# Patient Record
Sex: MALE | Race: WHITE | Employment: FULL TIME | URBAN - METROPOLITAN AREA
[De-identification: names, ages, dates, MRNs, and addresses within clinical notes are randomized per-mention and may not be internally consistent; named-entity substitution may affect disease eponyms.]

---

## 2017-03-20 ENCOUNTER — OFFICE VISIT (OUTPATIENT)
Dept: PULMONOLOGY | Age: 57
End: 2017-03-20

## 2017-03-20 VITALS
BODY MASS INDEX: 36.53 KG/M2 | WEIGHT: 241 LBS | HEART RATE: 67 BPM | HEIGHT: 68 IN | SYSTOLIC BLOOD PRESSURE: 128 MMHG | RESPIRATION RATE: 16 BRPM | DIASTOLIC BLOOD PRESSURE: 88 MMHG | OXYGEN SATURATION: 97 % | TEMPERATURE: 97.8 F

## 2017-03-20 DIAGNOSIS — Z71.89 CPAP USE COUNSELING: ICD-10-CM

## 2017-03-20 DIAGNOSIS — E66.9 OBESITY (BMI 30-39.9): ICD-10-CM

## 2017-03-20 DIAGNOSIS — Z99.89 OSA ON CPAP: Primary | ICD-10-CM

## 2017-03-20 DIAGNOSIS — G47.33 OSA ON CPAP: Primary | ICD-10-CM

## 2017-03-20 PROCEDURE — 99213 OFFICE O/P EST LOW 20 MIN: CPT | Performed by: NURSE PRACTITIONER

## 2017-03-20 ASSESSMENT — SLEEP AND FATIGUE QUESTIONNAIRES
HOW LIKELY ARE YOU TO NOD OFF OR FALL ASLEEP WHEN YOU ARE A PASSENGER IN A CAR FOR AN HOUR WITHOUT A BREAK: 0
HOW LIKELY ARE YOU TO NOD OFF OR FALL ASLEEP WHILE SITTING INACTIVE IN A PUBLIC PLACE: 0
HOW LIKELY ARE YOU TO NOD OFF OR FALL ASLEEP WHILE SITTING QUIETLY AFTER LUNCH WITHOUT ALCOHOL: 2
NECK CIRCUMFERENCE (INCHES): 17.25
HOW LIKELY ARE YOU TO NOD OFF OR FALL ASLEEP WHILE SITTING AND READING: 2
HOW LIKELY ARE YOU TO NOD OFF OR FALL ASLEEP WHILE SITTING AND TALKING TO SOMEONE: 0
ESS TOTAL SCORE: 8
HOW LIKELY ARE YOU TO NOD OFF OR FALL ASLEEP IN A CAR, WHILE STOPPED FOR A FEW MINUTES IN TRAFFIC: 0
HOW LIKELY ARE YOU TO NOD OFF OR FALL ASLEEP WHILE LYING DOWN TO REST IN THE AFTERNOON WHEN CIRCUMSTANCES PERMIT: 3
HOW LIKELY ARE YOU TO NOD OFF OR FALL ASLEEP WHILE WATCHING TV: 1

## 2017-04-10 DIAGNOSIS — B02.9 HERPES ZOSTER WITHOUT COMPLICATION: ICD-10-CM

## 2017-04-10 RX ORDER — GABAPENTIN 300 MG/1
300 CAPSULE ORAL EVERY EVENING
Qty: 30 CAPSULE | Refills: 3 | Status: SHIPPED | OUTPATIENT
Start: 2017-04-10 | End: 2017-09-10 | Stop reason: ALTCHOICE

## 2017-05-02 ENCOUNTER — OFFICE VISIT (OUTPATIENT)
Dept: CARDIOLOGY CLINIC | Age: 57
End: 2017-05-02

## 2017-05-02 VITALS
BODY MASS INDEX: 35.77 KG/M2 | WEIGHT: 236 LBS | HEART RATE: 52 BPM | OXYGEN SATURATION: 97 % | SYSTOLIC BLOOD PRESSURE: 124 MMHG | DIASTOLIC BLOOD PRESSURE: 76 MMHG | HEIGHT: 68 IN

## 2017-05-02 DIAGNOSIS — I25.10 CORONARY ARTERY DISEASE INVOLVING NATIVE CORONARY ARTERY OF NATIVE HEART WITHOUT ANGINA PECTORIS: Primary | ICD-10-CM

## 2017-05-02 DIAGNOSIS — E66.9 OBESITY (BMI 30-39.9): ICD-10-CM

## 2017-05-02 PROCEDURE — 99214 OFFICE O/P EST MOD 30 MIN: CPT | Performed by: INTERNAL MEDICINE

## 2017-05-08 RX ORDER — RIVAROXABAN 20 MG/1
TABLET, FILM COATED ORAL
Qty: 30 TABLET | Refills: 11 | Status: SHIPPED | OUTPATIENT
Start: 2017-05-08 | End: 2018-04-18 | Stop reason: SDUPTHER

## 2017-06-20 RX ORDER — AMLODIPINE BESYLATE 5 MG/1
TABLET ORAL
Qty: 90 TABLET | Refills: 3 | Status: SHIPPED | OUTPATIENT
Start: 2017-06-20 | End: 2018-04-18 | Stop reason: SDUPTHER

## 2017-06-20 RX ORDER — ATORVASTATIN CALCIUM 10 MG/1
TABLET, FILM COATED ORAL
Qty: 90 TABLET | Refills: 3 | Status: SHIPPED | OUTPATIENT
Start: 2017-06-20 | End: 2017-09-05

## 2017-09-05 RX ORDER — ATORVASTATIN CALCIUM 10 MG/1
TABLET, FILM COATED ORAL
Qty: 30 TABLET | Refills: 5 | Status: SHIPPED | OUTPATIENT
Start: 2017-09-05 | End: 2018-04-18 | Stop reason: SDUPTHER

## 2017-09-05 RX ORDER — ASPIRIN 81 MG/1
TABLET, CHEWABLE ORAL
Qty: 90 TABLET | Refills: 3 | Status: SHIPPED | OUTPATIENT
Start: 2017-09-05 | End: 2018-09-06 | Stop reason: SDUPTHER

## 2017-09-06 RX ORDER — CLOPIDOGREL BISULFATE 75 MG/1
TABLET ORAL
Qty: 30 TABLET | Refills: 11 | Status: SHIPPED | OUTPATIENT
Start: 2017-09-06 | End: 2018-04-18 | Stop reason: SDUPTHER

## 2017-09-19 ENCOUNTER — OFFICE VISIT (OUTPATIENT)
Dept: FAMILY MEDICINE CLINIC | Age: 57
End: 2017-09-19

## 2017-09-19 VITALS
WEIGHT: 233 LBS | DIASTOLIC BLOOD PRESSURE: 74 MMHG | HEIGHT: 68 IN | HEART RATE: 68 BPM | SYSTOLIC BLOOD PRESSURE: 126 MMHG | OXYGEN SATURATION: 98 % | BODY MASS INDEX: 35.31 KG/M2

## 2017-09-19 DIAGNOSIS — N20.0 RENAL CALCULUS: ICD-10-CM

## 2017-09-19 DIAGNOSIS — M25.552 LEFT HIP PAIN: Primary | ICD-10-CM

## 2017-09-19 DIAGNOSIS — K57.32 DIVERTICULITIS OF LARGE INTESTINE WITHOUT PERFORATION OR ABSCESS WITHOUT BLEEDING: ICD-10-CM

## 2017-09-19 PROCEDURE — 90471 IMMUNIZATION ADMIN: CPT | Performed by: FAMILY MEDICINE

## 2017-09-19 PROCEDURE — 90630 INFLUENZA, QUADV, 18-64 YRS, ID, PF, MICRO INJ, 0.1ML (FLUZONE QUADV, PF): CPT | Performed by: FAMILY MEDICINE

## 2017-09-19 PROCEDURE — 99214 OFFICE O/P EST MOD 30 MIN: CPT | Performed by: FAMILY MEDICINE

## 2017-10-05 ENCOUNTER — HOSPITAL ENCOUNTER (OUTPATIENT)
Dept: OTHER | Age: 57
Discharge: HOME | End: 2017-10-05
Attending: UROLOGY | Admitting: UROLOGY

## 2017-10-05 ENCOUNTER — HOSPITAL ENCOUNTER (OUTPATIENT)
Dept: GENERAL RADIOLOGY | Age: 57
Discharge: HOME | End: 2017-10-05

## 2017-10-05 DIAGNOSIS — N20.1 CALCULUS OF URETER: ICD-10-CM

## 2017-10-09 ENCOUNTER — OFFICE VISIT (OUTPATIENT)
Dept: ORTHOPEDIC SURGERY | Age: 57
End: 2017-10-09

## 2017-10-09 VITALS — BODY MASS INDEX: 34.92 KG/M2 | WEIGHT: 230.4 LBS | HEIGHT: 68 IN

## 2017-10-09 DIAGNOSIS — M16.12 PRIMARY OSTEOARTHRITIS OF LEFT HIP: ICD-10-CM

## 2017-10-09 DIAGNOSIS — M25.552 LEFT HIP PAIN: Primary | ICD-10-CM

## 2017-10-09 PROCEDURE — 99203 OFFICE O/P NEW LOW 30 MIN: CPT | Performed by: ORTHOPAEDIC SURGERY

## 2017-10-09 PROCEDURE — 73502 X-RAY EXAM HIP UNI 2-3 VIEWS: CPT | Performed by: ORTHOPAEDIC SURGERY

## 2017-10-09 NOTE — LETTER
[address] [place] [address] Phone: [phone]     Fax: [phone]      Surgeon:  Shira Porter M.D.    Office: [phone]) or Direct [phone]    Surgery Date: 2017  Time: 11:00 am   Time Needed: 15 minutes    Procedure: Arthrogram and Cortisone Injection Left Hip    Diagnosis: Osteoarthritis Left Hip       Patient Name: Haroon Cope    : 1960  SEX: Male Social Security Number:     Address: [address] [place] [address] (home) [phone] (work)    PCP: Carlita Medley MD    H&P: PCP    Anesthesia:    General       Local      MAC      SCOTT      SPINAL       Primary Insurance:  Payor: [employer] / [facility]: [facility] / Product Type: *No Product type* /         Allergies: No Known Allergies     Special equipment:     Tanya Root 10/9/2017     AKB:[phone]

## 2017-10-09 NOTE — PROGRESS NOTES
CHIEF COMPLAINT:    Chief Complaint   Patient presents with    Hip Pain     LEFT HIP- 2 YEARS, GROIN PAIN, SOME POSTERIOR HIP PAIN. HISTORY OF PRESENT ILLNESS:                The patient is a 56 y.o. male who presents to clinic for evaluation of LEFT hip pain. He states this pain began about 2 years ago without injury. He points to the groin as the primary location of his pain. He states he is very limited with activity secondary to this hip pain. He denies any radiating pain or numbness and tingling distally.     Past Medical History:   Diagnosis Date    [facility]     Coronary artery disease 4/23/2014    Dr. Navi Wilson hematology and Dr. Reinaldo Harris Cardiology    Hyperlipidemia     DEE on CPAP           The pain assessment was noted & is as follows:  Pain Assessment  Location of Pain: Pelvis (HIP)  Location Modifiers: Left  Severity of Pain: 3 (8/10 AT WORST)  Quality of Pain: Aching, Dull, Sharp  Duration of Pain: Persistent  Frequency of Pain: Intermittent  Aggravating Factors: Walking, Standing, Stairs, Exercise, Bending  Limiting Behavior: Yes  Relieving Factors: Rest]      Work Status/Functionality:     Past Medical History: Medical history form was reviewed today & can be found in the media tab  Past Medical History:   Diagnosis Date    [facility] ([facility]     Coronary artery disease 4/23/2014    Dr. Navi Wilson hematology and Dr. Reinaldo Harris Cardiology    Hyperlipidemia     DEE on CPAP       Past Surgical History:     Past Surgical History:   Procedure Laterality Date    CARDIAC CATHETERIZATION  2014    COLONOSCOPY      COLONOSCOPY  5/2/16    colon polyp, biopsy ascending lesion    DENTAL SURGERY  2015    FRACTURE SURGERY Right 2004    tib-fib    SKIN BIOPSY       Current Medications:     Current Outpatient Prescriptions:     ondansetron (ZOFRAN) 4 MG tablet, Take 1 tablet by mouth every 8 hours as needed for Nausea or Vomiting, Disp: 10 tablet, Rfl: 0    clopidogrel it was checked for errors. It is possible that there are still dictated errors within this office note. If so, please bring any errors to my attention for an addendum. All efforts were made to ensure that this office note is accurate.           Jeffrey Turk MD

## 2017-10-17 ENCOUNTER — TELEPHONE (OUTPATIENT)
Dept: ORTHOPEDIC SURGERY | Age: 57
End: 2017-10-17

## 2017-10-26 ENCOUNTER — OFFICE VISIT (OUTPATIENT)
Dept: FAMILY MEDICINE CLINIC | Age: 57
End: 2017-10-26

## 2017-10-26 VITALS
WEIGHT: 230 LBS | DIASTOLIC BLOOD PRESSURE: 78 MMHG | SYSTOLIC BLOOD PRESSURE: 112 MMHG | HEART RATE: 56 BPM | BODY MASS INDEX: 34.98 KG/M2 | TEMPERATURE: 98.1 F

## 2017-10-26 DIAGNOSIS — Z01.818 PREOPERATIVE EXAMINATION: Primary | ICD-10-CM

## 2017-10-26 PROCEDURE — 99243 OFF/OP CNSLTJ NEW/EST LOW 30: CPT | Performed by: FAMILY MEDICINE

## 2017-10-26 NOTE — PROGRESS NOTES
[facility]  [phone]  Fax: [phone]   Pre-operative History and Physical      DIAGNOSIS:  Left hip arthritis    PROCEDURE:  Left hip corticosteroid injection under fluoroscopy      History Obtained From:  patient    HISTORY OF PRESENT ILLNESS:    The patient is a 56 y.o. male with significant past medical history of hip pain who presents preop exam       Past Medical History:   Diagnosis Date    Blood clotting disorder ([address]     Coronary artery disease 4/23/2014    Dr. Alex Diaz hematology and Dr. Veto Keane Cardiology    Hyperlipidemia     DEE on CPAP     Primary osteoarthritis of left hip 10/9/2017     Past Surgical History:   Procedure Laterality Date    CARDIAC CATHETERIZATION  2014    COLONOSCOPY      COLONOSCOPY  5/2/16    colon polyp, biopsy ascending lesion    DENTAL SURGERY  2015    FRACTURE SURGERY Right 2004    tib-fib    SKIN BIOPSY       Current Outpatient Prescriptions   Medication Sig Dispense Refill    clopidogrel (PLAVIX) 75 MG tablet TAKE 1 TABLET BY MOUTH EVERY DAY 30 tablet 11    aspirin 81 MG chewable tablet CHEW 1 TABLET DAILY 90 tablet 3    atorvastatin (LIPITOR) 10 MG tablet TAKE 1 TABLET BY MOUTH EVERY NIGHT AT BEDTIME 30 tablet 5    VIRT-JAVI FORTE 2.5-25-2 MG TABS TAKE 1 TABLET BY MOUTH DAILY 30 tablet 0    amLODIPine (NORVASC) 5 MG tablet TAKE 1 TABLET DAILY 90 tablet 3    XARELTO 20 MG TABS tablet TAKE 1 TABLET BY MOUTH EVERY DAY 30 tablet 11    nitroGLYCERIN (NITROSTAT) 0.4 MG SL tablet Place 1 tablet under the tongue every 5 minutes as needed for Chest pain. 25 tablet 11     No current facility-administered medications for this visit. Allergies:  Review of patient's allergies indicates no known allergies.   History of allergic reaction to anesthesia:  No     History   Smoking Status    Current Every Day Smoker    Packs/day: 0.50    Years: 30.00    Start date: 1/1/1980   Smokeless Tobacco    Never Used     The patient states he drinks zero per

## 2017-11-07 ENCOUNTER — HOSPITAL ENCOUNTER (OUTPATIENT)
Dept: SURGERY | Age: 57
Discharge: HOME | End: 2017-11-07
Attending: ORTHOPAEDIC SURGERY | Admitting: ORTHOPAEDIC SURGERY

## 2017-11-07 VITALS
BODY MASS INDEX: 34.86 KG/M2 | WEIGHT: 230 LBS | RESPIRATION RATE: 16 BRPM | DIASTOLIC BLOOD PRESSURE: 74 MMHG | HEART RATE: 53 BPM | HEIGHT: 68 IN | TEMPERATURE: 97.2 F | SYSTOLIC BLOOD PRESSURE: 117 MMHG | OXYGEN SATURATION: 96 %

## 2017-11-07 DIAGNOSIS — M25.552 PAIN OF LEFT HIP JOINT: ICD-10-CM

## 2017-11-07 RX ORDER — SODIUM CHLORIDE 0.9 % (FLUSH) 0.9 %
10 SYRINGE (ML) INJECTION EVERY 12 HOURS SCHEDULED
Status: DISCONTINUED | OUTPATIENT
Start: 2017-11-07 | End: 2017-11-08 | Stop reason: HOSPADM

## 2017-11-07 RX ORDER — LABETALOL HYDROCHLORIDE 5 MG/ML
5 INJECTION, SOLUTION INTRAVENOUS EVERY 10 MIN PRN
Status: DISCONTINUED | OUTPATIENT
Start: 2017-11-07 | End: 2017-11-08 | Stop reason: HOSPADM

## 2017-11-07 RX ORDER — MORPHINE SULFATE 2 MG/ML
2 INJECTION, SOLUTION INTRAMUSCULAR; INTRAVENOUS EVERY 5 MIN PRN
Status: DISCONTINUED | OUTPATIENT
Start: 2017-11-07 | End: 2017-11-08 | Stop reason: HOSPADM

## 2017-11-07 RX ORDER — ONDANSETRON 2 MG/ML
4 INJECTION INTRAMUSCULAR; INTRAVENOUS
Status: ACTIVE | OUTPATIENT
Start: 2017-11-07 | End: 2017-11-07

## 2017-11-07 RX ORDER — DIPHENHYDRAMINE HYDROCHLORIDE 50 MG/ML
12.5 INJECTION INTRAMUSCULAR; INTRAVENOUS
Status: ACTIVE | OUTPATIENT
Start: 2017-11-07 | End: 2017-11-07

## 2017-11-07 RX ORDER — OXYCODONE HYDROCHLORIDE AND ACETAMINOPHEN 5; 325 MG/1; MG/1
2 TABLET ORAL PRN
Status: ACTIVE | OUTPATIENT
Start: 2017-11-07 | End: 2017-11-07

## 2017-11-07 RX ORDER — PROMETHAZINE HYDROCHLORIDE 25 MG/ML
6.25 INJECTION, SOLUTION INTRAMUSCULAR; INTRAVENOUS
Status: ACTIVE | OUTPATIENT
Start: 2017-11-07 | End: 2017-11-07

## 2017-11-07 RX ORDER — HYDRALAZINE HYDROCHLORIDE 20 MG/ML
5 INJECTION INTRAMUSCULAR; INTRAVENOUS EVERY 10 MIN PRN
Status: DISCONTINUED | OUTPATIENT
Start: 2017-11-07 | End: 2017-11-08 | Stop reason: HOSPADM

## 2017-11-07 RX ORDER — SODIUM CHLORIDE, SODIUM LACTATE, POTASSIUM CHLORIDE, CALCIUM CHLORIDE 600; 310; 30; 20 MG/100ML; MG/100ML; MG/100ML; MG/100ML
INJECTION, SOLUTION INTRAVENOUS CONTINUOUS
Status: DISCONTINUED | OUTPATIENT
Start: 2017-11-07 | End: 2017-11-08 | Stop reason: HOSPADM

## 2017-11-07 RX ORDER — MORPHINE SULFATE 2 MG/ML
1 INJECTION, SOLUTION INTRAMUSCULAR; INTRAVENOUS EVERY 5 MIN PRN
Status: DISCONTINUED | OUTPATIENT
Start: 2017-11-07 | End: 2017-11-08 | Stop reason: HOSPADM

## 2017-11-07 RX ORDER — MEPERIDINE HYDROCHLORIDE 50 MG/ML
12.5 INJECTION INTRAMUSCULAR; INTRAVENOUS; SUBCUTANEOUS EVERY 5 MIN PRN
Status: DISCONTINUED | OUTPATIENT
Start: 2017-11-07 | End: 2017-11-08 | Stop reason: HOSPADM

## 2017-11-07 RX ORDER — OXYCODONE HYDROCHLORIDE AND ACETAMINOPHEN 5; 325 MG/1; MG/1
1 TABLET ORAL PRN
Status: ACTIVE | OUTPATIENT
Start: 2017-11-07 | End: 2017-11-07

## 2017-11-07 RX ORDER — LIDOCAINE HYDROCHLORIDE 10 MG/ML
1 INJECTION, SOLUTION EPIDURAL; INFILTRATION; INTRACAUDAL; PERINEURAL
Status: ACTIVE | OUTPATIENT
Start: 2017-11-07 | End: 2017-11-07

## 2017-11-07 RX ORDER — SODIUM CHLORIDE 0.9 % (FLUSH) 0.9 %
10 SYRINGE (ML) INJECTION PRN
Status: DISCONTINUED | OUTPATIENT
Start: 2017-11-07 | End: 2017-11-08 | Stop reason: HOSPADM

## 2017-11-07 RX ADMIN — SODIUM CHLORIDE, SODIUM LACTATE, POTASSIUM CHLORIDE, CALCIUM CHLORIDE: 600; 310; 30; 20 INJECTION, SOLUTION INTRAVENOUS at 12:40

## 2017-11-07 ASSESSMENT — PAIN - FUNCTIONAL ASSESSMENT: PAIN_FUNCTIONAL_ASSESSMENT: 0-10

## 2017-11-07 NOTE — ANESTHESIA PRE-OP
Continuous Uday Bain MD        sodium chloride flush 0.9 % injection 10 mL  10 mL Intravenous 2 times per day Uday Bain MD        sodium chloride flush 0.9 % injection 10 mL  10 mL Intravenous PRN Uday Bain MD        lidocaine PF 1 % injection 1 mL  1 mL Intradermal Once PRN Uday Bain MD        HYDROmorphone (DILAUDID) injection 0.25 mg  0.25 mg Intravenous Q5 Min PRN Lashawn Mcfarland MD        HYDROmorphone (DILAUDID) injection 0.5 mg  0.5 mg Intravenous Q5 Min CHIN Lashawn Mcfarland MD        morphine (PF) injection 1 mg  1 mg Intravenous Q5 Min PAM Mcfarland MD        morphine (PF) injection 2 mg  2 mg Intravenous Q5 Min PRCARMEN Mcfarland MD        oxyCODONE-acetaminophen (PERCOCET) 5-325 MG per tablet 1 tablet  1 tablet Oral PRN Lashawn Mcfarland MD        Or    oxyCODONE-acetaminophen (PERCOCET) 5-325 MG per tablet 2 tablet  2 tablet Oral PRCARMEN Mcfarland MD        diphenhydrAMINE (BENADRYL) injection 12.5 mg  12.5 mg Intravenous Once PRCARMEN Mcfarland MD        ondansetron [facility]) injection 4 mg  4 mg Intravenous Once PRCARMEN Mcfarland MD        promethazine [facility]) injection 6.25 mg  6.25 mg Intravenous Once PRN Lashawn Mcfarland MD        labetalol (NORMODYNE;TRANDATE) injection 5 mg  5 mg Intravenous Q10 Min PRCARMEN Mcfarland MD        hydrALAZINE (APRESOLINE) injection 5 mg  5 mg Intravenous Q10 Min PRN Lashawn Mcfarland MD        meperidine (DEMEROL) injection 12.5 mg  12.5 mg Intravenous Q5 Min PRCARMEN Mcfarland MD           Allergies:  No Known Allergies    Problem List:    Patient Active Problem List   Diagnosis Code    Coronary artery disease I25.10    History of MTHFR mutation Z86.39    Antiphospholipid antibody positive R76.0    DEE on CPAP G47.33, Z99.89    Obesity (BMI 30-39. 9) E66.9    Primary osteoarthritis of left hip M16.12       Past Medical History:        Diagnosis Date  Antiphospholipid syndrome (HCC)     Blood clotting disorder (HCC)     Coronary artery disease 4/23/2014    Dr. Jose Haddad hematology and Dr. Vanessa Solre Cardiology    Hyperlipidemia     DEE on CPAP     Primary osteoarthritis of left hip 10/9/2017       Past Surgical History:        Procedure Laterality Date    CARDIAC CATHETERIZATION  2014    COLONOSCOPY      COLONOSCOPY  5/2/16    colon polyp, biopsy ascending lesion    DENTAL SURGERY  2015    FRACTURE SURGERY Right 2004    tib-fib    SKIN BIOPSY         Social History:    Social History   Substance Use Topics    Smoking status: Current Every Day Smoker     Packs/day: 0.50     Years: 30.00     Start date: 1/1/1980    Smokeless tobacco: Never Used    Alcohol use No                                Ready to quit: No  Counseling given: Yes      Vital Signs (Current):   Vitals:    11/07/17 1237   BP: 126/74   Pulse: 51   Resp: 16   Temp: 98.3 °F (36.8 °C)   TempSrc: Temporal   SpO2: 98%   Weight: 230 lb (104.3 kg)   Height: 5' 8\" (1.727 m)                                              BP Readings from Last 3 Encounters:   11/07/17 126/74   10/26/17 112/78   09/19/17 126/74       NPO Status:                                                                                 BMI:   Wt Readings from Last 3 Encounters:   11/07/17 230 lb (104.3 kg)   11/03/17 230 lb (104.3 kg)   10/26/17 230 lb (104.3 kg)     Body mass index is 34.97 kg/m².     Anesthesia Evaluation  Patient summary reviewed and Nursing notes reviewed no history of anesthetic complications:   Airway: Mallampati: II     Neck ROM: full  Mouth opening: > = 3 FB Dental: normal exam         Pulmonary:negative ROS and normal exam    (+) sleep apnea: on CPAP,                             Cardiovascular:negative ROS                   ROS comment: Anti phospolipid disorder, managed with plavix and xarelto     Neuro/Psych:   {neg ROS              GI/Hepatic/Renal: neg ROS       (-) hiatal hernia and GERD

## 2017-11-07 NOTE — PROGRESS NOTES
Kady Lamar at bedside - notified of anticoagulant  regime - plavix last taken yesterday 12  Ok- to waive 12 lead ekg - pt denies chest pain/ SOB/dizziness

## 2017-11-07 NOTE — ANESTHESIA POST-OP
Postoperative Anesthesia Note    Name:    Maye Jones  MRN:      [id number]    Patient Vitals for the past 12 hrs:   BP Temp Temp src Pulse Resp SpO2 Height Weight   11/07/17 1344 117/74 97.2 °F (36.2 °C) Temporal 53 16 96 % - -   11/07/17 1329 123/79 - - 51 16 95 % - -   11/07/17 1324 123/70 - - 56 16 95 % - -   11/07/17 1319 117/69 97.1 °F (36.2 °C) Temporal 60 16 94 % - -   11/07/17 1237 126/74 98.3 °F (36.8 °C) Temporal 51 16 98 % 5' 8\" (1.727 m) 230 lb (104.3 kg)        LABS:    CBC  Lab Results   Component Value Date/Time    WBC 10.5 09/10/2017 06:15 PM    HGB 14.0 09/10/2017 06:15 PM    HCT 39.2 (L) 09/10/2017 06:15 PM     09/10/2017 06:15 PM     RENAL  Lab Results   Component Value Date/Time     09/10/2017 06:15 PM    K 3.6 09/10/2017 06:15 PM    CL 96 (L) 09/10/2017 06:15 PM    CO2 26 09/10/2017 06:15 PM    BUN 9 09/10/2017 06:15 PM    CREATININE 0.7 (L) 09/10/2017 06:15 PM    GLUCOSE 94 09/10/2017 06:15 PM    GLUCOSE 103 04/02/2015 09:19 AM     COAGS  Lab Results   Component Value Date/Time    PROTIME 11.4 04/06/2014 06:03 PM    INR 1.02 04/06/2014 06:03 PM    APTT 30.4 04/06/2014 06:03 PM       Intake & Output: In: 400 [I.V.:400]  Out: -     Nausea & Vomiting:  No    Level of Consciousness:  Awake    Pain Assessment:  Adequate analgesia    Anesthesia Complications:  No apparent anesthetic complications    SUMMARY      Vital signs stable  OK to discharge from Stage I post anesthesia care.   Care transferred from Anesthesiology department on discharge from perioperative area

## 2017-11-08 NOTE — OP NOTE
hip joint without difficulty. The  needle was removed and a Band-Aid applied. The hip was taken through  range of motion, and the patient was then taken to recovery room  postprocedure, having tolerated the procedure well.         Tra Appiah MD    D: 11/07/2017 13:54:35       T: 11/07/2017 17:12:02     [email or web address]  Job#: [id number]     Doc#: [id number]

## 2017-11-20 ENCOUNTER — OFFICE VISIT (OUTPATIENT)
Dept: ORTHOPEDIC SURGERY | Age: 57
End: 2017-11-20

## 2017-11-20 VITALS
HEIGHT: 68 IN | HEART RATE: 74 BPM | BODY MASS INDEX: 34.85 KG/M2 | SYSTOLIC BLOOD PRESSURE: 135 MMHG | WEIGHT: 229.94 LBS | DIASTOLIC BLOOD PRESSURE: 85 MMHG

## 2017-11-20 DIAGNOSIS — M16.12 ARTHRITIS OF LEFT HIP: Primary | ICD-10-CM

## 2017-11-20 PROCEDURE — 99212 OFFICE O/P EST SF 10 MIN: CPT | Performed by: ORTHOPAEDIC SURGERY

## 2017-11-20 NOTE — PROGRESS NOTES
The patient returns after his LEFT hip intra-articular injection of 11/7/17. He has known severe osteoarthritis of the LEFT hip. He reports that he had dramatic decrease in his symptoms for about a week. Now, he is greatly improved versus preinjection but he is noticing that his discomfort is slightly present. His gait is almost normal.  He does have markedly diminished range of motion of the hip with basically no internal rotation 5° of external rotation. He can hip flex to 100° without pain. Negative straight leg raise. Negative logroll examination today after the shot. I discussed with the patient the shot clearly take his pain away so there is no other component of tendinitis of arthritis that is causing his symptoms. He wants to stay away from dura placement surgery if at all possible. We also be seen back again p.r.n. He knows that the shot cannot be repeated for at least 3 months. I have personally performed and/or participated in the history, exam and medical decision making and agree with all pertinent clinical information. I have also reviewed and agree with the past medical, family and social history unless otherwise noted. This dictation was performed with a verbal recognition program (DRAGON) and it was checked for errors. It is possible that there are still dictated errors within this office note. If so, please bring any errors to my attention for an addendum. All efforts were made to ensure that this office note is accurate.           Jeffrey Turk MD

## 2018-03-23 ENCOUNTER — OFFICE VISIT (OUTPATIENT)
Dept: PULMONOLOGY | Age: 58
End: 2018-03-23

## 2018-03-23 VITALS
SYSTOLIC BLOOD PRESSURE: 145 MMHG | RESPIRATION RATE: 16 BRPM | WEIGHT: 237 LBS | HEART RATE: 55 BPM | HEIGHT: 67 IN | DIASTOLIC BLOOD PRESSURE: 87 MMHG | TEMPERATURE: 98.1 F | OXYGEN SATURATION: 96 % | BODY MASS INDEX: 37.2 KG/M2

## 2018-03-23 DIAGNOSIS — Z99.89 OSA ON CPAP: Primary | ICD-10-CM

## 2018-03-23 DIAGNOSIS — Z72.821 POOR SLEEP HYGIENE: ICD-10-CM

## 2018-03-23 DIAGNOSIS — G47.33 OSA ON CPAP: Primary | ICD-10-CM

## 2018-03-23 DIAGNOSIS — E66.9 OBESITY (BMI 30-39.9): ICD-10-CM

## 2018-03-23 DIAGNOSIS — Z71.89 CPAP USE COUNSELING: ICD-10-CM

## 2018-03-23 PROCEDURE — 99213 OFFICE O/P EST LOW 20 MIN: CPT | Performed by: NURSE PRACTITIONER

## 2018-03-23 ASSESSMENT — SLEEP AND FATIGUE QUESTIONNAIRES
ESS TOTAL SCORE: 7
NECK CIRCUMFERENCE (INCHES): 17.25
HOW LIKELY ARE YOU TO NOD OFF OR FALL ASLEEP WHILE WATCHING TV: 1
HOW LIKELY ARE YOU TO NOD OFF OR FALL ASLEEP WHILE LYING DOWN TO REST IN THE AFTERNOON WHEN CIRCUMSTANCES PERMIT: 3
HOW LIKELY ARE YOU TO NOD OFF OR FALL ASLEEP WHILE SITTING QUIETLY AFTER LUNCH WITHOUT ALCOHOL: 1
HOW LIKELY ARE YOU TO NOD OFF OR FALL ASLEEP WHILE SITTING AND READING: 1
HOW LIKELY ARE YOU TO NOD OFF OR FALL ASLEEP IN A CAR, WHILE STOPPED FOR A FEW MINUTES IN TRAFFIC: 0
HOW LIKELY ARE YOU TO NOD OFF OR FALL ASLEEP WHILE SITTING AND TALKING TO SOMEONE: 0
HOW LIKELY ARE YOU TO NOD OFF OR FALL ASLEEP WHILE SITTING INACTIVE IN A PUBLIC PLACE: 1
HOW LIKELY ARE YOU TO NOD OFF OR FALL ASLEEP WHEN YOU ARE A PASSENGER IN A CAR FOR AN HOUR WITHOUT A BREAK: 0

## 2018-03-23 NOTE — PATIENT INSTRUCTIONS
might gain a few pounds. But quitting smoking will have a much more positive effect on your health than weighing a few pounds more. Plus, you can help prevent some weight gain by being more active and eating less. Taking the medicine bupropion might help control weight gain. What else can I do to improve my chances of quitting?  You can:  ?Start exercising. ?Stay away from smokers and places that you associate with smoking. If people close to you smoke, ask them to quit with you. ? Keep gum, hard candy, or something to put in your mouth handy. If you get a craving for a cigarette, try one of these instead. ?Dont give up, even if you start smoking again. It takes most people a few tries before they succeed. You can also get help from a free phone line (6-342-QUIT-NOW) or go online to www.smokefree.gov. Your pulmonary team is here to help you quit. Call for assistance [phone]    Sleep Hygiene. .. Tips for better sleep. .. Avoid naps. This will ensure you are sleepy at bedtime. If you have to take a nap, sleep less than 1 hour, before 3 pm.  Sleep only when sleepy; this reduces the time you are awake in bed. Regular exercise is recommended to help you deepen your sleep, but not within 4-6 hours of your bedtime. Timing of exercise is important, aim to exercise early in the morning or early afternoon. A light snack may help you fall asleep. Warm milk and foods high in the amino acid tryptophan, such as bananas, may help you to sleep  Be sure to avoid heavy, spicy or sugary foods 4-6 hours before bedtime and avoid at snack time. Stay away from stimulants such as caffeine and nicotine for at least 4-6 hours before bed. Stimulants can interfere with your ability to fall asleep. Caffeine is found in tea, cola, coffee, cocoa and chocolate and is best avoided at bedtime. Nicotine is found in tobacco products. Avoid alcohol 4-6 hours before bedtime.  Alcohol has an immediate sleep-inducing effect, after a few hours when alcohol levels fall there is a stimulant or wake-up effect and will cause fragmented sleep. Sleep rituals are important. Give your body clues it is time to slow down and sleep. Examples include; yoga, deep breathing, listen to relaxing music, a hot bath or a few minutes of reading. Have a fixed bedtime and awakening time, Even on weekends! You will feel better keeping a regular sleep cycle, even if you are retired or not working. Get into your favorite sleep position. If not asleep in 30 minutes, get up and do something boring until you feel sleepy. Remember not to expose yourself to bright lights such as TV, phone or tablet screens. Only use your bed for sleeping. Do not use your bed as an office, workroom or recreation room. Use comfortable bedding. Uncomfortable bedding can prevent good sleep. Ensure your bedroom is quiet and comfortable. A cooler room along with enough blankets to stay warm is recommended. If your room is too noisy, try a white noise machine. If too bright, try black out shades or an eye mask. Dont take worries to bed. Leave worries about work, school etc. behind you when you go to bed. Some people find it helpful to assign a worry period in the evening or late afternoon to write down your worries and get them out of your system.      CPAP Equipment Cleaning and Disinfecting Schedule  Equipment Cleaning Frequency Instructions  Disinfecting Frequency   Non-Disposable Filters  Weekly Mild soapy water, Rinse, Air Dry Not Required   Disposable Filters Change as needed  2-4 weeks Do Not Wash Not Required   Hose/tubing Daily Mild soapy water, Rinse, Air Dry Once a week   Mask / Nasal Pillows Daily Mild soapy water, Rinse, Air Dry Once a week   Headgear Weekly Hand wash, Mild soapy water, Rinse, Dry  Not Required   Humidifier Daily Empty water daily  Mild soapy water, Rinse well, Air Dry  Once a week   CPAP Unit As Needed Dust with damp cloth,  No detergents or sprays Not Required         Disinfect (per schedule) with 1 part white vinegar and 3 parts water- soak mask and water chamber for 30 minutes every 1-2 weeks, more often if sick. Allow water/vinegar mixture to run through tubing. Allow all equipment to air dry. Drying Hints:   Always hang tubing away from direct sunlight, as this will cause the tubing to become yellow, brittle and crack over a period of time. DO NOT attach the wet tubing to your CPAP unit to blow-dry it. The moisture from the tubing can drain back into your machine. Moisture in your unit can cause sudden pressure increases or short circuits  DO's and DON'Ts:  - Don't use alcohol-based products to clean your mask, because it can cause the materials to become hard and brittle. - Don't put headgear in the washer or dryer  - Don't use any caustic or household cleaning solutions such as bleach on your CPAP   equipment.  - Do follow the recommended cleaning schedule. - Do change your disposable filter frequently. Adapted From: [employer].com/cleaning. shtm.   These are general suggestions for all models please follow specific s recommendations and specific instructions

## 2018-03-23 NOTE — PROGRESS NOTES
CHIEF COMPLAINT: Sleep apnea    Adán Phipps is a 56 y.o. male in office for DEE follow up. At last visit compliance was suboptimal.  He is using CPAP nightly. STates CPAP has made a difference in how he feels, no longer nodding off when driving, less fatigue in day. Patient is using CPAP 4 hrs/night. Using humidifier. No snoring on CPAP. The pressure is well tolerated. The mask is comfortable- full face. Sometimes mask leak. No significant daytime sleepiness. No nodding off when driving. No dry nose or throat. No fatigue. Bedtime is 10 pm ( still going to bed in kids room then goes to his bed around 2am) and rise time is 7-730 am. Sleep onset is usually 15 minutes. Wakes up 2-3 times at night total- hip pain. 0-1 nocturia. It takes usually few minutes to fall back a sleep. Rare naps during the day. No headache in am. No weight gain. 4-5 caffienated beverages during the day. No alcohol. ESS is 7.     Past Medical History:   Diagnosis Date    [facility]     Blood clotting disorder (HCC)     Coronary artery disease 4/23/2014    Dr. Xochilt Chino hematology and Dr. Bassett Never Cardiology    Hyperlipidemia     DEE on CPAP     Primary osteoarthritis of left hip 10/9/2017     Past Surgical History:   Procedure Laterality Date    CARDIAC CATHETERIZATION  2014    COLONOSCOPY      COLONOSCOPY  5/2/16    colon polyp, biopsy ascending lesion    DENTAL SURGERY  2015    FRACTURE SURGERY Right 2004    tib-fib    SKIN BIOPSY       No Known Allergies     Current Medications:    Current Outpatient Prescriptions:     clopidogrel (PLAVIX) 75 MG tablet, TAKE 1 TABLET BY MOUTH EVERY DAY, Disp: 30 tablet, Rfl: 11    aspirin 81 MG chewable tablet, CHEW 1 TABLET DAILY, Disp: 90 tablet, Rfl: 3    atorvastatin (LIPITOR) 10 MG tablet, TAKE 1 TABLET BY MOUTH EVERY NIGHT AT BEDTIME, Disp: 30 tablet, Rfl: 5    VIRT-JAVI FORTE 2.5-25-2 MG TABS, TAKE 1 TABLET BY MOUTH DAILY, Disp: 30 tablet, Rfl: 0    amLODIPine

## 2018-04-18 RX ORDER — CLOPIDOGREL BISULFATE 75 MG/1
TABLET ORAL
Qty: 90 TABLET | Refills: 0 | Status: SHIPPED | OUTPATIENT
Start: 2018-04-18 | End: 2018-09-06 | Stop reason: ALTCHOICE

## 2018-04-18 RX ORDER — ATORVASTATIN CALCIUM 10 MG/1
TABLET, FILM COATED ORAL
Qty: 90 TABLET | Refills: 0 | Status: SHIPPED | OUTPATIENT
Start: 2018-04-18 | End: 2018-09-12 | Stop reason: SINTOL

## 2018-04-19 RX ORDER — AMLODIPINE BESYLATE 5 MG/1
TABLET ORAL
Qty: 90 TABLET | Refills: 0 | Status: SHIPPED | OUTPATIENT
Start: 2018-04-19 | End: 2018-09-06 | Stop reason: SDUPTHER

## 2018-05-01 ENCOUNTER — OFFICE VISIT (OUTPATIENT)
Dept: CARDIOLOGY CLINIC | Age: 58
End: 2018-05-01

## 2018-05-01 VITALS
HEIGHT: 67 IN | DIASTOLIC BLOOD PRESSURE: 72 MMHG | BODY MASS INDEX: 37.89 KG/M2 | HEART RATE: 75 BPM | WEIGHT: 241.4 LBS | OXYGEN SATURATION: 96 % | SYSTOLIC BLOOD PRESSURE: 122 MMHG

## 2018-05-01 DIAGNOSIS — R76.0 ANTIPHOSPHOLIPID ANTIBODY POSITIVE: ICD-10-CM

## 2018-05-01 DIAGNOSIS — E66.9 OBESITY (BMI 30-39.9): ICD-10-CM

## 2018-05-01 DIAGNOSIS — Z15.89 HISTORY OF MTHFR MUTATION: ICD-10-CM

## 2018-05-01 DIAGNOSIS — I25.10 CORONARY ARTERY DISEASE INVOLVING NATIVE CORONARY ARTERY OF NATIVE HEART WITHOUT ANGINA PECTORIS: Primary | ICD-10-CM

## 2018-05-01 PROCEDURE — 99214 OFFICE O/P EST MOD 30 MIN: CPT | Performed by: INTERNAL MEDICINE

## 2018-06-04 RX ORDER — RIVAROXABAN 20 MG/1
TABLET, FILM COATED ORAL
Qty: 90 TABLET | Refills: 3 | Status: SHIPPED | OUTPATIENT
Start: 2018-06-04 | End: 2018-09-06 | Stop reason: SDUPTHER

## 2018-08-13 ENCOUNTER — TELEPHONE (OUTPATIENT)
Dept: CARDIOLOGY CLINIC | Age: 58
End: 2018-08-13

## 2018-08-13 NOTE — LETTER
[address] [place] [address] - [address] [place] [id number]  Phone: [phone]  Fax: [phone]            August 13, 2018     [address] [id number]  1960    Sophia Narvaez MD    To whom it may concern,          Chris Garcia is Erby Dessert for surgery. Do not stop aspirin. Ok to hold Xarelto x 1-2 days pre-op. Resume immediately post-op. If you have any questions or concerns, please don't hesitate to call.     Sincerely,        Sophia Narvaez MD

## 2018-08-13 NOTE — TELEPHONE ENCOUNTER
[id number] Chasity Lee for surgery  Do not stop ASA  OK hold Xarelto x 1-2 days pre-op.  Resume immediately post-op

## 2018-08-31 RX ORDER — AMLODIPINE BESYLATE 5 MG/1
TABLET ORAL
Qty: 90 TABLET | Refills: 3 | Status: SHIPPED | OUTPATIENT
Start: 2018-08-31 | End: 2019-08-15 | Stop reason: SDUPTHER

## 2018-08-31 RX ORDER — ASPIRIN 81 MG/1
TABLET, CHEWABLE ORAL
Qty: 90 TABLET | Refills: 3 | Status: SHIPPED | OUTPATIENT
Start: 2018-08-31 | End: 2022-04-01 | Stop reason: SDUPTHER

## 2018-09-06 ENCOUNTER — OFFICE VISIT (OUTPATIENT)
Dept: FAMILY MEDICINE CLINIC | Age: 58
End: 2018-09-06

## 2018-09-06 VITALS
HEART RATE: 76 BPM | DIASTOLIC BLOOD PRESSURE: 70 MMHG | SYSTOLIC BLOOD PRESSURE: 120 MMHG | OXYGEN SATURATION: 98 % | WEIGHT: 243 LBS | BODY MASS INDEX: 38.06 KG/M2

## 2018-09-06 DIAGNOSIS — Z01.818 PREOP EXAMINATION: Primary | ICD-10-CM

## 2018-09-06 DIAGNOSIS — Z23 NEED FOR PROPHYLACTIC VACCINATION AGAINST STREPTOCOCCUS PNEUMONIAE (PNEUMOCOCCUS): ICD-10-CM

## 2018-09-06 PROCEDURE — 90732 PPSV23 VACC 2 YRS+ SUBQ/IM: CPT | Performed by: FAMILY MEDICINE

## 2018-09-06 PROCEDURE — 90471 IMMUNIZATION ADMIN: CPT | Performed by: FAMILY MEDICINE

## 2018-09-06 PROCEDURE — 99243 OFF/OP CNSLTJ NEW/EST LOW 30: CPT | Performed by: FAMILY MEDICINE

## 2018-09-06 ASSESSMENT — PATIENT HEALTH QUESTIONNAIRE - PHQ9
SUM OF ALL RESPONSES TO PHQ9 QUESTIONS 1 & 2: 0
SUM OF ALL RESPONSES TO PHQ QUESTIONS 1-9: 0
2. FEELING DOWN, DEPRESSED OR HOPELESS: 0
SUM OF ALL RESPONSES TO PHQ QUESTIONS 1-9: 0
1. LITTLE INTEREST OR PLEASURE IN DOING THINGS: 0

## 2018-09-06 NOTE — PROGRESS NOTES
surgery.     Yg Stewart M.D    [address] [place] [address], [address], [address] [place] [address]  [phone]

## 2018-09-11 ENCOUNTER — OFFICE VISIT (OUTPATIENT)
Dept: FAMILY MEDICINE CLINIC | Age: 58
End: 2018-09-11

## 2018-09-11 VITALS
BODY MASS INDEX: 38.69 KG/M2 | OXYGEN SATURATION: 98 % | RESPIRATION RATE: 18 BRPM | HEART RATE: 66 BPM | SYSTOLIC BLOOD PRESSURE: 126 MMHG | WEIGHT: 247 LBS | DIASTOLIC BLOOD PRESSURE: 72 MMHG | TEMPERATURE: 98.2 F

## 2018-09-11 DIAGNOSIS — J40 BRONCHITIS: ICD-10-CM

## 2018-09-11 DIAGNOSIS — R05.9 COUGH: ICD-10-CM

## 2018-09-11 PROCEDURE — 99213 OFFICE O/P EST LOW 20 MIN: CPT | Performed by: NURSE PRACTITIONER

## 2018-09-11 RX ORDER — AZITHROMYCIN 250 MG/1
TABLET, FILM COATED ORAL
Qty: 1 PACKET | Refills: 0 | Status: SHIPPED | OUTPATIENT
Start: 2018-09-11 | End: 2019-05-01 | Stop reason: ALTCHOICE

## 2018-09-11 ASSESSMENT — ENCOUNTER SYMPTOMS
SHORTNESS OF BREATH: 0
SINUS PAIN: 0
COUGH: 1
SPUTUM PRODUCTION: 1
WHEEZING: 0
HEMOPTYSIS: 0

## 2018-09-11 NOTE — PROGRESS NOTES
place, and time. Nursing note and vitals reviewed. Vitals:    09/11/18 1620   BP: 126/72   Pulse: 66   Resp: 18   Temp: 98.2 °F (36.8 °C)   SpO2: 98%     Assessment    1. Bronchitis    2. Cough        Plan    Alexander Allen was seen today for uri and cough. Diagnoses and all orders for this visit:    Bronchitis        -     Having sx this Friday. To get CXR before surgery, let us know by Thursday if symptoms worsen or not better. Add mucinex. Discussed most likely viral, pt worried it will be worsen before sx.         -     azithromycin (ZITHROMAX) 250 MG tablet; Take 2 tabs (500 mg) on Day 1, and take 1 tab (250 mg) on days 2 through 5. Cough  -     XR CHEST STANDARD (2 VW);  Future

## 2018-09-12 ENCOUNTER — NURSE ONLY (OUTPATIENT)
Dept: URGENT CARE | Age: 58
End: 2018-09-12

## 2018-09-12 DIAGNOSIS — R05.9 COUGH: ICD-10-CM

## 2018-09-12 RX ORDER — ATORVASTATIN CALCIUM 10 MG/1
TABLET, FILM COATED ORAL
Qty: 90 TABLET | Refills: 3 | Status: SHIPPED | OUTPATIENT
Start: 2018-09-12 | End: 2019-08-15 | Stop reason: SDUPTHER

## 2018-09-14 ENCOUNTER — TELEPHONE (OUTPATIENT)
Dept: PULMONOLOGY | Age: 58
End: 2018-09-14

## 2018-09-14 ENCOUNTER — TELEPHONE (OUTPATIENT)
Dept: FAMILY MEDICINE CLINIC | Age: 58
End: 2018-09-14

## 2018-09-14 DIAGNOSIS — J40 BRONCHITIS: Primary | ICD-10-CM

## 2018-09-14 RX ORDER — ALBUTEROL SULFATE 90 UG/1
2 AEROSOL, METERED RESPIRATORY (INHALATION) EVERY 6 HOURS PRN
Qty: 1 INHALER | Refills: 0 | Status: SHIPPED | OUTPATIENT
Start: 2018-09-14 | End: 2020-05-06 | Stop reason: CLARIF

## 2018-09-14 RX ORDER — METHYLPREDNISOLONE 4 MG/1
TABLET ORAL
Qty: 1 KIT | Refills: 0 | Status: SHIPPED | OUTPATIENT
Start: 2018-09-14 | End: 2018-09-20

## 2018-09-14 NOTE — TELEPHONE ENCOUNTER
Patient has been seen in our office for sleep apnea only. Recently being treated by PCP with Sukhdev Alvares. Needs to f/u with PCP for evaluation.
time.  · No driving motorized vehicles or operating heavy machinery while fatigue, drowsy or sleepy. · Weight loss is also recommended as a long-term intervention.     · Complications of DEE if not treated were discussed with patient patient to include systemic hypertension, pulmonary hypertension, cardiovascular morbidities, car accidents and all cause mortality.     · Follow up 1 year (per patient preference) sooner if needed

## 2018-09-14 NOTE — TELEPHONE ENCOUNTER
Would finish the course of antibiotics and do surgery when patient is better. Steroid inhaler will not help at this time.

## 2018-09-14 NOTE — TELEPHONE ENCOUNTER
Pt was scheduled to have hip surgery this morning and after getting the IV and everything they cancelled it because he has a chest cold. Pt stated he saw Brain Garza on Tuesday for bronchitis and cough and was given a Z-eryn. Pt stated the surgeon told him he needed to call his PCP and see if they will prescribe him a steroid inhaler to clear his chest before he can have surgery.

## 2019-03-15 ENCOUNTER — TELEPHONE (OUTPATIENT)
Dept: PULMONOLOGY | Age: 59
End: 2019-03-15

## 2019-05-01 ENCOUNTER — OFFICE VISIT (OUTPATIENT)
Dept: PULMONOLOGY | Age: 59
End: 2019-05-01
Payer: COMMERCIAL

## 2019-05-01 VITALS
SYSTOLIC BLOOD PRESSURE: 132 MMHG | BODY MASS INDEX: 37.51 KG/M2 | TEMPERATURE: 97.8 F | RESPIRATION RATE: 16 BRPM | OXYGEN SATURATION: 96 % | DIASTOLIC BLOOD PRESSURE: 69 MMHG | WEIGHT: 239 LBS | HEIGHT: 67 IN | HEART RATE: 91 BPM

## 2019-05-01 DIAGNOSIS — Z71.89 CPAP USE COUNSELING: ICD-10-CM

## 2019-05-01 DIAGNOSIS — G47.33 OSA ON CPAP: Primary | ICD-10-CM

## 2019-05-01 DIAGNOSIS — Z99.89 OSA ON CPAP: Primary | ICD-10-CM

## 2019-05-01 PROCEDURE — 99213 OFFICE O/P EST LOW 20 MIN: CPT | Performed by: NURSE PRACTITIONER

## 2019-05-01 ASSESSMENT — SLEEP AND FATIGUE QUESTIONNAIRES
HOW LIKELY ARE YOU TO NOD OFF OR FALL ASLEEP WHILE LYING DOWN TO REST IN THE AFTERNOON WHEN CIRCUMSTANCES PERMIT: 1
HOW LIKELY ARE YOU TO NOD OFF OR FALL ASLEEP WHILE SITTING AND TALKING TO SOMEONE: 0
HOW LIKELY ARE YOU TO NOD OFF OR FALL ASLEEP WHILE WATCHING TV: 0
HOW LIKELY ARE YOU TO NOD OFF OR FALL ASLEEP IN A CAR, WHILE STOPPED FOR A FEW MINUTES IN TRAFFIC: 0
HOW LIKELY ARE YOU TO NOD OFF OR FALL ASLEEP WHILE SITTING QUIETLY AFTER LUNCH WITHOUT ALCOHOL: 1
NECK CIRCUMFERENCE (INCHES): 16.25
ESS TOTAL SCORE: 2
HOW LIKELY ARE YOU TO NOD OFF OR FALL ASLEEP WHEN YOU ARE A PASSENGER IN A CAR FOR AN HOUR WITHOUT A BREAK: 0
HOW LIKELY ARE YOU TO NOD OFF OR FALL ASLEEP WHILE SITTING INACTIVE IN A PUBLIC PLACE: 0
HOW LIKELY ARE YOU TO NOD OFF OR FALL ASLEEP WHILE SITTING AND READING: 0

## 2019-05-01 NOTE — PATIENT INSTRUCTIONS
.Please keep all of your future appointments scheduled by [facility]monary office. Out of respect for other patients and providers, you may be asked to reschedule your appointment if you arrive later than your scheduled appointment time. Appointments cancelled less than 24hrs in advance will be considered a no show. Patients with three missed appointments within 1 year or four missed appointments within 2 years can be dismissed from the practice. Remember to bring your sleep machine, cord and mask to each appointment    You should have a follow up appointment scheduled with a sleep medicine provider within 12 months. Routine parts, masks, tubing and filters should all be obtainable from your DME (equipment) provider. Any problems related to mask fit, comfort or functioning of equipment should also be addressed directly with your DME provider. If you are unable to resolve problems, then please notify our office and schedule an appointment to be seen sooner than the usual follow up appointment. For all patients who are evaluated for sleep disorders, never drive or operate motorized equipment while sleepy, fatigued or groggy. Please bring in all of your sleep equipment to all of your appointments, including machine, mask, cords and hoses. Here are some tips to to getting better sleep  1- Avoid napping during the day: This will ensure you are tired at bedtime. If you have to take a nap, sleep less than one hour, before 3 pm.   2- Exercise regularly, but not right before bed: but the timing of the workout is important. Exercising in the morning or early afternoon will not interfere with sleep. Exercising within two hours before bedtime can decrease your ability to fall asleep. Regular exercise is recommended to help you deepen the sleep. 3- Avoid heavy, spicy, or sugary foods 4-6 hours before bedtime: These can affect your ability to stay asleep.   4- Have a light snack before bed: Having an empty stomach can interfere with your sleep. Dairy products and turkey contain tryptophan, which acts as a natural sleep inducer. 5- Stay away from caffeine, nicotine and alcohol at least 4-6 hours before bed: Caffeine and nicotine are stimulants that interfere with your ability to fall asleep. While alcohol has an immediate sleep-inducing effect, a few hours later, as alcohol levels in your blood start to fall, there is a stimulant effect and you will experience fragmented sleep. 6- Take a hot bath 90 minutes before bedtime:  A hot bath will raise your body temperature, but it is the drop in body temperature that may leave you feeling sleepy  7- Develop sleep rituals: it is important to give your body cues that it is time to slow down and sleep. Listen to relaxing music, read something soothing for 15 minutes, have a cup of caffeine free tea, or do relaxation exercises such as yoga or deep breathing help relieve anxiety and reduce muscle tension. 8- Fix a bedtime and an awakening time: Even on weekends! When your sleep cycle has a regular rhythm, you will feel better. 9- Sleep only when sleepy: This reduces the time you are awake in bed. 10- Get into your favorite sleeping position: If you can't fall asleep within 15-30 minutes, get up and do something boring until you feel sleepy. Sit quietly in the dark or read the warranty on your refrigerator. Don't expose yourself to bright light while you are up, it gives cues to your brain that it is time to wake up. 11- Only use your bed for sleeping: Dont use the bed as an office, workroom or recreation room. Let your body \"know\" that the bed is associated with sleeping  12- Use comfortable bedding. Uncomfortable bedding can prevent good sleep. Evaluate whether or not this is a source of your problem, and make appropriate changes. 13- Make sure your bed and bedroom are quiet and comfortable: A hot room can be uncomfortable.  A cooler room, along with enough blankets to stay warm is recommended. Get a blackout shade or wear a slumber mask and wear earplugs or get a \"white noise\" machine for light and noise distractions. 14- Use sunlight to set your biological clock: When you get up in the morning, go outside and turn your face to the sun for 15 minutes. 13- Dont take your worries to bed: Leave worries about job, school, daily life, etc., behind when you go to bed. Some people find it useful to assign a \"worry period\" during the evening or afternoon for these issues. CPAP Equipment Cleaning and Disinfecting Schedule  Equipment Cleaning Frequency Instructions  Disinfecting Frequency   Non-Disposable Filters  Weekly Mild soapy water, Rinse, Air Dry Not Required   Disposable Filters Change as needed  2-4 weeks Do Not Wash Not Required   Hose/tubing Daily Mild soapy water, Rinse, Air Dry Once a week   Mask / Nasal Pillows Daily Mild soapy water, Rinse, Air Dry Once a week   Headgear Weekly Hand wash, Mild soapy water, Rinse, Dry  Not Required   Humidifier Daily Empty water daily  Mild soapy water, Rinse well, Air Dry  Once a week   CPAP Unit As Needed Dust with damp cloth,  No detergents or sprays Not Required         Disinfect (per schedule) with 1 part white vinegar and 3 parts water- soak mask and water chamber for 30 minutes every 1-2 weeks, more often if sick. Allow water/vinegar mixture to run through tubing. Allow all equipment to air dry. Drying Hints:   Always hang tubing away from direct sunlight, as this will cause the tubing to become yellow, brittle and crack over a period of time. DO NOT attach the wet tubing to your CPAP unit to blow-dry it. The moisture from the tubing can drain back into your machine. Moisture in your unit can cause sudden pressure increases or short circuits  DO's and DON'Ts:  - Don't use alcohol-based products to clean your mask, because it can cause the materials to become hard and brittle.    - Don't put headgear in the washer or dryer  - Don't use any caustic or household cleaning solutions such as bleach on your CPAP   equipment.  - Do follow the recommended cleaning schedule. - Do change your disposable filter frequently. Adapted From: [email or web address]/cleaning. shtm.   These are general suggestions for all models please follow specific s recommendations and specific instructions

## 2019-05-01 NOTE — PROGRESS NOTES
CHIEF COMPLAINT: Sleep apnea      Evelyn Galarza is a 64 y.o. male in office for DEE follow up. Patient is using CPAP 5-8 hrs/night. He still falls asleep in his child's room then goes to bed and puts on CPAP. Using humidifier. No snoring on CPAP. The pressure is well tolerated. The mask is comfortable- full face. No mask leak. No significant daytime sleepiness. No nodding off when driving- which he states has greatly improved with CPAP use. No dry nose or throat. No fatigue. Bedtime is 1030pm-230 am and rise time is 7-9 am. Sleep onset is few-60 minutes usually more quickly unless has stuff on his mind. Wakes up few times at night total to reposition. 0-1 nocturia. It takes few minutes to fall back a sleep. Rare naps during the day. No headache in am. No weight gain. 4-5 caffienated beverages during the day. No alcohol. ESS is 2. Previous HPI 3/23/18  Evelyn Galarza is a 64 y.o. male in office for DEE follow up. At last visit compliance was suboptimal.  He is using CPAP nightly. STates CPAP has made a difference in how he feels, no longer nodding off when driving, less fatigue in day. Patient is using CPAP 4 hrs/night. Using humidifier. No snoring on CPAP. The pressure is well tolerated. The mask is comfortable- full face. Sometimes mask leak. No significant daytime sleepiness. No nodding off when driving. No dry nose or throat. No fatigue. Bedtime is 10 pm ( still going to bed in kids room then goes to his bed around 2am) and rise time is 7-730 am. Sleep onset is usually 15 minutes. Wakes up 2-3 times at night total- hip pain. 0-1 nocturia. It takes usually few minutes to fall back a sleep. Rare naps during the day. No headache in am. No weight gain. 4-5 caffienated beverages during the day. No alcohol. ESS is 7.     Past Medical History:   Diagnosis Date    [facility] ([facility] [facility]     Blood clotting disorder ([facility] [address] [facility]     Coronary artery disease 4/23/2014    Dr. Delicia Brewer hematology and Dr. Bronson Guthrie Cardiology    Hyperlipidemia     DEE on CPAP     Primary osteoarthritis of left hip 10/9/2017     Past Surgical History:   Procedure Laterality Date    CARDIAC CATHETERIZATION  2014    COLONOSCOPY      COLONOSCOPY  5/2/16    colon polyp, biopsy ascending lesion    DENTAL SURGERY  2015    FRACTURE SURGERY Right 2004    tib-fib    SKIN BIOPSY       No Known Allergies     Current Medications:    Current Outpatient Medications:     rivaroxaban (XARELTO) 20 MG TABS tablet, TAKE 1 TABLET BY MOUTH EVERY DAY, Disp: 90 tablet, Rfl: 5    albuterol sulfate HFA (PROVENTIL HFA) 108 (90 Base) MCG/ACT inhaler, Inhale 2 puffs into the lungs every 6 hours as needed for Wheezing, Disp: 1 Inhaler, Rfl: 0    atorvastatin (LIPITOR) 10 MG tablet, TAKE 1 TABLET NIGHTLY AT   BEDTIME, Disp: 90 tablet, Rfl: 3    azithromycin (ZITHROMAX) 250 MG tablet, Take 2 tabs (500 mg) on Day 1, and take 1 tab (250 mg) on days 2 through 5., Disp: 1 packet, Rfl: 0    aspirin 81 MG chewable tablet, CHEW 1 TABLET DAILY, Disp: 90 tablet, Rfl: 3    amLODIPine (NORVASC) 5 MG tablet, TAKE 1 TABLET DAILY, Disp: 90 tablet, Rfl: 3    VIRT-JAVI FORTE 2.5-25-2 MG TABS, TAKE 1 TABLET BY MOUTH DAILY, Disp: 30 tablet, Rfl: 0    nitroGLYCERIN (NITROSTAT) 0.4 MG SL tablet, Place 1 tablet under the tongue every 5 minutes as needed for Chest pain., Disp: 25 tablet, Rfl: 11      Objective:   PHYSICAL EXAM:    Resp. rate 16, height 5' 7\" (1.702 m), weight 239 lb (108.4 kg). ' on RA  Gen: No acute distress. Obese. BMI of 37.43  Eyes: No sclera icterus. No conjunctival injection  ENT: No discharge. Pharynx clear. Mallampati class IV. Elongated uvula. Neck: Trachea midline. No obvious mass. Neck circumference 16.25\"  Resp: No accessory muscle use. No crackles. Few wheezes. No rhonchi. .   CV: Regular rate. Regular rhythm. No murmur or rub. Skin: Warm and dry. M/S: No cyanosis. No obvious joint deformity. Neuro: Awake. Alert. Moves all four extremities.

## 2019-05-10 ENCOUNTER — APPOINTMENT (OUTPATIENT)
Dept: GENERAL RADIOLOGY | Age: 59
End: 2019-05-10
Payer: COMMERCIAL

## 2019-05-10 ENCOUNTER — HOSPITAL ENCOUNTER (EMERGENCY)
Age: 59
Discharge: HOME | End: 2019-05-10
Attending: EMERGENCY MEDICINE
Payer: COMMERCIAL

## 2019-05-10 VITALS
DIASTOLIC BLOOD PRESSURE: 82 MMHG | RESPIRATION RATE: 20 BRPM | BODY MASS INDEX: 37.28 KG/M2 | WEIGHT: 238 LBS | TEMPERATURE: 98 F | HEART RATE: 80 BPM | SYSTOLIC BLOOD PRESSURE: 123 MMHG | OXYGEN SATURATION: 92 %

## 2019-05-10 DIAGNOSIS — J40 BRONCHITIS: Primary | ICD-10-CM

## 2019-05-10 LAB — TROPONIN: <0.01 NG/ML

## 2019-05-10 PROCEDURE — 84484 ASSAY OF TROPONIN QUANT: CPT

## 2019-05-10 PROCEDURE — 99285 EMERGENCY DEPT VISIT HI MDM: CPT

## 2019-05-10 PROCEDURE — 6360000002 HC RX W HCPCS: Performed by: NURSE PRACTITIONER

## 2019-05-10 PROCEDURE — 94640 AIRWAY INHALATION TREATMENT: CPT

## 2019-05-10 PROCEDURE — 93005 ELECTROCARDIOGRAM TRACING: CPT | Performed by: EMERGENCY MEDICINE

## 2019-05-10 PROCEDURE — 6370000000 HC RX 637 (ALT 250 FOR IP): Performed by: NURSE PRACTITIONER

## 2019-05-10 PROCEDURE — 71046 X-RAY EXAM CHEST 2 VIEWS: CPT

## 2019-05-10 RX ORDER — ALBUTEROL SULFATE 2.5 MG/3ML
2.5 SOLUTION RESPIRATORY (INHALATION) ONCE
Status: COMPLETED | OUTPATIENT
Start: 2019-05-10 | End: 2019-05-10

## 2019-05-10 RX ORDER — PREDNISONE 10 MG/1
60 TABLET ORAL DAILY
Qty: 30 TABLET | Refills: 0 | Status: SHIPPED | OUTPATIENT
Start: 2019-05-10 | End: 2019-05-15

## 2019-05-10 RX ORDER — IPRATROPIUM BROMIDE AND ALBUTEROL SULFATE 2.5; .5 MG/3ML; MG/3ML
1 SOLUTION RESPIRATORY (INHALATION) ONCE
Status: COMPLETED | OUTPATIENT
Start: 2019-05-10 | End: 2019-05-10

## 2019-05-10 RX ORDER — PREDNISONE 20 MG/1
60 TABLET ORAL ONCE
Status: COMPLETED | OUTPATIENT
Start: 2019-05-10 | End: 2019-05-10

## 2019-05-10 RX ADMIN — IPRATROPIUM BROMIDE AND ALBUTEROL SULFATE 1 AMPULE: .5; 3 SOLUTION RESPIRATORY (INHALATION) at 21:12

## 2019-05-10 RX ADMIN — PREDNISONE 60 MG: 20 TABLET ORAL at 21:29

## 2019-05-10 RX ADMIN — ALBUTEROL SULFATE 2.5 MG: 2.5 SOLUTION RESPIRATORY (INHALATION) at 21:13

## 2019-05-11 LAB
EKG ATRIAL RATE: 64 BPM
EKG DIAGNOSIS: NORMAL
EKG P AXIS: 55 DEGREES
EKG P-R INTERVAL: 164 MS
EKG Q-T INTERVAL: 420 MS
EKG QRS DURATION: 88 MS
EKG QTC CALCULATION (BAZETT): 433 MS
EKG R AXIS: 68 DEGREES
EKG T AXIS: 54 DEGREES
EKG VENTRICULAR RATE: 64 BPM

## 2019-05-11 PROCEDURE — 93010 ELECTROCARDIOGRAM REPORT: CPT | Performed by: INTERNAL MEDICINE

## 2019-05-11 NOTE — ED PROVIDER NOTES
Occupational History    Not on file   Social Needs    Financial resource strain: Not on file    Food insecurity:     Worry: Not on file     Inability: Not on file    Transportation needs:     Medical: Not on file     Non-medical: Not on file   Tobacco Use    Smoking status: Current Every Day Smoker     Packs/day: 1.00     Years: 30.00     Pack years: 30.00     Types: Cigarettes     Start date: 1/1/1980    Smokeless tobacco: Never Used    Tobacco comment: 3/23/18 - smokes 1/2 ppd   Substance and Sexual Activity    Alcohol use: No     Alcohol/week: 0.0 oz    Drug use: No    Sexual activity: Yes     Partners: Female   Lifestyle    Physical activity:     Days per week: Not on file     Minutes per session: Not on file    Stress: Not on file   Relationships    Social connections:     Talks on phone: Not on file     Gets together: Not on file     Attends Yazdanism service: Not on file     Active member of club or organization: Not on file     Attends meetings of clubs or organizations: Not on file     Relationship status: Not on file    Intimate partner violence:     Fear of current or ex partner: Not on file     Emotionally abused: Not on file     Physically abused: Not on file     Forced sexual activity: Not on file   Other Topics Concern    Not on file   Social History Narrative    Not on file     No current facility-administered medications for this encounter.       Current Outpatient Medications   Medication Sig Dispense Refill    predniSONE (DELTASONE) 10 MG tablet Take 6 tablets by mouth daily for 5 doses 30 tablet 0    AMOXAPINE PO Take by mouth      rivaroxaban (XARELTO) 20 MG TABS tablet TAKE 1 TABLET BY MOUTH EVERY DAY 90 tablet 5    albuterol sulfate HFA (PROVENTIL HFA) 108 (90 Base) MCG/ACT inhaler Inhale 2 puffs into the lungs every 6 hours as needed for Wheezing 1 Inhaler 0    atorvastatin (LIPITOR) 10 MG tablet TAKE 1 TABLET NIGHTLY AT   BEDTIME 90 tablet 3    aspirin 81 MG chewable tablet CHEW 1 TABLET DAILY 90 tablet 3    amLODIPine (NORVASC) 5 MG tablet TAKE 1 TABLET DAILY 90 tablet 3    VIRT-JAVI FORTE 2.5-25-2 MG TABS TAKE 1 TABLET BY MOUTH DAILY 30 tablet 0    nitroGLYCERIN (NITROSTAT) 0.4 MG SL tablet Place 1 tablet under the tongue every 5 minutes as needed for Chest pain. 25 tablet 11     No Known Allergies    REVIEW OF SYSTEMS  10 systems reviewed, pertinent positives per HPI otherwise noted to be negative    PHYSICAL EXAM  /82   Pulse 80   Temp 98 °F (36.7 °C)   Resp 20   Wt 238 lb (108 kg)   SpO2 92%   BMI 37.28 kg/m²   GENERAL APPEARANCE: Awake and alert. Cooperative. No acute distress. Vital signs are stable. Well appearing and non toxic. HEAD: Normocephalic. Atraumatic. EYES: PERRL. EOM's grossly intact. ENT: Mucous membranes are moist. Bilateral tympanic membranes are intact, no erythema, no perforation, no drainage, no bulging or effusions. Bilateral ear canals are narrow, no erythema, nontender, no drainage. NECK: Supple. Normal ROM. HEART: RRR. No murmurs. Distal pulses are equal and intact. Cap refill less than 2 seconds. LUNGS: Respirations unlabored. Expiratory wheezing auscultated bilaterally in all lung fields. No rhonchi, rales, stridor. Good air exchange. Speaking comfortably in full sentences. CHEST: non tender to palpation   ABDOMEN: Soft. Non-distended. Non-tender. No guarding or rebound. No masses. No organomegaly. No rigidity. EXTREMITIES: No peripheral edema. Moves all extremities equally. All extremities neurovascularly intact. SKIN: Warm and dry. No acute rashes. NEUROLOGICAL: Alert and oriented. CN's 2-12 intact. No gross facial drooping. Strength 5/5, sensation intact. No dysarthria. No dysmetria. No ataxia. PSYCHIATRIC: Normal mood and affect.     RADIOLOGY  Xr Chest Standard (2 Vw)    Result Date: 5/10/2019  EXAMINATION: TWO XRAY VIEWS OF THE CHEST 5/10/2019 7:54 pm COMPARISON: 09/12/2018 HISTORY: ORDERING SYSTEM PROVIDED HISTORY: sob TECHNOLOGIST PROVIDED HISTORY: Reason for exam:->sob Ordering Physician Provided Reason for Exam: hypoxia Acuity: Acute Type of Exam: Initial FINDINGS: Heart size and pulmonary vessels within normal limits. Thoracic aorta slightly tortuous. Bandlike opacities in the lung bases. No suspicious infiltrate or edema     Bibasilar atelectasis         ED COURSE  I have evaluated this patient in collaboration with [address] [place] [id number]. Patient presents to the emergency department for evaluation of difficulty hearing in his left ear and chest congestion. Patient was sent by the [facility] because they were unable to obtain an oxygen saturation. Patient's oxygen saturation while in the emergency department on room air is 92-95%. Patient given a albuterol and a DuoNeb breathing treatment in the emergency department. Patient was then ambulated with no respiratory distress, no dyspnea, oxygen saturation remained above 92% on room air. Patient also given prednisone in the ED. I have reviewed all radiology, and physical exam findings with my attending physician. After discussion we both agree patient is stable for discharge home. Patient was given prescriptions for doxycycline, albuterol, and fluticasone at the [facility] clinic. Patient instructed to fill these prescriptions. Strict return precautions discussed. Patient instructed to follow up with his primary care physician. Patient verbalized understanding. Patient instructed to return to the emergency department with any new or worsening symptoms. Patient is agreeable with plan of care and denies any questions at this time. Patient was sent home with a prescription for prednisone.       MDM    Results for orders placed or performed during the hospital encounter of 05/10/19   Troponin   Result Value Ref Range    Troponin <0.01 <0.01 ng/mL   EKG 12 Lead   Result Value Ref Range    Ventricular Rate 64 BPM    Atrial Rate 64 BPM    P-R Interval 164 ms QRS Duration 88 ms    Q-T Interval 420 ms    QTc Calculation (Bazett) 433 ms    P Axis 55 degrees    R Axis 68 degrees    T Axis 54 degrees    Diagnosis       Normal sinus rhythm with sinus arrhythmiaNormal ECGNo previous ECGs available       I estimate there is LOW risk for PULMONARY EMBOLISM, ACUTE CORONARY SYNDROME, OR THORACIC AORTIC DISSECTION, thus I consider the discharge disposition reasonable. Yola Estrada and I have discussed the diagnosis and risks, and we agree with discharging home to follow-up with their primary doctor. We also discussed returning to the Emergency Department immediately if new or worsening symptoms occur. We have discussed the symptoms which are most concerning (e.g., bloody sputum, fever, worsening pain or shortness of breath, vomiting) that necessitate immediate return. FINAL Impression    1. Bronchitis        Blood pressure 123/82, pulse 80, temperature 98 °F (36.7 °C), resp. rate 20, weight 238 lb (108 kg), SpO2 92 %. DISPOSITION  Patient was discharged to home in good condition.           Doris Johnson, GERARD - CNP  05/10/19 [id number]

## 2019-05-11 NOTE — ED NOTES
Ambulating pulse ox 92-93% -105 while ambulating, denies dizziness, pain, discomfort, complains of Shortness of breath when he takes deep breaths only. Denies any at this time.       Carlos Enrique Mantilla RN  05/10/19 2124

## 2019-05-11 NOTE — ED PROVIDER NOTES
I independently performed a history and physical on Taras Amaya. All diagnostic, treatment, and disposition decisions were made by myself in conjunction with the advanced practice provider. For further details of Janny Hernandes's emergency department encounter, please see Nayeli Heath NP's documentation. Patient is a 53-year-old male presenting today with shortness of breath with cough over the last 3 weeks but then noticing today it felt like his ears were clogged worse on the left side. Denies any actual chest pain. No pain with inspiration. By the time I saw the patient he had artery received breathing treatments and was feeling much better and wanting to go home. He does smoke but is trying to quit. He is on Xarelto due to history of antiphospholipid syndrome but denies any new leg swelling and he has not missed any doses. No recent long travel. He states multiple family members at home have similar symptoms. Denies any significant headache. No vomiting or diarrhea. No neck stiffness. No other concerns at this time and again in regards to the shortness of breath it has already improved. Physical:   Gen: No acute distress. AOx3. Pysch: Normal mood and affect  HEENT: NCAT, EOMI, PERRL, MMM, normal right TM, left TM mildly injected, no mastoid tenderness bilaterally, no pharyngeal erythema, no rhinorrhea  Neck: supple, normal range of motion, no nuchal rigidity, no cervical lymphadenopathy  Cardiac: RRR, pulses 2+ in upper extremities  Lungs: C2AB, no R/R/W, no respiratory distress  Abdomen: soft and nontender with no R/D/G  Neuro: no focal neuro deficits with strength and sensation 5/5 in all 4 extremities    The Ekg interpreted by me shows  normal sinus rhythm and sinus arrhythmia with a rate of 64  Axis is   Normal  QTc is  normal  Intervals and Durations are unremarkable.       ST Segments: no acute change and normal  No significant change from prior EKG dated - 4/7/14  No STEMI

## 2019-05-15 ENCOUNTER — TELEPHONE (OUTPATIENT)
Dept: ADMINISTRATIVE | Age: 59
End: 2019-05-15

## 2019-08-16 ENCOUNTER — TELEPHONE (OUTPATIENT)
Dept: CARDIOLOGY CLINIC | Age: 59
End: 2019-08-16

## 2019-08-16 RX ORDER — AMLODIPINE BESYLATE 5 MG/1
TABLET ORAL
Qty: 90 TABLET | Refills: 0 | Status: SHIPPED | OUTPATIENT
Start: 2019-08-16 | End: 2019-11-13 | Stop reason: SDUPTHER

## 2019-08-16 RX ORDER — ASPIRIN 81 MG/1
TABLET, CHEWABLE ORAL
Qty: 90 TABLET | Refills: 0 | Status: SHIPPED | OUTPATIENT
Start: 2019-08-16 | End: 2019-09-10 | Stop reason: SDUPTHER

## 2019-08-16 RX ORDER — ATORVASTATIN CALCIUM 10 MG/1
TABLET, FILM COATED ORAL
Qty: 90 TABLET | Refills: 0 | Status: SHIPPED | OUTPATIENT
Start: 2019-08-16 | End: 2019-11-26 | Stop reason: SDUPTHER

## 2019-09-10 ENCOUNTER — TELEPHONE (OUTPATIENT)
Dept: CARDIOLOGY CLINIC | Age: 59
End: 2019-09-10

## 2019-09-10 ENCOUNTER — OFFICE VISIT (OUTPATIENT)
Dept: CARDIOLOGY CLINIC | Age: 59
End: 2019-09-10
Payer: COMMERCIAL

## 2019-09-10 VITALS
WEIGHT: 238 LBS | DIASTOLIC BLOOD PRESSURE: 70 MMHG | HEIGHT: 67 IN | SYSTOLIC BLOOD PRESSURE: 110 MMHG | BODY MASS INDEX: 37.35 KG/M2 | HEART RATE: 63 BPM | OXYGEN SATURATION: 95 %

## 2019-09-10 DIAGNOSIS — Z72.0 TOBACCO ABUSE: ICD-10-CM

## 2019-09-10 DIAGNOSIS — I25.10 CORONARY ARTERY DISEASE INVOLVING NATIVE CORONARY ARTERY OF NATIVE HEART WITHOUT ANGINA PECTORIS: Primary | ICD-10-CM

## 2019-09-10 DIAGNOSIS — E66.9 OBESITY (BMI 30-39.9): ICD-10-CM

## 2019-09-10 PROCEDURE — 99214 OFFICE O/P EST MOD 30 MIN: CPT | Performed by: INTERNAL MEDICINE

## 2019-09-10 NOTE — TELEPHONE ENCOUNTER
9-10-19 Lm at [phone] informing pt to call back to schedule echo per ov with vsp today. Left central scheduling and office #'s for pt to reach.

## 2019-09-10 NOTE — PROGRESS NOTES
Immunology ROS: negative for - hives or itchy/watery eyes  Hematological and Lymphatic ROS: negative for - jaundice or night sweats  Endocrine ROS: negative for - mood swings or temperature intolerance  Breast ROS: deferred  Respiratory ROS: negative for - hemoptysis or stridor  Cardiovascular ROS: negative for - chest pain, dyspnea on exertion or palpitations  Gastrointestinal ROS: no abdominal pain, change in bowel habits, or black or bloody stools  Genito-Urinary ROS: no dysuria, trouble voiding, or hematuria  Musculoskeletal ROS: negative for - gait disturbance, joint pain or joint stiffness  Neurological ROS: negative for - seizures or speech problems  Dermatological ROS: negative for - rash or skin lesion changes        Physical Examination:    Vitals:    09/10/19 0756   BP: 110/70   Pulse: 63   SpO2: 95%    Weight: 238 lb (108 kg)     Wt Readings from Last 3 Encounters:   09/10/19 238 lb (108 kg)   05/10/19 238 lb (108 kg)   05/01/19 239 lb (108.4 kg)       General Appearance:  Alert, cooperative, no distress, appears stated age   Head:  Normocephalic, without obvious abnormality, atraumatic   Eyes:  PERRL, conjunctiva/corneas clear       Nose: Nares normal, no drainage or sinus tenderness   Throat: Lips, mucosa, and tongue normal   Neck: Supple, symmetrical, trachea midline, no adenopathy, thyroid: not enlarged, symmetric, no tenderness/mass/nodules, no carotid bruit or JVD       Lungs:   Coarse breath sounds, respirations unlabored   Chest Wall:  No tenderness or deformity   Heart:  Regular rhythm and normal rate; S1, S2 are normal; no murmur noted; no rub or gallop   Abdomen:   Soft, non-tender, bowel sounds active all four quadrants,  no masses, no organomegaly           Extremities: Extremities normal, atraumatic, no cyanosis or edema   Pulses: 2+ and symmetric   Skin: Skin color, texture, turgor normal, no rashes or lesions   Pysch: Normal mood and affect   Neurologic: Normal gross motor and sensory recommend that the patient continue their currently prescribed medications. Their drug modifiable risk factors appear to be well controlled. I will continue to address the need/dosing of medications in future visits. 3. Check Lipids, LFTs, CBC and BMP. 4. An echocardiogram will be ordered. This will allow review of the patient's left ventricular function and determine any valve abnormalities. The pericardium and other structures will also be evaluated. 5. Follow up with me in 1 year. This note was scribed in the presence of Dr. Sandra Nair MD by Huma Ocampo RN.      I, Dr. Sandra Nair, personally performed the services described in this documentation, as scribed by the above signed scribe in my presence. It is both accurate and complete to my knowledge. I agree with the details independently gathered by the clinical support staff, while the remaining scribed note accurately describes my personal service to the patient. All questions and concerns were addressed to the patient/family. Alternatives to my treatment were discussed. The note was completed using EMR. Every effort was made to ensure accuracy; however, inadvertent computerized transcription errors may be present.     Sandra Nair MD, [place], [place]  [phone] [facility]  [phone] [facility]  9/10/2019  8:10 AM

## 2019-09-16 ENCOUNTER — HOSPITAL ENCOUNTER (OUTPATIENT)
Age: 59
Discharge: HOME | End: 2019-09-16
Payer: COMMERCIAL

## 2019-09-16 DIAGNOSIS — Z72.0 TOBACCO ABUSE: ICD-10-CM

## 2019-09-16 DIAGNOSIS — I25.10 CORONARY ARTERY DISEASE INVOLVING NATIVE CORONARY ARTERY OF NATIVE HEART WITHOUT ANGINA PECTORIS: ICD-10-CM

## 2019-09-16 DIAGNOSIS — E66.9 OBESITY (BMI 30-39.9): ICD-10-CM

## 2019-09-16 LAB
ALBUMIN SERPL-MCNC: 4.5 G/DL (ref 3.4–5)
ALP BLD-CCNC: 74 U/L (ref 40–129)
ALT SERPL-CCNC: 29 U/L (ref 10–40)
ANION GAP SERPL CALCULATED.3IONS-SCNC: 17 MMOL/L (ref 3–16)
AST SERPL-CCNC: 20 U/L (ref 15–37)
BASOPHILS ABSOLUTE: 0 K/UL (ref 0–0.2)
BASOPHILS RELATIVE PERCENT: 0.5 %
BILIRUB SERPL-MCNC: 0.5 MG/DL (ref 0–1)
BILIRUBIN DIRECT: <0.2 MG/DL (ref 0–0.3)
BILIRUBIN, INDIRECT: NORMAL MG/DL (ref 0–1)
BUN BLDV-MCNC: 18 MG/DL (ref 7–20)
CALCIUM SERPL-MCNC: 9.2 MG/DL (ref 8.3–10.6)
CHLORIDE BLD-SCNC: 107 MMOL/L (ref 99–110)
CHOLESTEROL, TOTAL: 100 MG/DL (ref 0–199)
CO2: 21 MMOL/L (ref 21–32)
CREAT SERPL-MCNC: 0.7 MG/DL (ref 0.9–1.3)
EOSINOPHILS ABSOLUTE: 0.1 K/UL (ref 0–0.6)
EOSINOPHILS RELATIVE PERCENT: 1.1 %
GFR AFRICAN AMERICAN: >60
GFR NON-AFRICAN AMERICAN: >60
GLUCOSE BLD-MCNC: 101 MG/DL (ref 70–99)
HCT VFR BLD CALC: 43.5 % (ref 40.5–52.5)
HDLC SERPL-MCNC: 32 MG/DL (ref 40–60)
HEMOGLOBIN: 15.1 G/DL (ref 13.5–17.5)
LDL CHOLESTEROL CALCULATED: 47 MG/DL
LYMPHOCYTES ABSOLUTE: 1.2 K/UL (ref 1–5.1)
LYMPHOCYTES RELATIVE PERCENT: 15.8 %
MCH RBC QN AUTO: 29.9 PG (ref 26–34)
MCHC RBC AUTO-ENTMCNC: 34.7 G/DL (ref 31–36)
MCV RBC AUTO: 86.3 FL (ref 80–100)
MONOCYTES ABSOLUTE: 0.5 K/UL (ref 0–1.3)
MONOCYTES RELATIVE PERCENT: 6.6 %
NEUTROPHILS ABSOLUTE: 5.8 K/UL (ref 1.7–7.7)
NEUTROPHILS RELATIVE PERCENT: 76 %
PDW BLD-RTO: 14 % (ref 12.4–15.4)
PLATELET # BLD: 144 K/UL (ref 135–450)
PMV BLD AUTO: 8.6 FL (ref 5–10.5)
POTASSIUM SERPL-SCNC: 4 MMOL/L (ref 3.5–5.1)
RBC # BLD: 5.03 M/UL (ref 4.2–5.9)
SODIUM BLD-SCNC: 145 MMOL/L (ref 136–145)
TOTAL PROTEIN: 6.9 G/DL (ref 6.4–8.2)
TRIGL SERPL-MCNC: 106 MG/DL (ref 0–150)
VLDLC SERPL CALC-MCNC: 21 MG/DL
WBC # BLD: 7.7 K/UL (ref 4–11)

## 2019-09-16 PROCEDURE — 36415 COLL VENOUS BLD VENIPUNCTURE: CPT

## 2019-09-16 PROCEDURE — 80076 HEPATIC FUNCTION PANEL: CPT

## 2019-09-16 PROCEDURE — 80061 LIPID PANEL: CPT

## 2019-09-16 PROCEDURE — 85025 COMPLETE CBC W/AUTO DIFF WBC: CPT

## 2019-09-16 PROCEDURE — 80048 BASIC METABOLIC PNL TOTAL CA: CPT

## 2019-09-17 ENCOUNTER — TELEPHONE (OUTPATIENT)
Dept: CARDIOLOGY CLINIC | Age: 59
End: 2019-09-17

## 2019-09-17 NOTE — TELEPHONE ENCOUNTER
----- Message from Tiffanie Cullen MD sent at 9/17/2019  8:47 AM EDT -----  I reviewed the labs and see no major change or issues compared to prior studies. Continue current medical regimen. Call patient with results and recommendations.

## 2019-09-25 ENCOUNTER — HOSPITAL ENCOUNTER (OUTPATIENT)
Dept: NON INVASIVE DIAGNOSTICS | Age: 59
Discharge: HOME | End: 2019-09-25
Payer: COMMERCIAL

## 2019-09-25 DIAGNOSIS — E66.9 OBESITY (BMI 30-39.9): ICD-10-CM

## 2019-09-25 DIAGNOSIS — Z72.0 TOBACCO ABUSE: ICD-10-CM

## 2019-09-25 DIAGNOSIS — I25.10 CORONARY ARTERY DISEASE INVOLVING NATIVE CORONARY ARTERY OF NATIVE HEART WITHOUT ANGINA PECTORIS: ICD-10-CM

## 2019-09-25 LAB
LV EF: 55 %
LVEF MODALITY: NORMAL

## 2019-09-25 PROCEDURE — 93306 TTE W/DOPPLER COMPLETE: CPT

## 2019-09-26 ENCOUNTER — TELEPHONE (OUTPATIENT)
Dept: CARDIOLOGY CLINIC | Age: 59
End: 2019-09-26

## 2019-09-26 DIAGNOSIS — I77.810 AORTIC ROOT DILATION (HCC): Primary | ICD-10-CM

## 2019-09-30 ENCOUNTER — TELEPHONE (OUTPATIENT)
Dept: CARDIOLOGY CLINIC | Age: 59
End: 2019-09-30

## 2019-10-04 ENCOUNTER — TELEPHONE (OUTPATIENT)
Dept: CARDIOLOGY CLINIC | Age: 59
End: 2019-10-04

## 2019-10-04 ENCOUNTER — HOSPITAL ENCOUNTER (OUTPATIENT)
Dept: CT IMAGING | Age: 59
Discharge: HOME | End: 2019-10-04
Payer: COMMERCIAL

## 2019-10-04 DIAGNOSIS — I77.810 AORTIC ROOT DILATION (HCC): ICD-10-CM

## 2019-10-04 PROCEDURE — 6360000004 HC RX CONTRAST MEDICATION: Performed by: INTERNAL MEDICINE

## 2019-10-04 PROCEDURE — 74174 CTA ABD&PLVS W/CONTRAST: CPT

## 2019-10-04 RX ADMIN — IOPAMIDOL 75 ML: 755 INJECTION, SOLUTION INTRAVENOUS at 08:38

## 2019-11-13 RX ORDER — AMLODIPINE BESYLATE 5 MG/1
TABLET ORAL
Qty: 90 TABLET | Refills: 3 | Status: SHIPPED | OUTPATIENT
Start: 2019-11-13 | End: 2020-10-26

## 2019-11-13 RX ORDER — ASPIRIN 81 MG/1
TABLET, CHEWABLE ORAL
Qty: 90 TABLET | Refills: 3 | Status: SHIPPED | OUTPATIENT
Start: 2019-11-13 | End: 2020-05-06 | Stop reason: CLARIF

## 2019-11-26 RX ORDER — ATORVASTATIN CALCIUM 10 MG/1
TABLET, FILM COATED ORAL
Qty: 90 TABLET | Refills: 3 | Status: SHIPPED | OUTPATIENT
Start: 2019-11-26 | End: 2020-11-09

## 2020-04-24 ENCOUNTER — TELEPHONE (OUTPATIENT)
Dept: PULMONOLOGY | Age: 60
End: 2020-04-24

## 2020-04-24 NOTE — TELEPHONE ENCOUNTER
Within this Telehealth Consent, the terms you and yours refer to the person using the Telehealth Service (Service), or in the case of a use of the Service by or on behalf of a minor, you and yours refer to and include (i) the parent or legal guardian who provides consent to the use of the Service by such minor or uses the Service on behalf of such minor, and (ii) the minor for whom consent is being provided or on whose behalf the Service is being utilized. When using Service, you will be consulting with your health care providers via the use of Telehealth.   Telehealth involves the delivery of healthcare services using electronic communications, information technology or other means between a healthcare provider and a patient who are not in the same physical location. Telehealth may be used for diagnosis, treatment, follow-up and/or patient education, and may include, but is not limited to, one or more of the following:    Electronic transmission of medical records, photo images, personal health information or other data between a patient and a healthcare provider    Interactions between a patient and healthcare provider via audio, video and/or data communications    Use of output data from medical devices, sound and video files    Anticipated Benefits   The use of Telehealth by your Provider(s) through the Service may have the following possible benefits:    Making it easier and more efficient for you to access medical care and treatment for the conditions treated by such Provider(s) utilizing the Service    Allowing you to obtain medical care and treatment by Provider(s) at times that are convenient for you    Enabling you to interact with Provider(s) without the necessity of an in-office appointment     Possible Risks   While the use of Telehealth can provide potential benefits for you, there are also potential risks associated with the use of Telehealth.  These risks include, but may not be the provision of medical care and treatment via Telehealth and the Service and you may not be able to receive diagnosis and/or treatment through the Service for every condition for which you seek diagnosis and/or treatment. 11. There are potential risks to the use of Telehealth, including but not limited to the risks described in this Telehealth Consent. 12. Your Provider(s) have discussed the use of Telehealth and the Service with you, including the benefits and risks of such and you have provided oral consent to your Provider(s) for the use of Telehealth and the Service. 15. You understand that it is your duty to provide your Provider(s) truthful, accurate and complete information, including all relevant information regarding care that you may have received or may be receiving from other healthcare providers outside of the Service. 14. You understand that each of your Provider(s) may determine in his or sole discretion that your condition is not suitable for diagnosis and/or treatment using the Service, and that you may need to seek medical care and treatment a specialist or other healthcare provider, outside of the Service. 15. You understand that you are fully responsible for payment for all services provided by Provider(s) or through use of the Service and that you may not be able to use third-party insurance. 16. You represent that (a) you have read this Telehealth Consent carefully, (b) you understand the risks and benefits of the Service and the use of Telehealth in the medical care and treatment provided to you by Provider(s) using the Service, and (c) you have the legal capacity and authority to provide this consent for yourself and/or the minor for which you are consenting under applicable federal and state laws, including laws relating to the age of [de-identified] and/or parental/guardian consent.    17. You give your informed consent to the use of Telehealth by Provider(s) using the Service under

## 2020-05-06 ENCOUNTER — VIRTUAL VISIT (OUTPATIENT)
Dept: PULMONOLOGY | Age: 60
End: 2020-05-06
Payer: COMMERCIAL

## 2020-05-06 PROCEDURE — 99213 OFFICE O/P EST LOW 20 MIN: CPT | Performed by: NURSE PRACTITIONER

## 2020-05-06 ASSESSMENT — SLEEP AND FATIGUE QUESTIONNAIRES
HOW LIKELY ARE YOU TO NOD OFF OR FALL ASLEEP IN A CAR, WHILE STOPPED FOR A FEW MINUTES IN TRAFFIC: 0
HOW LIKELY ARE YOU TO NOD OFF OR FALL ASLEEP WHILE SITTING INACTIVE IN A PUBLIC PLACE: 0
HOW LIKELY ARE YOU TO NOD OFF OR FALL ASLEEP WHEN YOU ARE A PASSENGER IN A CAR FOR AN HOUR WITHOUT A BREAK: 2
ESS TOTAL SCORE: 5
HOW LIKELY ARE YOU TO NOD OFF OR FALL ASLEEP WHILE SITTING QUIETLY AFTER LUNCH WITHOUT ALCOHOL: 1
HOW LIKELY ARE YOU TO NOD OFF OR FALL ASLEEP WHILE WATCHING TV: 0
HOW LIKELY ARE YOU TO NOD OFF OR FALL ASLEEP WHILE SITTING AND READING: 1
HOW LIKELY ARE YOU TO NOD OFF OR FALL ASLEEP WHILE LYING DOWN TO REST IN THE AFTERNOON WHEN CIRCUMSTANCES PERMIT: 1
HOW LIKELY ARE YOU TO NOD OFF OR FALL ASLEEP WHILE SITTING AND TALKING TO SOMEONE: 0

## 2020-05-06 NOTE — PATIENT INSTRUCTIONS
Remember to bring all pulmonary medications to your next appointment with the office. Please keep all of your future appointments scheduled by [address], [facility] office. Out of respect for other patients and providers, you may be asked to reschedule your appointment if you arrive later than your scheduled appointment time. Appointments cancelled less than 24hrs in advance will be considered a no show. Patients with three missed appointments within 1 year or four missed appointments within 2 years can be dismissed from the practice. You may receive a survey regarding the care you received during your visit. Your input is valuable to us. We encourage you to complete and return your survey. We hope you will choose us in the future for your healthcare needs. Sleep Hygiene. .. Tips for better sleep. .. Avoid naps. This will ensure you are sleepy at bedtime. If you have to take a nap, sleep less than 1 hour, before 3 pm.  Sleep only when sleepy; this reduces the time you are awake in bed. Regular exercise is recommended to help you deepen your sleep, but not within 4-6 hours of your bedtime. Timing of exercise is important, aim to exercise early in the morning or early afternoon. A light snack may help you fall asleep. Warm milk and foods high in the amino acid tryptophan, such as bananas, may help you to sleep  Be sure to avoid heavy, spicy or sugary foods 4-6 hours before bedtime and avoid at snack time. Stay away from stimulants such as caffeine and nicotine for at least 4-6 hours before bed. Stimulants can interfere with your ability to fall asleep. Caffeine is found in tea, cola, coffee, cocoa and chocolate and is best avoided at bedtime. Nicotine is found in tobacco products. Avoid alcohol 4-6 hours before bedtime.  Alcohol has an immediate sleep-inducing effect, after a few hours when alcohol levels fall there is a stimulant or wake-up effect and will cause fragmented sleep. Sleep rituals are important. Give your body clues it is time to slow down and sleep. Examples include; yoga, deep breathing, listen to relaxing music, a hot bath or a few minutes of reading. Have a fixed bedtime and awakening time, Even on weekends! You will feel better keeping a regular sleep cycle, even if you are retired or not working. Get into your favorite sleep position. If not asleep in 30 minutes, get up and do something boring until you feel sleepy. Remember not to expose yourself to bright lights such as TV, phone or tablet screens. Only use your bed for sleeping. Do not use your bed as an office, workroom or recreation room. Use comfortable bedding. Uncomfortable bedding can prevent good sleep. Ensure your bedroom is quiet and comfortable. A cooler room along with enough blankets to stay warm is recommended. If your room is too noisy, try a white noise machine. If too bright, try black out shades or an eye mask. Dont take worries to bed. Leave worries about work, school etc. behind you when you go to bed. Some people find it helpful to assign a worry period in the evening or late afternoon to write down your worries and get them out of your system.      CPAP Equipment Cleaning and Disinfecting Schedule  Equipment Cleaning Frequency Instructions  Disinfecting Frequency   Non-Disposable Filters  Weekly Mild soapy water, Rinse, Air Dry Not Required   Disposable Filters Change as needed  2-4 weeks Do Not Wash Not Required   Hose/tubing Daily Mild soapy water, Rinse, Air Dry Once a week   Mask / Nasal Pillows Daily Mild soapy water, Rinse, Air Dry Once a week   Headgear Weekly Hand wash, Mild soapy water, Rinse, Dry  Not Required   Humidifier Daily Empty water daily  Mild soapy water, Rinse well, Air Dry  Once a week   CPAP Unit As Needed Dust with damp cloth,  No detergents or sprays Not Required         Disinfect (per schedule) with 1 part white vinegar and 3 parts water- soak mask and water chamber for 30 minutes every 1-2 weeks, more often if sick. Allow water/vinegar mixture to run through tubing. Allow all equipment to air dry. Drying Hints:   Always hang tubing away from direct sunlight, as this will cause the tubing to become yellow, brittle and crack over a period of time. DO NOT attach the wet tubing to your CPAP unit to blow-dry it. The moisture from the tubing can drain back into your machine. Moisture in your unit can cause sudden pressure increases or short circuits  DO's and DON'Ts:  - Don't use alcohol-based products to clean your mask, because it can cause the materials to become hard and brittle. - Don't put headgear in the washer or dryer  - Don't use any caustic or household cleaning solutions such as bleach on your CPAP   equipment.  - Do follow the recommended cleaning schedule. - Do change your disposable filter frequently. Adapted From: [employer]/cleaning. shtm.   These are general suggestions for all models please follow specific s recommendations and specific instructions

## 2020-05-06 NOTE — PROGRESS NOTES
MA Communication: The following orders are received by verbal communication from Maryanne Brittle, CNP.     Orders include:  Order faxed to [facility]       1 year fu scheduled 5/5/21
seek emergency medical treatment and/or call 911 if deemed necessary. Patient identification was verified at the start of the visit: Yes    Total time spent for this encounter: Not billed by time    Services were provided through a video synchronous discussion virtually to substitute for in-person clinic visit. Patient and provider were located at their individual homes. --GERARD Maria - CNP on 5/6/2020 at 8:46 AM    An electronic signature was used to authenticate this note.

## 2020-07-09 ENCOUNTER — TELEPHONE (OUTPATIENT)
Dept: FAMILY MEDICINE CLINIC | Age: 60
End: 2020-07-09

## 2020-07-20 ENCOUNTER — TELEPHONE (OUTPATIENT)
Dept: CARDIOLOGY CLINIC | Age: 60
End: 2020-07-20

## 2020-07-20 NOTE — LETTER
[facility] - [address] [facility]  Phone: [phone]  Fax: [phone]    Emilie Coy MD        July 20, 2020     Chana Angulo  [facility],     The patient's cardiac condition is not prohibitory to undergo surgery. The patient's cardiac risk is moderate based upon my evaluation and testing. Stable to proceed.      Stop Xarelto 48 hours prior. If you have any questions or concerns, please don't hesitate to call.     Sincerely,        Emilie Coy MD

## 2020-07-20 NOTE — TELEPHONE ENCOUNTER
The patient's cardiac condition is not prohibitory to undergo surgery. The patient's cardiac risk is moderate based upon my evaluation and testing. Stable to proceed.      Stop Xarelto 48 hours prior

## 2020-07-20 NOTE — TELEPHONE ENCOUNTER
CARDIAC CLEARANCE REQUEST    What type of procedure are you having: hip replacement    Are you taking any blood thinners: ASA(needs to stop 1 week prior to surgery) and Xarelto (need to know when to stop).     When is your procedure scheduled for: 8/04/2020    What physician is performing your procedure: Dr Jordan Hma    Phone Number:[phone]

## 2020-07-23 ENCOUNTER — OFFICE VISIT (OUTPATIENT)
Dept: FAMILY MEDICINE CLINIC | Age: 60
End: 2020-07-23
Payer: COMMERCIAL

## 2020-07-23 VITALS
OXYGEN SATURATION: 97 % | WEIGHT: 233 LBS | BODY MASS INDEX: 36.49 KG/M2 | DIASTOLIC BLOOD PRESSURE: 60 MMHG | HEART RATE: 56 BPM | SYSTOLIC BLOOD PRESSURE: 100 MMHG | TEMPERATURE: 98 F

## 2020-07-23 PROCEDURE — 99243 OFF/OP CNSLTJ NEW/EST LOW 30: CPT | Performed by: NURSE PRACTITIONER

## 2020-07-23 PROCEDURE — 93000 ELECTROCARDIOGRAM COMPLETE: CPT | Performed by: NURSE PRACTITIONER

## 2020-07-23 ASSESSMENT — PATIENT HEALTH QUESTIONNAIRE - PHQ9
2. FEELING DOWN, DEPRESSED OR HOPELESS: 0
SUM OF ALL RESPONSES TO PHQ9 QUESTIONS 1 & 2: 0
SUM OF ALL RESPONSES TO PHQ QUESTIONS 1-9: 0
1. LITTLE INTEREST OR PLEASURE IN DOING THINGS: 0
SUM OF ALL RESPONSES TO PHQ QUESTIONS 1-9: 0

## 2020-07-23 NOTE — PROGRESS NOTES
[facility]  [phone]  Fax: [phone]   Pre-operative History and Physical      DIAGNOSIS:  Osteoarthritis of right hip    PROCEDURE:  Right Total Hip Arthroplasty Anterior      History Obtained From:  patient    HISTORY OF PRESENT ILLNESS:    The patient is a 64 y.o. male with significant past medical history of osteoarthritis of right hip. I am seeing this patient for preop consultation for Dr. Liz Valle       Past Medical History:   Diagnosis Date    Antiphospholipid syndrome ([facility] [address]     Blood clotting disorder ([facility] [address]     Coronary artery disease 4/23/2014    Dr. Tai Silverman hematology and Dr. Iza Guillen Cardiology    Hyperlipidemia     DEE on CPAP     Osteoarthritis of right hip     Primary osteoarthritis of left hip 10/9/2017     Past Surgical History:   Procedure Laterality Date    CARDIAC CATHETERIZATION  2014    COLONOSCOPY      COLONOSCOPY  5/2/16    colon polyp, biopsy ascending lesion    DENTAL SURGERY  2015    FRACTURE SURGERY Right 2004    tib-fib    JOINT REPLACEMENT Left 2018    left hip    SKIN BIOPSY       Current Outpatient Medications   Medication Sig Dispense Refill    rivaroxaban (XARELTO) 20 MG TABS tablet TAKE 1 TABLET BY MOUTH EVERY DAY 90 tablet 1    atorvastatin (LIPITOR) 10 MG tablet TAKE 1 TABLET NIGHTLY AT   BEDTIME 90 tablet 3    amLODIPine (NORVASC) 5 MG tablet TAKE 1 TABLET DAILY 90 tablet 3    aspirin 81 MG chewable tablet CHEW 1 TABLET DAILY 90 tablet 3    nitroGLYCERIN (NITROSTAT) 0.4 MG SL tablet Place 1 tablet under the tongue every 5 minutes as needed for Chest pain. 25 tablet 11     No current facility-administered medications for this visit. Allergies:  Patient has no known allergies.   History of allergic reaction to anesthesia:  No     Social History     Tobacco Use   Smoking Status Current Every Day Smoker    Packs/day: 1.00    Years: 30.00    Pack years: 30.00    Types: Cigarettes    Start date: 1/1/1980   Smokeless Tobacco Never Used Tobacco Comment    20 - smokes 1/2 ppd     The patient states he drinks 0 per week. Family History   Problem Relation Age of Onset    Cancer Mother     High Cholesterol Father        REVIEW OF SYSTEMS:    CONSTITUTIONAL:  negative  EYES:  negative  HEENT:  negative  RESPIRATORY:  negative  CARDIOVASCULAR:  negative  GASTROINTESTINAL:  negative  GENITOURINARY:  negative  INTEGUMENT/BREAST:  negative  HEMATOLOGIC/LYMPHATIC:  positive for easy bruising and bleeding  ALLERGIC/IMMUNOLOGIC:  negative  ENDOCRINE:  negative  MUSCULOSKELETAL:  positive for  arthralgias  NEUROLOGICAL:  negative    PHYSICAL EXAM:      /60   Pulse 56   Temp 98 °F (36.7 °C) (Temporal)   Wt 233 lb (105.7 kg)   SpO2 97%   BMI 36.49 kg/m²     CONSTITUTIONAL:  awake, alert, cooperative, no apparent distress, and appears stated age    Eyes:  Lids and lashes normal, pupils equal, round and reactive to light, extra ocular muscles intact, sclera clear, conjunctiva normal    Head/ENT:  Normocephalic, without obvious abnormality, atramatic, sinuses nontender on palpation, external ears without lesions, oral pharynx with moist mucus membranes, tonsils without erythema or exudates, gums normal and good dentition. Neck:  Supple, symmetrical, trachea midline, no adenopathy, thyroid symmetric, not enlarged and no tenderness, skin normal, No carotid bruit    Heart:  Normal apical impulse, regular rate and rhythm, normal S1 and S2, no S3 or S4, and no murmur noted    Lungs:  No increased work of breathing, good air exchange, clear to auscultation bilaterally, no crackles or wheezing    Abdomen:  No scars, normal bowel sounds, soft, non-distended, non-tender, no masses palpated, no hepatosplenomegally    Extremities:  No clubbing, cyanosis, or edema    NEUROLOGIC:  Awake, alert, oriented to name, place and time. Cranial nerves II-XII are grossly intact. Motor is 5 out of 5 bilaterally.     EK2020: sinus bradycardia WNL- ok for surgery    Cardiac clearance from Dr Mark Suarez 7/20/2020- will attach note    ASSESSMENT AND PLAN:  1. Patient is a 64 y.o. male with above specified procedure planned on 8/4/2020 with Dr. Ananth Dickinson  at [facility].     2. Stop ASA/NSAIDS medications 7-10 days prior to procedure. Per Dr. Whatley Head note from 7/20/2020 stop xarleto 48 hours prior to surgery  3. Patient is cleared for surgery  4. Preop has been faxed to Dr. Ananth Dickinson  office    [facility], [address], [address] [place] [address]  [phone]

## 2020-09-28 ENCOUNTER — OFFICE VISIT (OUTPATIENT)
Dept: CARDIOLOGY CLINIC | Age: 60
End: 2020-09-28
Payer: COMMERCIAL

## 2020-09-28 VITALS
DIASTOLIC BLOOD PRESSURE: 62 MMHG | OXYGEN SATURATION: 98 % | HEIGHT: 67 IN | WEIGHT: 227 LBS | BODY MASS INDEX: 35.63 KG/M2 | SYSTOLIC BLOOD PRESSURE: 104 MMHG | HEART RATE: 68 BPM

## 2020-09-28 PROBLEM — I77.810 ASCENDING AORTA DILATATION (HCC): Status: ACTIVE | Noted: 2020-09-28

## 2020-09-28 PROCEDURE — 99214 OFFICE O/P EST MOD 30 MIN: CPT | Performed by: INTERNAL MEDICINE

## 2020-09-28 NOTE — PATIENT INSTRUCTIONS
Plan:  1. An echocardiogram will be ordered. This will allow review of the patient's left ventricular function and determine any valve abnormalities. The pericardium and other structures will also be evaluated. 2. Chest CT  3. I recommend that the patient continue their currently prescribed medications. Their drug modifiable risk factors appear to be well controlled. I will continue to address the need/dosing of medications in future visits. 4. Follow up with me in 1 year. Your provider has ordered testing for further evaluation. An order/prescription has been included in your paper work.  To schedule outpatient testing, contact Central Scheduling by calling [address] [place] [address] ([phone]).

## 2020-09-28 NOTE — PROGRESS NOTES
[address] [facility]   Cardiovascular Evaluation    PATIENT: Rodger Hazel  DATE: 2020  MRN: <[id number]>  CSN: [id number]  : 1960    Primary Care Doctor: Armando Martinez MD    Reason for evaluation:   Follow-up and Coronary Artery Disease      Subjective:    History of present illness on initial date of evaluation:   Rodger Hazel is a 64 y.o. patient who presents for follow up. his echocardiogram from 2019 showed an ejection fraction of 55% with a dilated aortic root. He then underwent a chest CT that showed the dilation to be 4.2cm. Today he reports that he has been feeling well from a cardiac standpoint. Roughly 2 months ago he underwent a hip replacement. He feels that he is recovering well from this. He has quit smoking twice since his last visit though is now smoking off and on again. He is motivated to quit for good. Prior to his hip surgery, he was walking 2 miles daily. He plans to restart this in the near future. Prior to his hip surgery, he was able to lose a fair amount of weigh with walking. Rosy Berman has been taking his medications as prescribed and tolerates them well. Patient Active Problem List   Diagnosis    Coronary artery disease    History of MTHFR mutation    Antiphospholipid antibody positive    Moderate obstructive sleep apnea    Obesity (BMI 30-39. 9)    Primary osteoarthritis of left hip    Tobacco abuse    Ascending aorta dilatation (HCC)         Cardiac Testing: I have reviewed the findings below. CATH: 2014   CONCLUSIONS:   1. Ectatic coronary arteries with organized laminar thrombus in the left   anterior descending. 2. Normal left ventricular function. 3. Normal hemodynamics. RECOMMENDATIONS:   1. At this point, the recommendation is to admit the patient to the hospital   at [facility]. 2. I am going to consult hematology for workup of hypercoagulable state.    3. Start aspirin and Plavix for additional antiplatelet therapy. 4. We will consider Coumadin or similar anticoagulants pending hematological   workup. In addition, I will start him on statin therapy. BYPASS:  VASCULAR:    Past Medical History:   has a past medical history of Antiphospholipid syndrome ([facility] [address] Blood clotting disorder ([address] Coronary artery disease, Hyperlipidemia, DEE on CPAP, Osteoarthritis of right hip, and Primary osteoarthritis of left hip. Surgical History:   has a past surgical history that includes skin biopsy; fracture surgery (Right, 2004); Cardiac catheterization (2014); Dental surgery (2015); Colonoscopy; Colonoscopy (5/2/16); and joint replacement (Left, 2018). Social History:   reports that he has been smoking cigarettes. He started smoking about 40 years ago. He has a 30.00 pack-year smoking history. He has never used smokeless tobacco. He reports that he does not drink alcohol or use drugs. Family History:  No evidence for sudden cardiac death or premature CAD    Home Medications:  Reviewed and are listed in nursing record. and/or listed below  Current Outpatient Medications   Medication Sig Dispense Refill    rivaroxaban (XARELTO) 20 MG TABS tablet TAKE 1 TABLET BY MOUTH EVERY DAY 90 tablet 1    atorvastatin (LIPITOR) 10 MG tablet TAKE 1 TABLET NIGHTLY AT   BEDTIME 90 tablet 3    amLODIPine (NORVASC) 5 MG tablet TAKE 1 TABLET DAILY 90 tablet 3    aspirin 81 MG chewable tablet CHEW 1 TABLET DAILY 90 tablet 3    nitroGLYCERIN (NITROSTAT) 0.4 MG SL tablet Place 1 tablet under the tongue every 5 minutes as needed for Chest pain. 25 tablet 11     No current facility-administered medications for this visit. Allergies:  Patient has no known allergies. Review of Systems:   All 14 point review of symptoms completed. Pertinent positives identified in the HPI, all other review of symptoms negative as below.     Review of Systems - History obtained from the patient  General ROS: negative for - chills, fever or night sweats  Psychological ROS: negative for - disorientation or hallucinations  Ophthalmic ROS: negative for - dry eyes, eye pain or loss of vision  ENT ROS: negative for - nasal discharge or sore throat  Allergy and Immunology ROS: negative for - hives or itchy/watery eyes  Hematological and Lymphatic ROS: negative for - jaundice or night sweats  Endocrine ROS: negative for - mood swings or temperature intolerance  Breast ROS: deferred  Respiratory ROS: negative for - hemoptysis or stridor  Cardiovascular ROS: negative for - chest pain, dyspnea on exertion or palpitations  Gastrointestinal ROS: no abdominal pain, change in bowel habits, or black or bloody stools  Genito-Urinary ROS: no dysuria, trouble voiding, or hematuria  Musculoskeletal ROS: negative for - gait disturbance, joint pain or joint stiffness  Neurological ROS: negative for - seizures or speech problems  Dermatological ROS: negative for - rash or skin lesion changes        Physical Examination:    Vitals:    09/28/20 1352   BP: 104/62   Pulse:    SpO2:     Weight: 227 lb (103 kg)     Wt Readings from Last 3 Encounters:   09/28/20 227 lb (103 kg)   07/23/20 233 lb (105.7 kg)   09/10/19 238 lb (108 kg)       General Appearance:  Alert, cooperative, no distress, appears stated age   Head:  Normocephalic, without obvious abnormality, atraumatic   Eyes:  PERRL, conjunctiva/corneas clear       Nose: Nares normal, no drainage or sinus tenderness   Throat: Lips, mucosa, and tongue normal   Neck: Supple, symmetrical, trachea midline, no adenopathy, thyroid: not enlarged, symmetric, no tenderness/mass/nodules, no carotid bruit or JVD       Lungs:   Coarse breath sounds, respirations unlabored   Chest Wall:  No tenderness or deformity   Heart:  Regular rhythm and normal rate; S1, S2 are normal; no murmur noted; no rub or gallop   Abdomen:   Soft, non-tender, bowel sounds active all four quadrants,  no masses, no organomegaly           Extremities: Extremities

## 2020-09-28 NOTE — LETTER
[address] [facility]   Cardiovascular Evaluation    PATIENT: Vasiliy Tapia  DATE: 2020  MRN: <[id number]>  CSN: [id number]  : 1960    Primary Care Doctor: Zaire Lyons MD    Reason for evaluation:   Follow-up and Coronary Artery Disease      Subjective:    History of present illness on initial date of evaluation:   Vasiliy Tapia is a 64 y.o. patient who presents for follow up. his echocardiogram from 2019 showed an ejection fraction of 55% with a dilated aortic root. He then underwent a chest CT that showed the dilation to be 4.2cm. Today he reports that he has been feeling well from a cardiac standpoint. Roughly 2 months ago he underwent a hip replacement. He feels that he is recovering well from this. He has quit smoking twice since his last visit though is now smoking off and on again. He is motivated to quit for good. Prior to his hip surgery, he was walking 2 miles daily. He plans to restart this in the near future. Prior to his hip surgery, he was able to lose a fair amount of weigh with walking. Karli Simpson has been taking his medications as prescribed and tolerates them well. Patient Active Problem List   Diagnosis    Coronary artery disease    History of MTHFR mutation    Antiphospholipid antibody positive    Moderate obstructive sleep apnea    Obesity (BMI 30-39. 9)    Primary osteoarthritis of left hip    Tobacco abuse    Ascending aorta dilatation (HCC)         Cardiac Testing: I have reviewed the findings below. CATH: 2014   CONCLUSIONS:   1. Ectatic coronary arteries with organized laminar thrombus in the left   anterior descending. 2. Normal left ventricular function. 3. Normal hemodynamics. RECOMMENDATIONS:   1. At this point, the recommendation is to admit the patient to the hospital   at [facility]. 2. I am going to consult hematology for workup of hypercoagulable state. 3. Start aspirin and Plavix for additional antiplatelet therapy. 4. We will consider Coumadin or similar anticoagulants pending hematological   workup. In addition, I will start him on statin therapy. BYPASS:  VASCULAR:    Past Medical History:   has a past medical history of Antiphospholipid syndrome ([facility] [address] Blood clotting disorder ([facility] [address] Coronary artery disease, Hyperlipidemia, DEE on CPAP, Osteoarthritis of right hip, and Primary osteoarthritis of left hip. Surgical History:   has a past surgical history that includes skin biopsy; fracture surgery (Right, 2004); Cardiac catheterization (2014); Dental surgery (2015); Colonoscopy; Colonoscopy (5/2/16); and joint replacement (Left, 2018). Social History:   reports that he has been smoking cigarettes. He started smoking about 40 years ago. He has a 30.00 pack-year smoking history. He has never used smokeless tobacco. He reports that he does not drink alcohol or use drugs. Family History:  No evidence for sudden cardiac death or premature CAD    Home Medications:  Reviewed and are listed in nursing record. and/or listed below  Current Outpatient Medications   Medication Sig Dispense Refill    rivaroxaban (XARELTO) 20 MG TABS tablet TAKE 1 TABLET BY MOUTH EVERY DAY 90 tablet 1    atorvastatin (LIPITOR) 10 MG tablet TAKE 1 TABLET NIGHTLY AT   BEDTIME 90 tablet 3    amLODIPine (NORVASC) 5 MG tablet TAKE 1 TABLET DAILY 90 tablet 3    aspirin 81 MG chewable tablet CHEW 1 TABLET DAILY 90 tablet 3    nitroGLYCERIN (NITROSTAT) 0.4 MG SL tablet Place 1 tablet under the tongue every 5 minutes as needed for Chest pain. 25 tablet 11     No current facility-administered medications for this visit. Allergies:  Patient has no known allergies. Review of Systems:   All 14 point review of symptoms completed. Pertinent positives identified in the HPI, all other review of symptoms negative as below.     Review of Systems - History obtained from the patient masses, no organomegaly           Extremities: Extremities normal, atraumatic, no cyanosis or edema   Pulses: 2+ and symmetric   Skin: Skin color, texture, turgor normal, no rashes or lesions   Pysch: Normal mood and affect   Neurologic: Normal gross motor and sensory exam.         Labs  CBC:   Lab Results   Component Value Date    WBC 7.7 09/16/2019    RBC 5.03 09/16/2019    RBC 4.97 04/13/2017    HGB 15.1 09/16/2019    HCT 43.5 09/16/2019    MCV 86.3 09/16/2019    RDW 14.0 09/16/2019     09/16/2019     CMP:    Lab Results   Component Value Date     09/16/2019    K 4.0 09/16/2019     09/16/2019    CO2 21 09/16/2019    BUN 18 09/16/2019    CREATININE 0.7 09/16/2019    GFRAA >60 09/16/2019    GFRAA >60 08/04/2011    AGRATIO 1.7 09/10/2017    LABGLOM >60 09/16/2019    GLUCOSE 101 09/16/2019    GLUCOSE 103 04/02/2015    PROT 6.9 09/16/2019    PROT 7.4 04/02/2015    CALCIUM 9.2 09/16/2019    BILITOT 0.5 09/16/2019    ALKPHOS 74 09/16/2019    AST 20 09/16/2019    ALT 29 09/16/2019     PT/INR:    No components found for: PTPATIENT,  PTINR  Lab Results   Component Value Date    TROPONINI <0.01 05/10/2019     No components found for: CHLPL  Lab Results   Component Value Date    TRIG 106 09/16/2019    TRIG 124 10/13/2016    TRIG 114 09/30/2015     Lab Results   Component Value Date    HDL 32 (L) 09/16/2019    HDL 29 (L) 10/13/2016    HDL 37 (L) 09/30/2015     Lab Results   Component Value Date    LDLCALC 47 09/16/2019    LDLCALC 66 09/30/2015    LDLCALC 86 04/07/2014     Lab Results   Component Value Date    LABVLDL 21 09/16/2019    LABVLDL 23 09/30/2015    LABVLDL 33 04/07/2014     Lab Results   Component Value Date    ALT 29 09/16/2019    AST 20 09/16/2019    ALKPHOS 74 09/16/2019    BILITOT 0.5 09/16/2019         Assessment:  64 y.o. patient with:  1. Coronary artery diease    ~laminar thrombus within the LAD   ~stable  2. Obesity  3. Tobacco abuse  4.  Ascending aorta  enlargement    Plan: 1. An echocardiogram will be ordered. This will allow review of the patient's left ventricular function and determine any valve abnormalities. The pericardium and other structures will also be evaluated. 2. Chest CT to re-assess ascending aortic size. 3. I recommend that the patient continue their currently prescribed medications. Their drug modifiable risk factors appear to be well controlled. I will continue to address the need/dosing of medications in future visits. 4. Follow up with me in 1 year. This note was scribed in the presence of Dr. Andreia De MD by Richy Sidhu RN.       I, Dr. Andreia De, personally performed the services described in this documentation, as scribed by the above signed scribe in my presence. It is both accurate and complete to my knowledge. I agree with the details independently gathered by the clinical support staff, while the remaining scribed note accurately describes my personal service to the patient. All questions and concerns were addressed to the patient/family. Alternatives to my treatment were discussed. The note was completed using EMR. Every effort was made to ensure accuracy; however, inadvertent computerized transcription errors may be present.     Andreia De MD, [address], [place], [place]  [phone] [place] office  [phone] [facility]  9/28/2020  2:27 PM

## 2020-10-13 ENCOUNTER — HOSPITAL ENCOUNTER (OUTPATIENT)
Dept: NON INVASIVE DIAGNOSTICS | Age: 60
Discharge: HOME | End: 2020-10-13
Payer: COMMERCIAL

## 2020-10-13 ENCOUNTER — HOSPITAL ENCOUNTER (OUTPATIENT)
Dept: CT IMAGING | Age: 60
Discharge: HOME | End: 2020-10-13
Payer: COMMERCIAL

## 2020-10-13 LAB
LV EF: 55 %
LVEF MODALITY: NORMAL

## 2020-10-13 PROCEDURE — 6360000004 HC RX CONTRAST MEDICATION: Performed by: INTERNAL MEDICINE

## 2020-10-13 PROCEDURE — 74174 CTA ABD&PLVS W/CONTRAST: CPT

## 2020-10-13 PROCEDURE — 93306 TTE W/DOPPLER COMPLETE: CPT

## 2020-10-13 RX ADMIN — IOPAMIDOL 100 ML: 755 INJECTION, SOLUTION INTRAVENOUS at 10:38

## 2020-10-14 ENCOUNTER — TELEPHONE (OUTPATIENT)
Dept: CARDIOLOGY CLINIC | Age: 60
End: 2020-10-14

## 2020-10-14 NOTE — TELEPHONE ENCOUNTER
Created telephone encounter. Spoke with Bill relayed message per VSP regarding CTA and echo. Pt verbalized understanding.

## 2020-10-14 NOTE — TELEPHONE ENCOUNTER
----- Message from Edie Cameron MD sent at 10/13/2020  5:00 PM EDT -----  Call  Stable mildly dilated aorta      The test does NOT show any major change from prior testing. Please inform the patient of the results.

## 2020-10-26 RX ORDER — AMLODIPINE BESYLATE 5 MG/1
TABLET ORAL
Qty: 90 TABLET | Refills: 3 | Status: SHIPPED | OUTPATIENT
Start: 2020-10-26 | End: 2021-09-14

## 2020-11-09 RX ORDER — ATORVASTATIN CALCIUM 10 MG/1
TABLET, FILM COATED ORAL
Qty: 90 TABLET | Refills: 3 | Status: SHIPPED | OUTPATIENT
Start: 2020-11-09 | End: 2021-09-14

## 2020-12-07 ENCOUNTER — TELEPHONE (OUTPATIENT)
Dept: FAMILY MEDICINE CLINIC | Age: 60
End: 2020-12-07

## 2020-12-07 NOTE — TELEPHONE ENCOUNTER
----- Message from Johnnie Araujo sent at 12/7/2020 10:49 AM EST -----  Subject: Appointment Request    Reason for Call: Routine Back/Neck Pain    QUESTIONS  Type of Appointment? Established Patient  Reason for appointment request? No appointments available during search  Additional Information for Provider? Patient has screened green   patient is having some lower back issues & has a wart on his foot that   needs looked at.   ---------------------------------------------------------------------------  --------------  [id number]  What is the best way for the office to contact you? OK to leave message on   voicemail  Preferred Call Back Phone Number? [phone]  ---------------------------------------------------------------------------  --------------  SCRIPT ANSWERS  Relationship to Patient? Self  Appointment reason? Symptomatic  Select script based on patient symptoms? Adult Back or Neck Pain [Slipped   disc   Herniated disc   sciatica]  Did you have an injury or trauma within the past 3 days? No  Are you having numbness   tingling   or weakness in your arms and/or legs with this pain? No  Are you having new problems with your bowel or bladder control? No  Are you having fevers (100.4)   chills   or sweats? No  Did your pain begin within the past 14 days? No  Is your pain affecting your daily activities or employment? No  (Is the patient requesting to be seen urgently for their symptoms?)? No  Have you previously seen a provider for this? No  Have you been diagnosed with   tested for   or told that you are suspected of having COVID-19 (Coronavirus)? No  Have you had a fever or taken medication to treat a fever within the past   3 days? No  Have you had a cough   shortness of breath or flu-like symptoms within the past 3 days? No  Do you currently have flu-like symptoms including fever or chills   cough   shortness of breath   or difficulty breathing   or new loss of taste or smell?  No  (Service Expert  click yes

## 2020-12-15 ENCOUNTER — PROCEDURE VISIT (OUTPATIENT)
Dept: FAMILY MEDICINE CLINIC | Age: 60
End: 2020-12-15
Payer: COMMERCIAL

## 2020-12-15 VITALS
OXYGEN SATURATION: 98 % | DIASTOLIC BLOOD PRESSURE: 82 MMHG | SYSTOLIC BLOOD PRESSURE: 144 MMHG | TEMPERATURE: 97.6 F | BODY MASS INDEX: 37.28 KG/M2 | HEART RATE: 64 BPM | WEIGHT: 238 LBS

## 2020-12-15 PROCEDURE — 90686 IIV4 VACC NO PRSV 0.5 ML IM: CPT | Performed by: FAMILY MEDICINE

## 2020-12-15 PROCEDURE — 90471 IMMUNIZATION ADMIN: CPT | Performed by: FAMILY MEDICINE

## 2020-12-15 NOTE — PATIENT INSTRUCTIONS
Patient Education        Sacroiliac Pain: Exercises  Introduction  Here are some examples of exercises for you to try. The exercises may be suggested for a condition or for rehabilitation. Start each exercise slowly. Ease off the exercises if you start to have pain. You will be told when to start these exercises and which ones will work best for you. How to do the exercises  Knee-to-chest stretch   1. Do not do the knee-to-chest exercise if it causes or increases back or leg pain. 2. Lie on your back with your knees bent and your feet flat on the floor. You can put a small pillow under your head and neck if it is more comfortable. 3. Grasp your hands under one knee and bring the knee to your chest, keeping the other foot flat on the floor. 4. Keep your lower back pressed to the floor. Hold for at least 15 to 30 seconds. 5. Relax and lower the knee to the starting position. Repeat with the other leg. 6. Repeat 2 to 4 times with each leg. 7. To get more stretch, keep your other leg flat on the floor while pulling your knee to your chest.    Bridging   1. Lie on your back with both knees bent. Your knees should be bent about 90 degrees. 2. Tighten your belly muscles by pulling in your belly button toward your spine. Then push your feet into the floor, squeeze your buttocks, and lift your hips off the floor until your shoulders, hips, and knees are all in a straight line. 3. Hold for about 6 seconds as you continue to breathe normally, and then slowly lower your hips back down to the floor and rest for up to 10 seconds. 4. Repeat 8 to 12 times. Hip extension   1. Get down on your hands and knees on the floor. 2. Keeping your back and neck straight, lift one leg straight out behind you. When you lift your leg, keep your hips level. Don't let your back twist, and don't let your hip drop toward the floor. 3. Hold for 6 seconds. Repeat 8 to 12 times with each leg. 4. If you feel steady and strong when you do this exercise, you can make it more difficult. To do this, when you lift your leg, also lift the opposite arm straight out in front of you. For example, lift the left leg and the right arm at the same time. (This is sometimes called the \"bird dog exercise. \") Hold for 6 seconds, and repeat 8 to 12 times on each side. Clamshell   1. Lie on your side with a pillow under your head. Keep your feet and knees together and your knees bent. 2. Raise your top knee, but keep your feet together. Do not let your hips roll back. Your legs should open up like a clamshell. 3. Hold for 6 seconds. 4. Slowly lower your knee back down. Rest for 10 seconds. 5. Repeat 8 to 12 times. 6. Switch to your other side and repeat steps 1 through 5. Hamstring wall stretch   1. Lie on your back in a doorway, with one leg through the open door. 2. Slide your affected leg up the wall to straighten your knee. You should feel a gentle stretch down the back of your leg. 3. Hold the stretch for at least 1 minute to begin. Then try to lengthen the time you hold the stretch to as long as 6 minutes. 4. Switch legs, and repeat steps 1 through 3.  5. Repeat 2 to 4 times. 6. If you do not have a place to do this exercise in a doorway, there is another way to do it:  7. Lie on your back, and bend one knee. 8. Loop a towel under the ball and toes of that foot, and hold the ends of the towel in your hands. 9. Straighten your knee, and slowly pull back on the towel. You should feel a gentle stretch down the back of your leg. 10. Switch legs, and repeat steps 1 through 3.  11. Repeat 2 to 4 times. 1. Do not arch your back. 2. Do not bend either knee. 3. Keep one heel touching the floor and the other heel touching the wall. Do not point your toes. Lower abdominal strengthening   1. Lie on your back with your knees bent and your feet flat on the floor. 2. Tighten your belly muscles by pulling your belly button in toward your spine. 3. Lift one foot off the floor and bring your knee toward your chest, so that your knee is straight above your hip and your leg is bent like the letter \"L. \"  4. Lift the other knee up to the same position. 5. Lower one leg at a time to the starting position. 6. Keep alternating legs until you have lifted each leg 8 to 12 times. 7. Be sure to keep your belly muscles tight and your back still as you are moving your legs. Be sure to breathe normally. Piriformis stretch   1. Lie on your back with your legs straight. 2. Lift your affected leg, and bend your knee. With your opposite hand, reach across your body, and then gently pull your knee toward your opposite shoulder. 3. Hold the stretch for 15 to 30 seconds. 4. Switch legs and repeat steps 1 through 3.  5. Repeat 2 to 4 times. Follow-up care is a key part of your treatment and safety. Be sure to make and go to all appointments, and call your doctor if you are having problems. It's also a good idea to know your test results and keep a list of the medicines you take. Where can you learn more? Go to [email or web address] [email or web address] org and sign in to your [employer] account. Enter A641 in the [facility] box to learn more about \"Sacroiliac Pain: Exercises. \"     If you do not have an account, please click on the \"Sign Up Now\" link. Current as of: March 2, 2020               Content Version: 12.6  © 3550-7143 [employer]. Care instructions adapted under license by [facility] ([facility]). If you have questions about a medical condition or this instruction, always ask your healthcare professional. [place] [address] any warranty or liability for your use of this information.

## 2020-12-15 NOTE — PROGRESS NOTES
Vaccine Information Sheet, \"Influenza - Inactivated\"  given to Chiki Martínez, or parent/legal guardian of  Chiki Martínez and verbalized understanding. Patient responses:    Have you ever had a reaction to a flu vaccine? No  Do you have any current illness? No  Have you ever had [place] Syndrome? No  Do you have a serious allergy to any of the follow: Neomycin, Polymyxin, Thimerosal, eggs or egg products? No    Flu vaccine given per order. Please see immunization tab. Risks and benefits explained. Current VIS given.

## 2020-12-25 NOTE — PROGRESS NOTES
Skin Tag Removal Procedure Note    Pre-operative Diagnosis: Classic skin tags (acrochordon)    Post-operative Diagnosis: Classic skin tags (acrochordon)    Locations:left groin, right axilla    Indications: Irritated, get stuck on clothes, bleeding    Anesthesia: Lidocaine 1% with epinephrine without added sodium bicarbonate    Procedure Details   The risks (including bleeding and infection) and benefits of the procedure and Verbal informed consent obtained. Using sterile iris scissors, multiple skin tags were snipped off at their bases after cleansing with Betadine. Bleeding was controlled by pressure. Findings:  Pathognomonic benign lesions  not sent for pathological exam.    Condition:  Stable    Complications:  none. Plan:  1. Instructed to keep the wounds dry and covered for 24-48h and clean thereafter. 2. Warning signs of infection were reviewed. 3. Recommended that the patient use OTC acetaminophen as needed for pain. 4. Return as needed.

## 2021-05-05 ENCOUNTER — TELEPHONE (OUTPATIENT)
Dept: PULMONOLOGY | Age: 61
End: 2021-05-05

## 2021-05-05 ENCOUNTER — VIRTUAL VISIT (OUTPATIENT)
Dept: PULMONOLOGY | Age: 61
End: 2021-05-05
Payer: COMMERCIAL

## 2021-05-05 DIAGNOSIS — G47.33 MODERATE OBSTRUCTIVE SLEEP APNEA: Primary | ICD-10-CM

## 2021-05-05 DIAGNOSIS — Z71.89 CPAP USE COUNSELING: ICD-10-CM

## 2021-05-05 DIAGNOSIS — E66.9 OBESITY (BMI 30-39.9): ICD-10-CM

## 2021-05-05 PROCEDURE — 99213 OFFICE O/P EST LOW 20 MIN: CPT | Performed by: NURSE PRACTITIONER

## 2021-05-05 ASSESSMENT — SLEEP AND FATIGUE QUESTIONNAIRES
HOW LIKELY ARE YOU TO NOD OFF OR FALL ASLEEP WHEN YOU ARE A PASSENGER IN A CAR FOR AN HOUR WITHOUT A BREAK: 1
HOW LIKELY ARE YOU TO NOD OFF OR FALL ASLEEP WHILE SITTING AND READING: 1
HOW LIKELY ARE YOU TO NOD OFF OR FALL ASLEEP WHILE LYING DOWN TO REST IN THE AFTERNOON WHEN CIRCUMSTANCES PERMIT: 2
HOW LIKELY ARE YOU TO NOD OFF OR FALL ASLEEP WHILE WATCHING TV: 1

## 2021-05-05 NOTE — PATIENT INSTRUCTIONS
Sleep Hygiene. .. Tips for better sleep. .. Avoid naps. This will ensure you are sleepy at bedtime. If you have to take a nap, sleep less than 1 hour, before 3 pm.  Sleep only when sleepy; this reduces the time you are awake in bed. Regular exercise is recommended to help you deepen your sleep, but not within 4-6 hours of your bedtime. Timing of exercise is important, aim to exercise early in the morning or early afternoon. A light snack may help you fall asleep. Warm milk and foods high in the amino acid tryptophan, such as bananas, may help you to sleep  Be sure to avoid heavy, spicy or sugary foods 4-6 hours before bedtime and avoid at snack time. Stay away from stimulants such as caffeine and nicotine for at least 4-6 hours before bed. Stimulants can interfere with your ability to fall asleep. Caffeine is found in tea, cola, coffee, cocoa and chocolate and is best avoided at bedtime. Nicotine is found in tobacco products. Avoid alcohol 4-6 hours before bedtime. Alcohol has an immediate sleep-inducing effect, after a few hours when alcohol levels fall there is a stimulant or wake-up effect and will cause fragmented sleep. Sleep rituals are important. Give your body clues it is time to slow down and sleep. Examples include; yoga, deep breathing, listen to relaxing music, a hot bath or a few minutes of reading. Have a fixed bedtime and awakening time, Even on weekends! You will feel better keeping a regular sleep cycle, even if you are retired or not working. Get into your favorite sleep position. If not asleep in 30 minutes, get up and do something boring until you feel sleepy. Remember not to expose yourself to bright lights such as TV, phone or tablet screens. Only use your bed for sleeping. Do not use your bed as an office, workroom or recreation room. Use comfortable bedding. Uncomfortable bedding can prevent good sleep. Ensure your bedroom is quiet and comfortable.  A cooler room along with recommended cleaning schedule. - Do change your disposable filter frequently. Adapted From: 51edjPDream.scoo mobility/cleaning. shtm.   These are general suggestions for all models please follow specific s recommendations and specific instructions

## 2021-05-05 NOTE — PROGRESS NOTES
nitroGLYCERIN (NITROSTAT) 0.4 MG SL tablet, Place 1 tablet under the tongue every 5 minutes as needed for Chest pain. (Patient not taking: Reported on 12/15/2020), Disp: 25 tablet, Rfl: 11      Objective:   PHYSICAL EXAM:    There were no vitals taken for this visit.' on RA  Exam:  Gen: No acute distress, does not appear to be in pain. Appears well developed and nourished. HENT: Head is normocephalic and atraumatic. Normal appearing nose. External Ears normal.   Neck: No visualized mass. Trachea is midline   Eyes: EOM intact. No visible discharge. Resp:No visualized signs of difficulty breathing or respiratory distress, speaking in full sentences. Respiratory effort normal.  Neuro: Awake. Alert. Able to follow commands. No facial asymmetry. Skin: No significant lesions or discoloration noted on facial skin    Musculoskeletal: Normal range of motion of the neck. Psych: Oriented x 3. No anxiety. Normal affect. DATA:   PSG  1/4/16 AHI 22. and desaturation to 69%. PLMS index 27.3/hr with arousal index 26   CPAP 1/31/16 showed AHI down to 3.9 with CPAP, with PLMS index 43.8. CPAP compliance data:  Compliance download report from 2/18/17 to 3/19/17 reviewed today by me and showed patient is using machine 4:39 hrs/night with 53% compliance and AHI 0.5 within this time frame. 16/30days with greater than 4 hours of machine use. 90% pressure 11.7 cm H20    Compliance download report from 2/21/18 to 3/22/18 reviewed today by me and showed patient is using machine 4:13 hrs/night with 50% compliance and AHI 0.5 within this time frame. 15/30days with greater than 4 hours of machine use. 90% pressure 11.8 cm H20 on auto CPAP 9-13 cm H2O    Compliance download report from 3/31/19 to 4/29/19  showed patient is using machine 5:20 hrs/night with 57% compliance and AHI 0.8 within this time frame. 17/30days with greater than 4 hours of machine use.   90% pressure 12.5 cm H20 on auto CPAP 9-13 cm H2O     Compliance download report from 4/1/20 to 4/30/20 reviewed today by me and showed patient is using machine 5:37 hrs/night with 63% compliance and AHI 0.6 within this time frame. 19/30days with greater than 4 hours of machine use. 30/30 days with any CPAP use. 90% pressure 12.4 cm H20 on auto CPAP 9-13    Compliance download report from 4/4/21 to 5/3/21 reviewed today by me and showed patient is using machine 6:18 hrs/night with 87% compliance and AHI 0.6 within this time frame. 26/30days with greater than 4 hours of machine use. 90% pressure 12.4 cm H20 on auto CPAP 9-13 cm H2O     Assessment:       · Moderate DEE. Auto CPAP 9-13 cm H20. Optimal compliance and efficacy on review today. · Witnessed apnea- resolved with CPAP  · Hypersomnia - improved  · History of Coronary artery diease  · Obesity        Plan:       · Continue Auto CPAP 9-13 cm H2O  · Advised to use CPAP 6-8 hrs at night and during naps. · Again discussed good sleep habits   · Replacement of mask, tubing, head straps every 3-6 months or sooner if damaged. · Patient instructed to contact [email or web address] company for any mask, tubing or machine trouble shooting if problems arise. · Sleep hygiene  · Avoid sedatives, alcohol and caffeinated drinks at bed time. · No driving motorized vehicles or operating heavy machinery while fatigue, drowsy or sleepy. · Weight loss is also recommended as a long-term intervention. · Complications of DEE if not treated were discussed with patient patient to include systemic hypertension, pulmonary hypertension, cardiovascular morbidities, car accidents and all cause mortality. · Patient education  regarding sleep tips and CPAP cleaning recommendations       · Follow up 1 year, sooner if needed    Consent for telehealth visit was obtained and is noted in chart      C/ [address]. Quincy Mckeon is a 63 y.o. male being evaluated by a Virtual Visit (video visit) encounter to address concerns as mentioned above. A caregiver was present when appropriate. Due to this being a TeleHealth encounter (During ZEGAU-97 public health emergency), evaluation of the following organ systems was limited: Vitals/Constitutional/EENT/Resp/CV/GI//MS/Neuro/Skin/Heme-Lymph-Imm. Pursuant to the emergency declaration under the [address] [place] [address], [address] [place] [address] authority and the [employer] Act, this Virtual Visit was conducted with patient's (and/or legal guardian's) consent, to reduce the patient's risk of exposure to COVID-19 and provide necessary medical care. The patient (and/or legal guardian) has also been advised to contact this office for worsening conditions or problems, and seek emergency medical treatment and/or call 911 if deemed necessary. Patient identification was verified at the start of the visit: Yes    Total time spent for this encounter: Not billed by time    Services were provided through a video synchronous discussion virtually to substitute for in-person clinic visit. Patient and provider were located at their individual homes. --GERARD Lau CNP on 5/5/2021 at 3:28 PM    An electronic signature was used to authenticate this note.

## 2021-05-05 NOTE — TELEPHONE ENCOUNTER
Within this Telehealth Consent, the terms you and yours refer to the person using the Telehealth Service (Service), or in the case of a use of the Service by or on behalf of a minor, you and yours refer to and include (i) the parent or legal guardian who provides consent to the use of the Service by such minor or uses the Service on behalf of such minor, and (ii) the minor for whom consent is being provided or on whose behalf the Service is being utilized. When using Service, you will be consulting with your health care providers via the use of Telehealth.   Telehealth involves the delivery of healthcare services using electronic communications, information technology or other means between a healthcare provider and a patient who are not in the same physical location. Telehealth may be used for diagnosis, treatment, follow-up and/or patient education, and may include, but is not limited to, one or more of the following:    Electronic transmission of medical records, photo images, personal health information or other data between a patient and a healthcare provider    Interactions between a patient and healthcare provider via audio, video and/or data communications    Use of output data from medical devices, sound and video files    Anticipated Benefits   The use of Telehealth by your Provider(s) through the Service may have the following possible benefits:    Making it easier and more efficient for you to access medical care and treatment for the conditions treated by such Provider(s) utilizing the Service    Allowing you to obtain medical care and treatment by Provider(s) at times that are convenient for you    Enabling you to interact with Provider(s) without the necessity of an in-office appointment     Possible Risks   While the use of Telehealth can provide potential benefits for you, there are also potential risks associated with the use of Telehealth.  These risks include, but may not be limited to the following:    Your Provider(s) may not able to provide medical treatment for your particular condition and you may be required to seek alternative healthcare or emergency care services.  The electronic systems or other security protocols or safeguards used in the Service could fail, causing a breach of privacy of your medical or other information.  Given regulatory requirements in certain jurisdictions, your Provider(s) diagnosis and/or treatment options, especially pertaining to certain prescriptions, may be limited. Acceptance   1. You understand that Services will be provided via Telehealth. This process involves the use of HIPAA compliant and secure, real-time audio-visual interfacing with a qualified and appropriately trained provider located at [facility]. 2. You understand that, under no circumstances, will this session be recorded. 3. You understand that the Provider(s) at [facility] and other clinical participants will be party to the information obtained during the Telehealth session in accordance with best medical practices. 4. You understand that the information obtained during the Telehealth session will be used to help determine the most appropriate treatment options. 5. You understand that You have the right to revoke this consent at any point in time. 6. You understand that Telehealth is voluntary, and that continued treatment is not dependent upon consent. 7. You understand that, in the event of non-consent to Telehealth services and/or technical difficulties, you will obtain services as typically provided in the absence of Telehealth technology. 8. You understand that this consent will be kept in Your medical record. 9. No potential benefits from the use of Telehealth or specific results can be guaranteed. Your condition may not be cured or improved and, in some cases, may get worse.    10. There are limitations in the provision of medical care and treatment via Telehealth and the Service and you may not be able to receive diagnosis and/or treatment through the Service for every condition for which you seek diagnosis and/or treatment. 11. There are potential risks to the use of Telehealth, including but not limited to the risks described in this Telehealth Consent. 12. Your Provider(s) have discussed the use of Telehealth and the Service with you, including the benefits and risks of such and you have provided oral consent to your Provider(s) for the use of Telehealth and the Service. 15. You understand that it is your duty to provide your Provider(s) truthful, accurate and complete information, including all relevant information regarding care that you may have received or may be receiving from other healthcare providers outside of the Service. 14. You understand that each of your Provider(s) may determine in his or sole discretion that your condition is not suitable for diagnosis and/or treatment using the Service, and that you may need to seek medical care and treatment a specialist or other healthcare provider, outside of the Service. 15. You understand that you are fully responsible for payment for all services provided by Provider(s) or through use of the Service and that you may not be able to use third-party insurance. 16. You represent that (a) you have read this Telehealth Consent carefully, (b) you understand the risks and benefits of the Service and the use of Telehealth in the medical care and treatment provided to you by Provider(s) using the Service, and (c) you have the legal capacity and authority to provide this consent for yourself and/or the minor for which you are consenting under applicable federal and state laws, including laws relating to the age of [de-identified] and/or parental/guardian consent.    17. You give your informed consent to the use of Telehealth by Provider(s) using the Service under the terms described in the Terms of Service and this Telehealth Consent. The patient was read the following statement and has consented to the visit as of 5/5/21. The patient has been scheduled for their first telehealth visit on 5/5/21 with Rhoda Amaral CNP.

## 2021-05-24 ENCOUNTER — OFFICE VISIT (OUTPATIENT)
Dept: FAMILY MEDICINE CLINIC | Age: 61
End: 2021-05-24
Payer: COMMERCIAL

## 2021-05-24 ENCOUNTER — NURSE TRIAGE (OUTPATIENT)
Dept: OTHER | Facility: CLINIC | Age: 61
End: 2021-05-24

## 2021-05-24 VITALS
HEART RATE: 68 BPM | SYSTOLIC BLOOD PRESSURE: 126 MMHG | BODY MASS INDEX: 37.75 KG/M2 | DIASTOLIC BLOOD PRESSURE: 80 MMHG | OXYGEN SATURATION: 96 % | WEIGHT: 241 LBS

## 2021-05-24 DIAGNOSIS — R10.9 FLANK PAIN: Primary | ICD-10-CM

## 2021-05-24 DIAGNOSIS — R31.9 HEMATURIA, UNSPECIFIED TYPE: ICD-10-CM

## 2021-05-24 LAB
BILIRUBIN, POC: NEGATIVE
BLOOD URINE, POC: NORMAL
CLARITY, POC: NORMAL
COLOR, POC: NORMAL
GLUCOSE URINE, POC: NEGATIVE
KETONES, POC: NEGATIVE
LEUKOCYTE EST, POC: NEGATIVE
NITRITE, POC: NEGATIVE
PH, POC: 6.5
PROTEIN, POC: NEGATIVE
SPECIFIC GRAVITY, POC: 1.01
UROBILINOGEN, POC: NORMAL

## 2021-05-24 PROCEDURE — 81002 URINALYSIS NONAUTO W/O SCOPE: CPT | Performed by: NURSE PRACTITIONER

## 2021-05-24 PROCEDURE — 99213 OFFICE O/P EST LOW 20 MIN: CPT | Performed by: NURSE PRACTITIONER

## 2021-05-24 RX ORDER — CIPROFLOXACIN 250 MG/1
250 TABLET, FILM COATED ORAL 2 TIMES DAILY
Qty: 6 TABLET | Refills: 0 | Status: SHIPPED | OUTPATIENT
Start: 2021-05-24 | End: 2021-05-27

## 2021-05-24 ASSESSMENT — ENCOUNTER SYMPTOMS
SHORTNESS OF BREATH: 0
WHEEZING: 0

## 2021-05-25 LAB — URINE CULTURE, ROUTINE: NORMAL

## 2021-05-26 DIAGNOSIS — R31.9 HEMATURIA, UNSPECIFIED TYPE: Primary | ICD-10-CM

## 2021-06-09 ENCOUNTER — TELEPHONE (OUTPATIENT)
Dept: CARDIOLOGY CLINIC | Age: 61
End: 2021-06-09

## 2021-06-09 NOTE — TELEPHONE ENCOUNTER
Dr. Shaq henry requests cardiac clearance for upcoming cystoscopy on 7/6/21. MAC anesthesia will be used. Need clearance instructions for ASA 81 mg daily and Xarelto 20 mg daily. Please advise one clearance and blood thinner instructions. Thanks.      Fax letter [phone]

## 2021-06-29 ENCOUNTER — OFFICE VISIT (OUTPATIENT)
Dept: FAMILY MEDICINE CLINIC | Age: 61
End: 2021-06-29
Payer: COMMERCIAL

## 2021-06-29 VITALS
BODY MASS INDEX: 35.87 KG/M2 | HEIGHT: 69 IN | SYSTOLIC BLOOD PRESSURE: 124 MMHG | DIASTOLIC BLOOD PRESSURE: 78 MMHG | TEMPERATURE: 97.4 F | OXYGEN SATURATION: 95 % | WEIGHT: 242.2 LBS | HEART RATE: 74 BPM

## 2021-06-29 DIAGNOSIS — R31.9 HEMATURIA, UNSPECIFIED TYPE: ICD-10-CM

## 2021-06-29 DIAGNOSIS — Z01.818 PRE-OP EXAM: Primary | ICD-10-CM

## 2021-06-29 PROCEDURE — 99242 OFF/OP CONSLTJ NEW/EST SF 20: CPT | Performed by: NURSE PRACTITIONER

## 2021-06-29 ASSESSMENT — PATIENT HEALTH QUESTIONNAIRE - PHQ9
2. FEELING DOWN, DEPRESSED OR HOPELESS: 0
SUM OF ALL RESPONSES TO PHQ9 QUESTIONS 1 & 2: 0
1. LITTLE INTEREST OR PLEASURE IN DOING THINGS: 0
SUM OF ALL RESPONSES TO PHQ QUESTIONS 1-9: 0

## 2021-06-29 NOTE — PROGRESS NOTES
[facility]  [phone]  Fax: [phone]   Pre-operative History and Physical    Bella Lunsford is a 63 y.o. male who is referred by Dr. Bhumika Ruff for a consultation for preoperative physical examination and medical clearance for surgery. Patient is scheduled to have cystoscopy at [facility] on 7-. DIAGNOSIS:  Hematuria. History Obtained From:  patient    HISTORY OF PRESENT ILLNESS:    The patient is a 63 y.o. male with significant past medical history of hematuria who presents for pre-op. Past Medical History:   Diagnosis Date    [facility] [address]     Blood clotting disorder [facility] [address]     Coronary artery disease 4/23/2014    Dr. Kiet Jean Baptiste hematology and Dr. Yobani Díaz Cardiology    Hyperlipidemia     DEE on CPAP     Osteoarthritis of right hip     Primary osteoarthritis of left hip 10/9/2017     Past Surgical History:   Procedure Laterality Date    CARDIAC CATHETERIZATION  2014    COLONOSCOPY      COLONOSCOPY  5/2/16    colon polyp, biopsy ascending lesion    DENTAL SURGERY  2015    FRACTURE SURGERY Right 2004    tib-fib    JOINT REPLACEMENT Left 2018    left hip    JOINT REPLACEMENT Right 08/2020    SKIN BIOPSY       Current Outpatient Medications   Medication Sig Dispense Refill    rivaroxaban (XARELTO) 20 MG TABS tablet TAKE 1 TABLET BY MOUTH EVERY DAY 90 tablet 3    atorvastatin (LIPITOR) 10 MG tablet TAKE 1 TABLET NIGHTLY AT   BEDTIME 90 tablet 3    amLODIPine (NORVASC) 5 MG tablet TAKE 1 TABLET DAILY 90 tablet 3    aspirin 81 MG chewable tablet CHEW 1 TABLET DAILY 90 tablet 3    nitroGLYCERIN (NITROSTAT) 0.4 MG SL tablet Place 1 tablet under the tongue every 5 minutes as needed for Chest pain. 25 tablet 11     No current facility-administered medications for this visit.          Allergies:  Lorazepam  History of allergic reaction to anesthesia:  No     Social History     Tobacco Use   Smoking Status Current Every Day Smoker    Packs/day: 0.50  Years: 30.00    Pack years: 15.00    Types: Cigarettes    Start date: 1/1/1980   Smokeless Tobacco Never Used   Tobacco Comment    5/6/20 - smokes 1/2 ppd     The patient states he drinks 0 per week. Family History   Problem Relation Age of Onset    Cancer Mother     High Cholesterol Father        REVIEW OF SYSTEMS:    CONSTITUTIONAL:  negative  EYES:  negative  HEENT:  negative  RESPIRATORY:  negative  CARDIOVASCULAR:  negative  GASTROINTESTINAL:  negative  GENITOURINARY:  negative  INTEGUMENT/BREAST:  negative  HEMATOLOGIC/LYMPHATIC:  negative  ALLERGIC/IMMUNOLOGIC:  negative  ENDOCRINE:  negative  MUSCULOSKELETAL:  negative  NEUROLOGICAL:  negative    PHYSICAL EXAM:      /78   Pulse 74   Temp 97.4 °F (36.3 °C)   Ht 5' 8.5\" (1.74 m)   Wt 242 lb 3.2 oz (109.9 kg)   SpO2 95%   BMI 36.29 kg/m²     CONSTITUTIONAL:  awake, alert, cooperative, no apparent distress, and appears stated age    Eyes:  Lids and lashes normal, pupils equal, round and reactive to light, extra ocular muscles intact, sclera clear, conjunctiva normal    Head/ENT:  Normocephalic, without obvious abnormality, atramatic, sinuses nontender on palpation, external ears without lesions, oral pharynx with moist mucus membranes, tonsils without erythema or exudates, gums normal and good dentition. TM bilateral cerumen impaction. Neck:  Supple, symmetrical, trachea midline, no adenopathy, thyroid symmetric, not enlarged and no tenderness, skin normal    Heart:  Normal apical impulse, regular rate and rhythm, normal S1 and S2, no S3 or S4, and no murmur noted    Lungs:  No increased work of breathing, good air exchange, clear to auscultation bilaterally, no crackles or wheezing    Abdomen:  , normal bowel sounds, soft, non-distended, non-tender, no masses palpated, no hepatosplenomegally    Extremities:  No clubbing, cyanosis, or edema    NEUROLOGIC:  Awake, alert, oriented to name, place and time.                   ASSESSMENT AND PLAN:    1. Patient is a 63 y.o. male with above specified procedure planned on 7- with Dr. Griselda Prakash [facility]. They will require cardiology evaluation prior to procedure. Dr. Chantale Manriquez has already given approval. Letter was faxed. 2. Stop ASA/NSAIDS medications 7-10 days prior to procedure. 3.Patient is cleared for surgery  4. Pre-op faxed to [facility]rology group.      Steven Bernal, APRN - CNP, CNP    [address]  [phone]

## 2021-09-14 RX ORDER — ATORVASTATIN CALCIUM 10 MG/1
TABLET, FILM COATED ORAL
Qty: 90 TABLET | Refills: 1 | Status: SHIPPED | OUTPATIENT
Start: 2021-09-14 | End: 2022-04-05 | Stop reason: SDUPTHER

## 2021-09-14 RX ORDER — AMLODIPINE BESYLATE 5 MG/1
TABLET ORAL
Qty: 90 TABLET | Refills: 1 | Status: SHIPPED | OUTPATIENT
Start: 2021-09-14 | End: 2022-04-01 | Stop reason: SDUPTHER

## 2021-10-28 ENCOUNTER — OFFICE VISIT (OUTPATIENT)
Dept: CARDIOLOGY CLINIC | Age: 61
End: 2021-10-28
Payer: COMMERCIAL

## 2021-10-28 VITALS
WEIGHT: 242 LBS | HEART RATE: 58 BPM | SYSTOLIC BLOOD PRESSURE: 118 MMHG | HEIGHT: 69 IN | OXYGEN SATURATION: 96 % | DIASTOLIC BLOOD PRESSURE: 72 MMHG | BODY MASS INDEX: 35.84 KG/M2

## 2021-10-28 DIAGNOSIS — I25.10 CORONARY ARTERY DISEASE INVOLVING NATIVE CORONARY ARTERY OF NATIVE HEART WITHOUT ANGINA PECTORIS: Primary | ICD-10-CM

## 2021-10-28 DIAGNOSIS — I77.810 ASCENDING AORTA DILATATION (HCC): ICD-10-CM

## 2021-10-28 DIAGNOSIS — Z72.0 TOBACCO ABUSE: ICD-10-CM

## 2021-10-28 DIAGNOSIS — E66.9 OBESITY (BMI 30-39.9): ICD-10-CM

## 2021-10-28 PROCEDURE — 99214 OFFICE O/P EST MOD 30 MIN: CPT | Performed by: INTERNAL MEDICINE

## 2021-10-28 RX ORDER — FINASTERIDE 5 MG/1
5 TABLET, FILM COATED ORAL DAILY
COMMUNITY
End: 2022-04-05 | Stop reason: SDUPTHER

## 2021-10-28 NOTE — PATIENT INSTRUCTIONS
Your provider has ordered testing for further evaluation. An order/prescription has been included in your paper work.  To schedule outpatient testing, contact Central Scheduling by calling [email or web address] ([phone]). Plan:  1. CTA Chest ~ assess ascending aorta size, no change in size from October 2020 testing.   ~ obtain testing in Aug. 2022   2. Encourage Smoking Abstinence  ~ 1-800-QUIT-NOW free smoking abstinence resources   3. Weight Management Encouraged   ~ Get at least 150 minutes per week of moderate-intensity aerobic activity or 75 minutes per week of high intensity aerobic activity. Examples of moderate intensity activity- brisk walking, water aerobics, gardening, or dancing. Examples of high intensity activity- hiking, running, swimming laps, heavy yard work, playing tennis, or biking. 4. Follow up in one year for office visit.

## 2021-10-28 NOTE — PROGRESS NOTES
therapy. BYPASS:  VASCULAR:    Past Medical History:   has a past medical history of Antiphospholipid syndrome ([facility] [address] Blood clotting disorder ([facility] [address] Coronary artery disease, Hyperlipidemia, DEE on CPAP, Osteoarthritis of right hip, and Primary osteoarthritis of left hip. Surgical History:   has a past surgical history that includes skin biopsy; fracture surgery (Right, 2004); Cardiac catheterization (2014); Dental surgery (2015); Colonoscopy; Colonoscopy (5/2/16); joint replacement (Left, 2018); and joint replacement (Right, 08/2020). Social History:   reports that he has been smoking cigarettes. He started smoking about 41 years ago. He has a 15.00 pack-year smoking history. He has never used smokeless tobacco. He reports that he does not drink alcohol and does not use drugs. Family History:  No evidence for sudden cardiac death or premature CAD    Home Medications:  Reviewed and are listed in nursing record. and/or listed below  Current Outpatient Medications   Medication Sig Dispense Refill    finasteride (PROSCAR) 5 MG tablet Take 5 mg by mouth daily      atorvastatin (LIPITOR) 10 MG tablet TAKE 1 TABLET EVERY NIGHT AT BEDTIME 90 tablet 1    amLODIPine (NORVASC) 5 MG tablet TAKE 1 TABLET EVERY DAY 90 tablet 1    rivaroxaban (XARELTO) 20 MG TABS tablet TAKE 1 TABLET EVERY DAY 90 tablet 1    aspirin 81 MG chewable tablet CHEW 1 TABLET DAILY 90 tablet 3    nitroGLYCERIN (NITROSTAT) 0.4 MG SL tablet Place 1 tablet under the tongue every 5 minutes as needed for Chest pain. 25 tablet 11     No current facility-administered medications for this visit. Allergies:  Lorazepam     Review of Systems:   All 14 point review of symptoms completed. Pertinent positives identified in the HPI, all other review of symptoms negative as below.     Review of Systems - History obtained from the patient  General ROS: negative for - chills, fever or night sweats  Psychological ROS: negative for - disorientation or hallucinations  Ophthalmic ROS: negative for - dry eyes, eye pain or loss of vision  ENT ROS: negative for - nasal discharge or sore throat  Allergy and Immunology ROS: negative for - hives or itchy/watery eyes  Hematological and Lymphatic ROS: negative for - jaundice or night sweats  Endocrine ROS: negative for - mood swings or temperature intolerance  Breast ROS: deferred  Respiratory ROS: negative for - hemoptysis or stridor  Cardiovascular ROS: negative for - chest pain, dyspnea on exertion or palpitations  Gastrointestinal ROS: no abdominal pain, change in bowel habits, or black or bloody stools  Genito-Urinary ROS: no dysuria, trouble voiding, or hematuria  Musculoskeletal ROS: negative for - gait disturbance, joint pain or joint stiffness  Neurological ROS: negative for - seizures or speech problems  Dermatological ROS: negative for - rash or skin lesion changes        Physical Examination:    Vitals:    10/28/21 0815   BP: 118/72   Pulse: 58   SpO2: 96%    Weight: 242 lb (109.8 kg)     Wt Readings from Last 3 Encounters:   10/28/21 242 lb (109.8 kg)   06/29/21 242 lb 3.2 oz (109.9 kg)   05/24/21 241 lb (109.3 kg)       General Appearance:  Alert, cooperative, no distress, appears stated age   Head:  Normocephalic, without obvious abnormality, atraumatic   Eyes:  PERRL, conjunctiva/corneas clear       Nose: Nares normal, no drainage or sinus tenderness   Throat: Lips, mucosa, and tongue normal   Neck: Supple, symmetrical, trachea midline, no adenopathy, thyroid: not enlarged, symmetric, no tenderness/mass/nodules, no carotid bruit or JVD       Lungs:   Coarse breath sounds, respirations unlabored   Chest Wall:  No tenderness or deformity   Heart:  Regular rhythm and normal rate; S1, S2 are normal; no murmur noted; no rub or gallop   Abdomen:   Soft, non-tender, bowel sounds active all four quadrants,  no masses, no organomegaly           Extremities: Extremities normal, atraumatic, no cyanosis or edema Pulses: 2+ and symmetric   Skin: Skin color, texture, turgor normal, no rashes or lesions   Pysch: Normal mood and affect   Neurologic: Normal gross motor and sensory exam.         Labs  CBC:   Lab Results   Component Value Date    WBC 7.7 09/16/2019    RBC 5.03 09/16/2019    RBC 4.97 04/13/2017    HGB 15.1 09/16/2019    HCT 43.5 09/16/2019    MCV 86.3 09/16/2019    RDW 14.0 09/16/2019     09/16/2019     CMP:    Lab Results   Component Value Date     09/16/2019    K 4.0 09/16/2019     09/16/2019    CO2 21 09/16/2019    BUN 18 09/16/2019    CREATININE 0.7 09/16/2019    GFRAA >60 09/16/2019    GFRAA >60 08/04/2011    AGRATIO 1.7 09/10/2017    LABGLOM >60 09/16/2019    GLUCOSE 101 09/16/2019    GLUCOSE 103 04/02/2015    PROT 6.9 09/16/2019    PROT 7.4 04/02/2015    CALCIUM 9.2 09/16/2019    BILITOT 0.5 09/16/2019    ALKPHOS 74 09/16/2019    AST 20 09/16/2019    ALT 29 09/16/2019     PT/INR:    No components found for: PTPATIENT,  PTINR  Lab Results   Component Value Date    TROPONINI <0.01 05/10/2019     No components found for: CHLPL  Lab Results   Component Value Date    TRIG 106 09/16/2019    TRIG 124 10/13/2016    TRIG 114 09/30/2015     Lab Results   Component Value Date    HDL 32 (L) 09/16/2019    HDL 29 (L) 10/13/2016    HDL 37 (L) 09/30/2015     Lab Results   Component Value Date    LDLCALC 47 09/16/2019    LDLCALC 66 09/30/2015    LDLCALC 86 04/07/2014     Lab Results   Component Value Date    LABVLDL 21 09/16/2019    LABVLDL 23 09/30/2015    LABVLDL 33 04/07/2014     Lab Results   Component Value Date    ALT 29 09/16/2019    AST 20 09/16/2019    ALKPHOS 74 09/16/2019    BILITOT 0.5 09/16/2019         Assessment:  63 y.o. patient with:  1. Coronary artery diease    ~laminar thrombus within the LAD   ~stable  2. Obesity  3. Tobacco abuse  4. Ascending aorta  enlargement    Plan:  1.  CTA Chest ~ assess ascending aorta size, no change in size from October 2020 testing.   ~ obtain testing in Aug. 2022   2. Encourage Smoking Abstinence  ~ 1-800-QUIT-NOW free smoking abstinence resources   3. Weight Management Encouraged   ~ Get at least 150 minutes per week of moderate-intensity aerobic activity or 75 minutes per week of high intensity aerobic activity. Examples of moderate intensity activity- brisk walking, water aerobics, gardening, or dancing. Examples of high intensity activity- hiking, running, swimming laps, heavy yard work, playing tennis, or biking. 4. Follow up in one year for office visit. Scribe's attestation: This note was scribed in the presence of Claudio Alfred by Lesley Ge RN       I, Dr. Juli Sanchez, personally performed the services described in this documentation, as scribed by the above signed scribe in my presence. It is both accurate and complete to my knowledge. I agree with the details independently gathered by the clinical support staff, while the remaining scribed note accurately describes my personal service to the patient. Medical Decision Making: The following items were considered in medical decision making:  Independent review of images  Review / order clinical lab tests  Review / order radiology tests  Decision to obtain old records  Review and summation of old records as accessed through [address] [place] [address] (a summary of my findings in these old records)      Time Based Itemization  A total of 30 minutes was spent on today's patient encounter. If applicable, non-patient-facing activities:  (X)Preparing to see the patient and reviewing records  (X) Individual interpretation of results  ( ) Discussion or coordination of care with other health care professionals  () Ordering of unique tests, medications, or procedures  (X) Documentation within the EHR             All questions and concerns were addressed to the patient/family. Alternatives to my treatment were discussed. The note was completed using EMR.  Every effort was made to ensure accuracy; however, inadvertent computerized transcription errors may be present.     Thao Monson MD, [address], [facility] [place], [place]  [phone] [facility] office  [phone] [facility]  10/28/2021  8:58 AM

## 2021-10-28 NOTE — LETTER
Alfreda Mandujano  1960        [address]   Cardiovascular Evaluation    PATIENT: Alfreda Mandujano  DATE: 10/28/2021  MRN: <[id number]>  CSN: [id number]  : 1960    Primary Care Doctor: Sosa Avery MD    Reason for evaluation:   1 Year Follow Up and Coronary Artery Disease      Subjective:    History of present illness on initial date of evaluation:   Alfreda Mandujano is a 63 y.o. patient who presents for follow up. his echocardiogram from 2019 showed an ejection fraction of 55% with a dilated aortic root. He then underwent a chest CT that showed the dilation to be 4.2cm. He had a repeat Chest CTA  on 10/13/20 that showed mild dilation of ascending aorta with no change in size, 4.2 cm. He also had an Echocardiogram completed and showed ejection fraction of 55%, mild left ventricular hypertrophy, mild dilation of ascending aorta and mild mitral regurgitation. Today he states he is having issues with balancing smoking abstinence and weight management. Patient Active Problem List   Diagnosis    Coronary artery disease    History of MTHFR mutation    Antiphospholipid antibody positive    Moderate obstructive sleep apnea    Obesity (BMI 30-39. 9)    Primary osteoarthritis of left hip    Tobacco abuse    Ascending aorta dilatation (HCC)         Cardiac Testing: I have reviewed the findings below. CATH: 2014   CONCLUSIONS:   1. Ectatic coronary arteries with organized laminar thrombus in the left   anterior descending. 2. Normal left ventricular function. 3. Normal hemodynamics. RECOMMENDATIONS:   1. At this point, the recommendation is to admit the patient to the hospital   at [facility]. 2. I am going to consult hematology for workup of hypercoagulable state. 3. Start aspirin and Plavix for additional antiplatelet therapy. 4. We will consider Coumadin or similar anticoagulants pending hematological   workup.  In addition, I will start him on statin therapy. BYPASS:  VASCULAR:    Past Medical History:   has a past medical history of Antiphospholipid syndrome ([facility] [address] Blood clotting disorder ([facility] [address] Coronary artery disease, Hyperlipidemia, DEE on CPAP, Osteoarthritis of right hip, and Primary osteoarthritis of left hip. Surgical History:   has a past surgical history that includes skin biopsy; fracture surgery (Right, 2004); Cardiac catheterization (2014); Dental surgery (2015); Colonoscopy; Colonoscopy (5/2/16); joint replacement (Left, 2018); and joint replacement (Right, 08/2020). Social History:   reports that he has been smoking cigarettes. He started smoking about 41 years ago. He has a 15.00 pack-year smoking history. He has never used smokeless tobacco. He reports that he does not drink alcohol and does not use drugs. Family History:  No evidence for sudden cardiac death or premature CAD    Home Medications:  Reviewed and are listed in nursing record. and/or listed below  Current Outpatient Medications   Medication Sig Dispense Refill    finasteride (PROSCAR) 5 MG tablet Take 5 mg by mouth daily      atorvastatin (LIPITOR) 10 MG tablet TAKE 1 TABLET EVERY NIGHT AT BEDTIME 90 tablet 1    amLODIPine (NORVASC) 5 MG tablet TAKE 1 TABLET EVERY DAY 90 tablet 1    rivaroxaban (XARELTO) 20 MG TABS tablet TAKE 1 TABLET EVERY DAY 90 tablet 1    aspirin 81 MG chewable tablet CHEW 1 TABLET DAILY 90 tablet 3    nitroGLYCERIN (NITROSTAT) 0.4 MG SL tablet Place 1 tablet under the tongue every 5 minutes as needed for Chest pain. 25 tablet 11     No current facility-administered medications for this visit. Allergies:  Lorazepam     Review of Systems:   All 14 point review of symptoms completed. Pertinent positives identified in the HPI, all other review of symptoms negative as below.     Review of Systems - History obtained from the patient  General ROS: negative for - chills, fever or night sweats  Psychological ROS: negative for - disorientation or hallucinations  Ophthalmic ROS: negative for - dry eyes, eye pain or loss of vision  ENT ROS: negative for - nasal discharge or sore throat  Allergy and Immunology ROS: negative for - hives or itchy/watery eyes  Hematological and Lymphatic ROS: negative for - jaundice or night sweats  Endocrine ROS: negative for - mood swings or temperature intolerance  Breast ROS: deferred  Respiratory ROS: negative for - hemoptysis or stridor  Cardiovascular ROS: negative for - chest pain, dyspnea on exertion or palpitations  Gastrointestinal ROS: no abdominal pain, change in bowel habits, or black or bloody stools  Genito-Urinary ROS: no dysuria, trouble voiding, or hematuria  Musculoskeletal ROS: negative for - gait disturbance, joint pain or joint stiffness  Neurological ROS: negative for - seizures or speech problems  Dermatological ROS: negative for - rash or skin lesion changes        Physical Examination:    Vitals:    10/28/21 0815   BP: 118/72   Pulse: 58   SpO2: 96%    Weight: 242 lb (109.8 kg)     Wt Readings from Last 3 Encounters:   10/28/21 242 lb (109.8 kg)   06/29/21 242 lb 3.2 oz (109.9 kg)   05/24/21 241 lb (109.3 kg)       General Appearance:  Alert, cooperative, no distress, appears stated age   Head:  Normocephalic, without obvious abnormality, atraumatic   Eyes:  PERRL, conjunctiva/corneas clear       Nose: Nares normal, no drainage or sinus tenderness   Throat: Lips, mucosa, and tongue normal   Neck: Supple, symmetrical, trachea midline, no adenopathy, thyroid: not enlarged, symmetric, no tenderness/mass/nodules, no carotid bruit or JVD       Lungs:   Coarse breath sounds, respirations unlabored   Chest Wall:  No tenderness or deformity   Heart:  Regular rhythm and normal rate; S1, S2 are normal; no murmur noted; no rub or gallop   Abdomen:   Soft, non-tender, bowel sounds active all four quadrants,  no masses, no organomegaly           Extremities: Extremities normal, atraumatic, no cyanosis or edema   Pulses: 2+ and symmetric   Skin: Skin color, texture, turgor normal, no rashes or lesions   Pysch: Normal mood and affect   Neurologic: Normal gross motor and sensory exam.         Labs  CBC:   Lab Results   Component Value Date    WBC 7.7 09/16/2019    RBC 5.03 09/16/2019    RBC 4.97 04/13/2017    HGB 15.1 09/16/2019    HCT 43.5 09/16/2019    MCV 86.3 09/16/2019    RDW 14.0 09/16/2019     09/16/2019     CMP:    Lab Results   Component Value Date     09/16/2019    K 4.0 09/16/2019     09/16/2019    CO2 21 09/16/2019    BUN 18 09/16/2019    CREATININE 0.7 09/16/2019    GFRAA >60 09/16/2019    GFRAA >60 08/04/2011    AGRATIO 1.7 09/10/2017    LABGLOM >60 09/16/2019    GLUCOSE 101 09/16/2019    GLUCOSE 103 04/02/2015    PROT 6.9 09/16/2019    PROT 7.4 04/02/2015    CALCIUM 9.2 09/16/2019    BILITOT 0.5 09/16/2019    ALKPHOS 74 09/16/2019    AST 20 09/16/2019    ALT 29 09/16/2019     PT/INR:    No components found for: PTPATIENT,  PTINR  Lab Results   Component Value Date    TROPONINI <0.01 05/10/2019     No components found for: CHLPL  Lab Results   Component Value Date    TRIG 106 09/16/2019    TRIG 124 10/13/2016    TRIG 114 09/30/2015     Lab Results   Component Value Date    HDL 32 (L) 09/16/2019    HDL 29 (L) 10/13/2016    HDL 37 (L) 09/30/2015     Lab Results   Component Value Date    LDLCALC 47 09/16/2019    LDLCALC 66 09/30/2015    LDLCALC 86 04/07/2014     Lab Results   Component Value Date    LABVLDL 21 09/16/2019    LABVLDL 23 09/30/2015    LABVLDL 33 04/07/2014     Lab Results   Component Value Date    ALT 29 09/16/2019    AST 20 09/16/2019    ALKPHOS 74 09/16/2019    BILITOT 0.5 09/16/2019         Assessment:  63 y.o. patient with:  1. Coronary artery diease    ~laminar thrombus within the LAD   ~stable  2. Obesity  3. Tobacco abuse  4. Ascending aorta  enlargement    Plan:  1.  CTA Chest ~ assess ascending aorta size, no change in size from October 2020 testing.   ~ obtain testing in Aug. 2022   2. Encourage Smoking Abstinence  ~ 1-800-QUIT-NOW free smoking abstinence resources   3. Weight Management Encouraged   ~ Get at least 150 minutes per week of moderate-intensity aerobic activity or 75 minutes per week of high intensity aerobic activity. Examples of moderate intensity activity- brisk walking, water aerobics, gardening, or dancing. Examples of high intensity activity- hiking, running, swimming laps, heavy yard work, playing tennis, or biking. 4. Follow up in one year for office visit. Scribe's attestation: This note was scribed in the presence of Ansley Kimble by Shamir Vargas RN       I, Dr. Rashawn Holcomb, personally performed the services described in this documentation, as scribed by the above signed scribe in my presence. It is both accurate and complete to my knowledge. I agree with the details independently gathered by the clinical support staff, while the remaining scribed note accurately describes my personal service to the patient. Medical Decision Making: The following items were considered in medical decision making:  Independent review of images  Review / order clinical lab tests  Review / order radiology tests  Decision to obtain old records  Review and summation of old records as accessed through [employer] (a summary of my findings in these old records)      Time Based Itemization  A total of 30 minutes was spent on today's patient encounter. If applicable, non-patient-facing activities:  (X)Preparing to see the patient and reviewing records  (X) Individual interpretation of results  ( ) Discussion or coordination of care with other health care professionals  () Ordering of unique tests, medications, or procedures  (X) Documentation within the EHR             All questions and concerns were addressed to the patient/family. Alternatives to my treatment were discussed. The note was completed using EMR.  Every effort was made to ensure accuracy; however, inadvertent computerized transcription errors may be present.     Leon Haynes MD, [address], [facility]  [phone] [facility]  [phone] [facility]  10/28/2021  8:58 AM

## 2022-01-15 ENCOUNTER — CLINICAL DOCUMENTATION (OUTPATIENT)
Dept: OTHER | Age: 62
End: 2022-01-15

## 2022-03-06 ENCOUNTER — APPOINTMENT (OUTPATIENT)
Dept: CT IMAGING | Age: 62
DRG: 378 | End: 2022-03-06
Payer: COMMERCIAL

## 2022-03-06 ENCOUNTER — HOSPITAL ENCOUNTER (INPATIENT)
Age: 62
LOS: 2 days | Discharge: HOME | DRG: 378 | End: 2022-03-08
Attending: EMERGENCY MEDICINE | Admitting: INTERNAL MEDICINE
Payer: COMMERCIAL

## 2022-03-06 DIAGNOSIS — Z79.01 ON CONTINUOUS ORAL ANTICOAGULATION: ICD-10-CM

## 2022-03-06 DIAGNOSIS — K92.2 GASTROINTESTINAL HEMORRHAGE, UNSPECIFIED GASTROINTESTINAL HEMORRHAGE TYPE: Primary | ICD-10-CM

## 2022-03-06 DIAGNOSIS — R55 SYNCOPE AND COLLAPSE: ICD-10-CM

## 2022-03-06 DIAGNOSIS — K57.32 DIVERTICULITIS OF COLON: ICD-10-CM

## 2022-03-06 PROBLEM — K62.5 BRBPR (BRIGHT RED BLOOD PER RECTUM): Status: ACTIVE | Noted: 2022-03-06

## 2022-03-06 LAB
A/G RATIO: 2.6 (ref 1.1–2.2)
ABO/RH: NORMAL
ALBUMIN SERPL-MCNC: 4.6 G/DL (ref 3.4–5)
ALP BLD-CCNC: 82 U/L (ref 40–129)
ALT SERPL-CCNC: 19 U/L (ref 10–40)
ANION GAP SERPL CALCULATED.3IONS-SCNC: 13 MMOL/L (ref 3–16)
ANTI-XA UNFRAC HEPARIN: 1.21 IU/ML (ref 0.3–0.7)
ANTIBODY SCREEN: NORMAL
APTT: 37.1 SEC (ref 26.2–38.6)
AST SERPL-CCNC: 15 U/L (ref 15–37)
BACTERIA: ABNORMAL /HPF
BASOPHILS ABSOLUTE: 0 K/UL (ref 0–0.2)
BASOPHILS RELATIVE PERCENT: 0.4 %
BILIRUB SERPL-MCNC: 0.9 MG/DL (ref 0–1)
BILIRUBIN URINE: NEGATIVE
BLOOD, URINE: ABNORMAL
BUN BLDV-MCNC: 12 MG/DL (ref 7–20)
CALCIUM SERPL-MCNC: 9.2 MG/DL (ref 8.3–10.6)
CHLORIDE BLD-SCNC: 104 MMOL/L (ref 99–110)
CLARITY: ABNORMAL
CO2: 21 MMOL/L (ref 21–32)
COLOR: ABNORMAL
CREAT SERPL-MCNC: 0.7 MG/DL (ref 0.9–1.3)
CRYSTALS, UA: ABNORMAL /HPF
EOSINOPHILS ABSOLUTE: 0 K/UL (ref 0–0.6)
EOSINOPHILS RELATIVE PERCENT: 0.4 %
GFR AFRICAN AMERICAN: >60
GFR NON-AFRICAN AMERICAN: >60
GLUCOSE BLD-MCNC: 120 MG/DL (ref 70–99)
GLUCOSE URINE: NEGATIVE MG/DL
HCT VFR BLD CALC: 38.4 % (ref 40.5–52.5)
HCT VFR BLD CALC: 44.5 % (ref 40.5–52.5)
HEMOGLOBIN: 13.5 G/DL (ref 13.5–17.5)
HEMOGLOBIN: 15.4 G/DL (ref 13.5–17.5)
KETONES, URINE: NEGATIVE MG/DL
LACTIC ACID, SEPSIS: 0.8 MMOL/L (ref 0.4–1.9)
LACTIC ACID, SEPSIS: 1 MMOL/L (ref 0.4–1.9)
LACTIC ACID, SEPSIS: 1.4 MMOL/L (ref 0.4–1.9)
LEUKOCYTE ESTERASE, URINE: NEGATIVE
LYMPHOCYTES ABSOLUTE: 1.5 K/UL (ref 1–5.1)
LYMPHOCYTES RELATIVE PERCENT: 14.4 %
MCH RBC QN AUTO: 29.7 PG (ref 26–34)
MCHC RBC AUTO-ENTMCNC: 34.6 G/DL (ref 31–36)
MCV RBC AUTO: 85.8 FL (ref 80–100)
MICROSCOPIC EXAMINATION: YES
MONOCYTES ABSOLUTE: 0.6 K/UL (ref 0–1.3)
MONOCYTES RELATIVE PERCENT: 5.9 %
MUCUS: ABNORMAL /LPF
NEUTROPHILS ABSOLUTE: 8.2 K/UL (ref 1.7–7.7)
NEUTROPHILS RELATIVE PERCENT: 78.9 %
NITRITE, URINE: NEGATIVE
OCCULT BLOOD SCREENING: ABNORMAL
PDW BLD-RTO: 13.7 % (ref 12.4–15.4)
PH UA: 6 (ref 5–8)
PLATELET # BLD: 190 K/UL (ref 135–450)
PMV BLD AUTO: 8.3 FL (ref 5–10.5)
POTASSIUM REFLEX MAGNESIUM: 3.7 MMOL/L (ref 3.5–5.1)
PROTEIN UA: NEGATIVE MG/DL
RBC # BLD: 5.19 M/UL (ref 4.2–5.9)
RBC UA: ABNORMAL /HPF (ref 0–4)
RENAL EPITHELIAL, UA: ABNORMAL /HPF (ref 0–1)
SODIUM BLD-SCNC: 138 MMOL/L (ref 136–145)
SPECIFIC GRAVITY UA: 1.02 (ref 1–1.03)
TOTAL PROTEIN: 6.4 G/DL (ref 6.4–8.2)
URINE TYPE: ABNORMAL
UROBILINOGEN, URINE: 0.2 E.U./DL
WBC # BLD: 10.4 K/UL (ref 4–11)
WBC UA: ABNORMAL /HPF (ref 0–5)

## 2022-03-06 PROCEDURE — 2580000003 HC RX 258: Performed by: NURSE PRACTITIONER

## 2022-03-06 PROCEDURE — 85730 THROMBOPLASTIN TIME PARTIAL: CPT

## 2022-03-06 PROCEDURE — 96375 TX/PRO/DX INJ NEW DRUG ADDON: CPT

## 2022-03-06 PROCEDURE — 96361 HYDRATE IV INFUSION ADD-ON: CPT

## 2022-03-06 PROCEDURE — 6360000002 HC RX W HCPCS: Performed by: NURSE PRACTITIONER

## 2022-03-06 PROCEDURE — 83605 ASSAY OF LACTIC ACID: CPT

## 2022-03-06 PROCEDURE — G0378 HOSPITAL OBSERVATION PER HR: HCPCS

## 2022-03-06 PROCEDURE — 96365 THER/PROPH/DIAG IV INF INIT: CPT

## 2022-03-06 PROCEDURE — 99284 EMERGENCY DEPT VISIT MOD MDM: CPT

## 2022-03-06 PROCEDURE — 2500000003 HC RX 250 WO HCPCS: Performed by: NURSE PRACTITIONER

## 2022-03-06 PROCEDURE — 6360000004 HC RX CONTRAST MEDICATION: Performed by: NURSE PRACTITIONER

## 2022-03-06 PROCEDURE — 96367 TX/PROPH/DG ADDL SEQ IV INF: CPT

## 2022-03-06 PROCEDURE — 36415 COLL VENOUS BLD VENIPUNCTURE: CPT

## 2022-03-06 PROCEDURE — 74174 CTA ABD&PLVS W/CONTRAST: CPT

## 2022-03-06 PROCEDURE — 93005 ELECTROCARDIOGRAM TRACING: CPT | Performed by: NURSE PRACTITIONER

## 2022-03-06 PROCEDURE — 85025 COMPLETE CBC W/AUTO DIFF WBC: CPT

## 2022-03-06 PROCEDURE — 86901 BLOOD TYPING SEROLOGIC RH(D): CPT

## 2022-03-06 PROCEDURE — C9113 INJ PANTOPRAZOLE SODIUM, VIA: HCPCS | Performed by: NURSE PRACTITIONER

## 2022-03-06 PROCEDURE — 80053 COMPREHEN METABOLIC PANEL: CPT

## 2022-03-06 PROCEDURE — 86850 RBC ANTIBODY SCREEN: CPT

## 2022-03-06 PROCEDURE — 1200000000 HC SEMI PRIVATE

## 2022-03-06 PROCEDURE — 85520 HEPARIN ASSAY: CPT

## 2022-03-06 PROCEDURE — 81001 URINALYSIS AUTO W/SCOPE: CPT

## 2022-03-06 PROCEDURE — 85014 HEMATOCRIT: CPT

## 2022-03-06 PROCEDURE — G0328 FECAL BLOOD SCRN IMMUNOASSAY: HCPCS

## 2022-03-06 PROCEDURE — 96368 THER/DIAG CONCURRENT INF: CPT

## 2022-03-06 PROCEDURE — 85018 HEMOGLOBIN: CPT

## 2022-03-06 PROCEDURE — 86900 BLOOD TYPING SEROLOGIC ABO: CPT

## 2022-03-06 RX ORDER — SODIUM CHLORIDE 9 MG/ML
50 INJECTION, SOLUTION INTRAVENOUS ONCE
Status: DISCONTINUED | OUTPATIENT
Start: 2022-03-06 | End: 2022-03-08 | Stop reason: HOSPADM

## 2022-03-06 RX ORDER — 0.9 % SODIUM CHLORIDE 0.9 %
1000 INTRAVENOUS SOLUTION INTRAVENOUS ONCE
Status: COMPLETED | OUTPATIENT
Start: 2022-03-06 | End: 2022-03-06

## 2022-03-06 RX ORDER — SODIUM CHLORIDE 9 MG/ML
10 INJECTION INTRAVENOUS EVERY 12 HOURS
Status: DISCONTINUED | OUTPATIENT
Start: 2022-03-06 | End: 2022-03-08 | Stop reason: HOSPADM

## 2022-03-06 RX ORDER — ACETAMINOPHEN 650 MG/1
650 SUPPOSITORY RECTAL EVERY 6 HOURS PRN
Status: DISCONTINUED | OUTPATIENT
Start: 2022-03-06 | End: 2022-03-08 | Stop reason: HOSPADM

## 2022-03-06 RX ORDER — ATORVASTATIN CALCIUM 10 MG/1
10 TABLET, FILM COATED ORAL NIGHTLY
Status: DISCONTINUED | OUTPATIENT
Start: 2022-03-06 | End: 2022-03-08 | Stop reason: HOSPADM

## 2022-03-06 RX ORDER — NICOTINE 21 MG/24HR
1 PATCH, TRANSDERMAL 24 HOURS TRANSDERMAL DAILY
Status: DISCONTINUED | OUTPATIENT
Start: 2022-03-06 | End: 2022-03-08 | Stop reason: HOSPADM

## 2022-03-06 RX ORDER — ACETAMINOPHEN 325 MG/1
650 TABLET ORAL EVERY 6 HOURS PRN
Status: DISCONTINUED | OUTPATIENT
Start: 2022-03-06 | End: 2022-03-08 | Stop reason: HOSPADM

## 2022-03-06 RX ORDER — ASPIRIN 81 MG/1
81 TABLET, CHEWABLE ORAL DAILY
Status: DISCONTINUED | OUTPATIENT
Start: 2022-03-07 | End: 2022-03-08 | Stop reason: HOSPADM

## 2022-03-06 RX ORDER — SODIUM CHLORIDE 0.9 % (FLUSH) 0.9 %
5-40 SYRINGE (ML) INJECTION PRN
Status: DISCONTINUED | OUTPATIENT
Start: 2022-03-06 | End: 2022-03-08 | Stop reason: HOSPADM

## 2022-03-06 RX ORDER — AMLODIPINE BESYLATE 5 MG/1
5 TABLET ORAL DAILY
Status: DISCONTINUED | OUTPATIENT
Start: 2022-03-07 | End: 2022-03-08 | Stop reason: HOSPADM

## 2022-03-06 RX ORDER — ONDANSETRON 4 MG/1
4 TABLET, ORALLY DISINTEGRATING ORAL EVERY 8 HOURS PRN
Status: DISCONTINUED | OUTPATIENT
Start: 2022-03-06 | End: 2022-03-08 | Stop reason: HOSPADM

## 2022-03-06 RX ORDER — SODIUM CHLORIDE 9 MG/ML
25 INJECTION, SOLUTION INTRAVENOUS PRN
Status: DISCONTINUED | OUTPATIENT
Start: 2022-03-06 | End: 2022-03-08 | Stop reason: HOSPADM

## 2022-03-06 RX ORDER — SODIUM CHLORIDE 9 MG/ML
INJECTION, SOLUTION INTRAVENOUS CONTINUOUS
Status: ACTIVE | OUTPATIENT
Start: 2022-03-06 | End: 2022-03-07

## 2022-03-06 RX ORDER — ONDANSETRON 2 MG/ML
4 INJECTION INTRAMUSCULAR; INTRAVENOUS EVERY 6 HOURS PRN
Status: DISCONTINUED | OUTPATIENT
Start: 2022-03-06 | End: 2022-03-08 | Stop reason: HOSPADM

## 2022-03-06 RX ORDER — PANTOPRAZOLE SODIUM 40 MG/10ML
40 INJECTION, POWDER, LYOPHILIZED, FOR SOLUTION INTRAVENOUS EVERY 12 HOURS
Status: DISCONTINUED | OUTPATIENT
Start: 2022-03-06 | End: 2022-03-08 | Stop reason: HOSPADM

## 2022-03-06 RX ORDER — FINASTERIDE 5 MG/1
5 TABLET, FILM COATED ORAL DAILY
Status: DISCONTINUED | OUTPATIENT
Start: 2022-03-07 | End: 2022-03-08 | Stop reason: HOSPADM

## 2022-03-06 RX ORDER — CIPROFLOXACIN 2 MG/ML
400 INJECTION, SOLUTION INTRAVENOUS EVERY 12 HOURS
Status: DISCONTINUED | OUTPATIENT
Start: 2022-03-07 | End: 2022-03-08 | Stop reason: HOSPADM

## 2022-03-06 RX ORDER — CIPROFLOXACIN 2 MG/ML
400 INJECTION, SOLUTION INTRAVENOUS ONCE
Status: COMPLETED | OUTPATIENT
Start: 2022-03-06 | End: 2022-03-06

## 2022-03-06 RX ORDER — SODIUM CHLORIDE 0.9 % (FLUSH) 0.9 %
5-40 SYRINGE (ML) INJECTION EVERY 12 HOURS SCHEDULED
Status: DISCONTINUED | OUTPATIENT
Start: 2022-03-06 | End: 2022-03-08 | Stop reason: HOSPADM

## 2022-03-06 RX ADMIN — PROTHROMBIN, COAGULATION FACTOR VII HUMAN, COAGULATION FACTOR IX HUMAN, COAGULATION FACTOR X HUMAN, PROTEIN C, PROTEIN S HUMAN, AND WATER 2000 UNITS: KIT at 18:05

## 2022-03-06 RX ADMIN — METRONIDAZOLE 500 MG: 500 INJECTION, SOLUTION INTRAVENOUS at 20:39

## 2022-03-06 RX ADMIN — PANTOPRAZOLE SODIUM 40 MG: 40 INJECTION, POWDER, FOR SOLUTION INTRAVENOUS at 22:01

## 2022-03-06 RX ADMIN — SODIUM CHLORIDE 1000 ML: 9 INJECTION, SOLUTION INTRAVENOUS at 17:11

## 2022-03-06 RX ADMIN — SODIUM CHLORIDE, PRESERVATIVE FREE 10 ML: 5 INJECTION INTRAVENOUS at 22:02

## 2022-03-06 RX ADMIN — IOPAMIDOL 75 ML: 755 INJECTION, SOLUTION INTRAVENOUS at 18:34

## 2022-03-06 RX ADMIN — SODIUM CHLORIDE, PRESERVATIVE FREE 10 ML: 5 INJECTION INTRAVENOUS at 22:01

## 2022-03-06 RX ADMIN — CIPROFLOXACIN 400 MG: 2 INJECTION, SOLUTION INTRAVENOUS at 20:37

## 2022-03-06 RX ADMIN — SODIUM CHLORIDE: 9 INJECTION, SOLUTION INTRAVENOUS at 21:59

## 2022-03-06 ASSESSMENT — PAIN SCALES - GENERAL: PAINLEVEL_OUTOF10: 3

## 2022-03-06 ASSESSMENT — PAIN DESCRIPTION - LOCATION: LOCATION: ABDOMEN

## 2022-03-06 ASSESSMENT — PAIN DESCRIPTION - PAIN TYPE: TYPE: ACUTE PAIN

## 2022-03-06 ASSESSMENT — PAIN DESCRIPTION - ORIENTATION: ORIENTATION: LEFT;LOWER

## 2022-03-06 ASSESSMENT — PAIN - FUNCTIONAL ASSESSMENT: PAIN_FUNCTIONAL_ASSESSMENT: 0-10

## 2022-03-06 NOTE — ED NOTES
@[id number] Called GI Per Rodolfo CEDILLO  RE:Lower GIB, on xarelto, passed out in ER  @1800 Dr. Radha Loredo called and spoke to [facility] CNP

## 2022-03-06 NOTE — ED PROVIDER NOTES
Evaluated by Advanced Practice Provider    [facility]  ED    CHIEF COMPLAINT  Abdominal Pain (pain in left side began thursday/friday. lower left side. pt had bowel movement and dark blood. pt denies nausea/vomiting  denies injury   hx diverticulitis )    HISTORY OF PRESENT ILLNESS  Brandin Mcneil is a 63 y.o. male who presents to the ED complaining of abdominal pain and bloody stool. Left lower abdominal pain since Friday. Denies nausea, vomiting. About 1 hour prior to coming to ER he developed diarrhea and bloody stool. He is on xarelto for history of antiphospholipid, has had a blood clot near his heart. Took his xarelto dose late this morning. Reports pain started Friday, thought he drank to much water. No nausea, no vomiting, No CP or SOB. No fevers, chills, sweats. Bloody stools started about an hour prior to arriving. Abdominal pain left lower. The patient is currently rating their pain as 3/10 and describes it as an aching type of pain. Treatments tried prior to arrival in the ED: none. The patient arrived to the ED via private car.     PAST MEDICAL HISTORY    Past Medical History:   Diagnosis Date    Antiphospholipid syndrome ([address]     Blood clotting disorder ([facility] [address]     Coronary artery disease 4/23/2014    Dr. Page Slaughter hematology and Dr. Az Vázquez Cardiology    Hyperlipidemia     DEE on CPAP     Osteoarthritis of right hip     Primary osteoarthritis of left hip 10/9/2017       SURGICAL HISTORY    Past Surgical History:   Procedure Laterality Date    CARDIAC CATHETERIZATION  2014    COLONOSCOPY      COLONOSCOPY  5/2/16    colon polyp, biopsy ascending lesion    DENTAL SURGERY  2015    FRACTURE SURGERY Right 2004    tib-fib    JOINT REPLACEMENT Left 2018    left hip    JOINT REPLACEMENT Right 08/2020    SKIN BIOPSY         CURRENT MEDICATIONS    Current Outpatient Rx   Medication Sig Dispense Refill    finasteride (PROSCAR) 5 MG tablet Take 5 mg by mouth daily      Not on file   Social Connections:     Frequency of Communication with Friends and Family: Not on file    Frequency of Social Gatherings with Friends and Family: Not on file    Attends Sikh Services: Not on file    Active Member of Clubs or Organizations: Not on file    Attends Club or Organization Meetings: Not on file    Marital Status: Not on file   Intimate Partner Violence:     Fear of Current or Ex-Partner: Not on file    Emotionally Abused: Not on file    Physically Abused: Not on file    Sexually Abused: Not on file   Housing Stability:     Unable to Pay for Housing in the Last Year: Not on file    Number of [place] in the Last Year: Not on file    Unstable Housing in the Last Year: Not on file     REVIEW OFSYSTEMS    10systems reviewed, pertinent positives per HPI otherwise noted to be negative. PHYSICAL EXAM  Physical Exam  Vitals:    03/06/22 1848   BP:    Pulse: 61   Resp: 20   Temp:    SpO2: 95%     GENERAL: Patient is well-developed, obese. Awake andalert. Cooperative. Moderately distressed due to current symptoms. HEENT:  Normocephalic, atraumatic. Conjunctivaappear normal. Sclera is non-icteric. External ears are normal.    NECK: Supple with normal ROM. Tracheamidline  LUNGS: Equal and symmetric chest rise. Breathing is unlabored. Lungs are clear bilaterally to auscultation. Without wheezing, rales, or rhonchi. CADIOVASCULAR:  Regular rate and rhythm. Normal S1-S2 sounds. No murmurs, rubs, or gallops. GI: Soft, left lower quadrant tenderness to palpation, nondistended with positive bowel sounds. No rebound tenderness, guarding or rigidity. NEgative Pham's sign. Negative Rovsing's sign. No CVAT to palpation. No masses or hepatosplenomegaly to palpation. Rectal exam: negative without mass, lesions or tenderness, GEMA positive for bloody stool, sphincter tone normal, stool guaiac positive, exam chaperoned by ED RN.   MUSCULOSKELETAL:  No gross deformities or trauma noted. Moving all extremities equally and appropriately. Normal ROM. SKIN: Warm/dry. Skin is intact. No rashes or lesions noted. PSYCHIATRIC: Mood and affect appropriate. Speech is clear and articulate. NEUROLOGIC: Alert and oriented. No focal motor or sensory deficits.      LABS   Results for orders placed or performed during the hospital encounter of 03/06/22   CBC with Auto Differential   Result Value Ref Range    WBC 10.4 4.0 - 11.0 K/uL    RBC 5.19 4.20 - 5.90 M/uL    Hemoglobin 15.4 13.5 - 17.5 g/dL    Hematocrit 44.5 40.5 - 52.5 %    MCV 85.8 80.0 - 100.0 fL    MCH 29.7 26.0 - 34.0 pg    MCHC 34.6 31.0 - 36.0 g/dL    RDW 13.7 12.4 - 15.4 %    Platelets 138 196 - 321 K/uL    MPV 8.3 5.0 - 10.5 fL    Neutrophils % 78.9 %    Lymphocytes % 14.4 %    Monocytes % 5.9 %    Eosinophils % 0.4 %    Basophils % 0.4 %    Neutrophils Absolute 8.2 (H) 1.7 - 7.7 K/uL    Lymphocytes Absolute 1.5 1.0 - 5.1 K/uL    Monocytes Absolute 0.6 0.0 - 1.3 K/uL    Eosinophils Absolute 0.0 0.0 - 0.6 K/uL    Basophils Absolute 0.0 0.0 - 0.2 K/uL   Comprehensive Metabolic Panel w/ Reflex to MG   Result Value Ref Range    Sodium 138 136 - 145 mmol/L    Potassium reflex Magnesium 3.7 3.5 - 5.1 mmol/L    Chloride 104 99 - 110 mmol/L    CO2 21 21 - 32 mmol/L    Anion Gap 13 3 - 16    Glucose 120 (H) 70 - 99 mg/dL    BUN 12 7 - 20 mg/dL    CREATININE 0.7 (L) 0.9 - 1.3 mg/dL    GFR Non-African American >60 >60    GFR African American >60 >60    Calcium 9.2 8.3 - 10.6 mg/dL    Total Protein 6.4 6.4 - 8.2 g/dL    Albumin 4.6 3.4 - 5.0 g/dL    Albumin/Globulin Ratio 2.6 (H) 1.1 - 2.2    Total Bilirubin 0.9 0.0 - 1.0 mg/dL    Alkaline Phosphatase 82 40 - 129 U/L    ALT 19 10 - 40 U/L    AST 15 15 - 37 U/L   Lactate, Sepsis   Result Value Ref Range    Lactic Acid, Sepsis 1.4 0.4 - 1.9 mmol/L   Urinalysis   Result Value Ref Range    Color, UA DARK YELLOW (A) Straw/Yellow    Clarity, UA SL CLOUDY (A) Clear    Glucose, Ur Negative Negative mg/dL Bilirubin Urine Negative Negative    Ketones, Urine Negative Negative mg/dL    Specific Gravity, UA 1.020 1.005 - 1.030    Blood, Urine LARGE (A) Negative    pH, UA 6.0 5.0 - 8.0    Protein, UA Negative Negative mg/dL    Urobilinogen, Urine 0.2 <2.0 E.U./dL    Nitrite, Urine Negative Negative    Leukocyte Esterase, Urine Negative Negative    Microscopic Examination YES     Urine Type NotGiven    Blood Occult Stool Screen #1   Result Value Ref Range    Occult Blood Screening Result: POSITIVE  Normal range: Negative   (A)    Anti-Xa, Unfractionated Heparin   Result Value Ref Range    Anti-XA Unfrac Heparin 1.21 (HH) 0.30 - 0.70 IU/mL   aPTT   Result Value Ref Range    aPTT 37.1 26.2 - 38.6 sec   Microscopic Urinalysis   Result Value Ref Range    Mucus, UA 3+ (A) None Seen /LPF    WBC, UA 0-2 0 - 5 /HPF    RBC, UA  (A) 0 - 4 /HPF    Renal Epithelial, UA 2-5 (A) 0 - 1 /HPF    Bacteria, UA 1+ (A) None Seen /HPF    Crystals, UA Few Ca. Oxalate (A) None Seen /HPF   EKG 12 Lead   Result Value Ref Range    Ventricular Rate 54 BPM    Atrial Rate 54 BPM    P-R Interval 164 ms    QRS Duration 92 ms    Q-T Interval 422 ms    QTc Calculation (Bazett) 400 ms    P Axis 45 degrees    R Axis 82 degrees    T Axis 39 degrees    Diagnosis       Sinus bradycardiaOtherwise normal ECGWhen compared with ECG of 10-MAY-2019 19:41,No significant change was found   TYPE AND SCREEN   Result Value Ref Range    ABO/Rh A POS     Antibody Screen NEG        RADIOLOGY    CTA ABDOMEN PELVIS W WO CONTRAST    Result Date: 3/6/2022  EXAMINATION: CTA OF THE ABDOMEN AND PELVIS WITH AND WITHOUT CONTRAST 3/6/2022 6:19 pm: TECHNIQUE: CTA of the abdomen and pelvis was performed without and with the administration of intravenous contrast. Multiplanar reformatted images are provided for review. MIP images are provided for review.  Dose modulation, iterative reconstruction, and/or weight based adjustment of the mA/kV was utilized to reduce the radiation dose presented to the ED today with above noted complaints. On my arrival into the room the patient was sitting in the chair, had gone to the bathroom and had more bloody stool. He was sitting and initially fine but then started saying he felt like he was going to pass out. Patient became pale and diaphoretic. We were going to try getting him to the stretcher in the room but he passed out while sitting in the chair. He would come around some, open his eyes, talk a little and than pass out again. This happened about 3 times. We were able to arouse him enough that he could stand to pivot into a bed. Patient had 2 large bore IVs placed per nursing staff. Rectal exam per me showed bright red stool. Patient is on xarelto. GI consult has been ordered and [facility] also ordered. Arrival vital signs: Afebrile, heart rate is 100, BP elevated 120/95. Well saturated on room air. Physical exam performed at [id number]: Patient does have left lower quadrant abdominal tenderness to palpation. Patient is feeling much better and appears much better now that he is in a bed and laying down. Blood work: No leukocytosis, absolute neutrophils elevated at 8.2. No further differential shift. No anemia. No electrolyte abnormality. No evidence of acute kidney injury or transaminitis. Lactic acid levels normal.  Patient's blood type is A+. EKG: Shows sinus bradycardia. UA: With large amount of blood but no evidence of infection. Imaging: CT a of the abdomen and pelvis was obtained with and without contrast per GI protocol and shows findings of mild acute diverticulitis in the distal left colon. Mild adjacent edema in the paracolic gutter. No mesenteric gas. No free intraperitoneal air. No evidence of active gastrointestinal bleeding.     Medications given in the ED:   Medications   prothrombin complex concentrate (human) (KCENTRA) infusion 2,000 Units (0 Units IntraVENous Stopped 3/6/22 1830)     Followed by   0.9 % sodium chloride infusion (has no administration in time range)   ciprofloxacin (CIPRO) IVPB 400 mg (has no administration in time range)   metronidazole (FLAGYL) 500 mg in NaCl 100 mL IVPB premix (has no administration in time range)   0.9 % sodium chloride bolus (0 mLs IntraVENous Stopped 3/6/22 1811)   iopamidol (ISOVUE-370) 76 % injection 75 mL (75 mLs IntraVENous Given 3/6/22 1834)      I do feel the patient requires admission for further evaluation and management of his diverticulitis, left lower quadrant abdominal pain, lower GI bleeding and chronic anticoagulation. Reji Mata will be admitted for further observation and evaluation of Joellen Hernandes's abdominal pain. As I have deemed necessary from their history, physical and studies, I have considered and evaluated Reji Mata for the following diagnoses:  ACUTE APPENDICITIS, BOWEL OBSTRUCTION, CHOLECYSTITIS, DIVERTICULITIS, INCARCERATED HERNIA, PANCREATITIS, or PERFORATED BOWEL or ULCER. The total critical care time I independently spent while evaluating and treating this patient was 43 minutes. This excludes time spent doing separately billable procedures. This includes time at the bedside, data interpretation, medication management, obtaining critical history from collateral sources if the patient is unable to provide it directly, and physician consultation. Specifics of interventions taken and potentially life-threatening diagnostic considerations are listed above in the medical decision making. CLINICAL IMPRESSION    1. Gastrointestinal hemorrhage, unspecified gastrointestinal hemorrhage type    2. On continuous oral anticoagulation    3. Syncope and collapse    4.  Diverticulitis of colon      DISPOSITION  Admit    Comment: Pleasenote this report has been produced using speech recognition software and may contain errors related to that system including errors in grammar, punctuation, and spelling, as well as words and phrases that may beinappropriate. If there are any questions or concerns please feel free to contact the dictating provider for clarification.        GERARD Purvis - MAMADOU  03/06/22 0762

## 2022-03-07 LAB
ANION GAP SERPL CALCULATED.3IONS-SCNC: 12 MMOL/L (ref 3–16)
BASOPHILS ABSOLUTE: 0 K/UL (ref 0–0.2)
BASOPHILS RELATIVE PERCENT: 0.6 %
BUN BLDV-MCNC: 14 MG/DL (ref 7–20)
CALCIUM SERPL-MCNC: 7.8 MG/DL (ref 8.3–10.6)
CHLORIDE BLD-SCNC: 106 MMOL/L (ref 99–110)
CO2: 21 MMOL/L (ref 21–32)
CREAT SERPL-MCNC: 0.6 MG/DL (ref 0.9–1.3)
EKG ATRIAL RATE: 54 BPM
EKG DIAGNOSIS: NORMAL
EKG P AXIS: 45 DEGREES
EKG P-R INTERVAL: 164 MS
EKG Q-T INTERVAL: 422 MS
EKG QRS DURATION: 92 MS
EKG QTC CALCULATION (BAZETT): 400 MS
EKG R AXIS: 82 DEGREES
EKG T AXIS: 39 DEGREES
EKG VENTRICULAR RATE: 54 BPM
EOSINOPHILS ABSOLUTE: 0 K/UL (ref 0–0.6)
EOSINOPHILS RELATIVE PERCENT: 0.7 %
GFR AFRICAN AMERICAN: >60
GFR NON-AFRICAN AMERICAN: >60
GLUCOSE BLD-MCNC: 103 MG/DL (ref 70–99)
HCT VFR BLD CALC: 34.8 % (ref 40.5–52.5)
HCT VFR BLD CALC: 35.2 % (ref 40.5–52.5)
HCT VFR BLD CALC: 35.8 % (ref 40.5–52.5)
HCT VFR BLD CALC: 36.5 % (ref 40.5–52.5)
HEMOGLOBIN: 12.2 G/DL (ref 13.5–17.5)
HEMOGLOBIN: 12.4 G/DL (ref 13.5–17.5)
HEMOGLOBIN: 12.5 G/DL (ref 13.5–17.5)
HEMOGLOBIN: 12.6 G/DL (ref 13.5–17.5)
IRON SATURATION: 26 % (ref 20–50)
IRON: 54 UG/DL (ref 59–158)
LYMPHOCYTES ABSOLUTE: 1 K/UL (ref 1–5.1)
LYMPHOCYTES RELATIVE PERCENT: 19.6 %
MCH RBC QN AUTO: 30.6 PG (ref 26–34)
MCHC RBC AUTO-ENTMCNC: 35.2 G/DL (ref 31–36)
MCV RBC AUTO: 86.8 FL (ref 80–100)
MONOCYTES ABSOLUTE: 0.4 K/UL (ref 0–1.3)
MONOCYTES RELATIVE PERCENT: 8.2 %
NEUTROPHILS ABSOLUTE: 3.8 K/UL (ref 1.7–7.7)
NEUTROPHILS RELATIVE PERCENT: 70.9 %
PDW BLD-RTO: 13.3 % (ref 12.4–15.4)
PLATELET # BLD: 111 K/UL (ref 135–450)
PMV BLD AUTO: 7.5 FL (ref 5–10.5)
POTASSIUM REFLEX MAGNESIUM: 3.8 MMOL/L (ref 3.5–5.1)
RBC # BLD: 4.05 M/UL (ref 4.2–5.9)
SARS-COV-2, NAAT: NOT DETECTED
SODIUM BLD-SCNC: 139 MMOL/L (ref 136–145)
TOTAL IRON BINDING CAPACITY: 210 UG/DL (ref 260–445)
WBC # BLD: 5.3 K/UL (ref 4–11)

## 2022-03-07 PROCEDURE — 36415 COLL VENOUS BLD VENIPUNCTURE: CPT

## 2022-03-07 PROCEDURE — 1200000000 HC SEMI PRIVATE

## 2022-03-07 PROCEDURE — 2580000003 HC RX 258: Performed by: NURSE PRACTITIONER

## 2022-03-07 PROCEDURE — 96366 THER/PROPH/DIAG IV INF ADDON: CPT

## 2022-03-07 PROCEDURE — 80048 BASIC METABOLIC PNL TOTAL CA: CPT

## 2022-03-07 PROCEDURE — 85014 HEMATOCRIT: CPT

## 2022-03-07 PROCEDURE — 6370000000 HC RX 637 (ALT 250 FOR IP): Performed by: NURSE PRACTITIONER

## 2022-03-07 PROCEDURE — 96376 TX/PRO/DX INJ SAME DRUG ADON: CPT

## 2022-03-07 PROCEDURE — 87635 SARS-COV-2 COVID-19 AMP PRB: CPT

## 2022-03-07 PROCEDURE — 83550 IRON BINDING TEST: CPT

## 2022-03-07 PROCEDURE — 96361 HYDRATE IV INFUSION ADD-ON: CPT

## 2022-03-07 PROCEDURE — G0378 HOSPITAL OBSERVATION PER HR: HCPCS

## 2022-03-07 PROCEDURE — 85025 COMPLETE CBC W/AUTO DIFF WBC: CPT

## 2022-03-07 PROCEDURE — C9113 INJ PANTOPRAZOLE SODIUM, VIA: HCPCS | Performed by: NURSE PRACTITIONER

## 2022-03-07 PROCEDURE — 2500000003 HC RX 250 WO HCPCS: Performed by: NURSE PRACTITIONER

## 2022-03-07 PROCEDURE — 83540 ASSAY OF IRON: CPT

## 2022-03-07 PROCEDURE — A4216 STERILE WATER/SALINE, 10 ML: HCPCS | Performed by: NURSE PRACTITIONER

## 2022-03-07 PROCEDURE — 6370000000 HC RX 637 (ALT 250 FOR IP): Performed by: INTERNAL MEDICINE

## 2022-03-07 PROCEDURE — 93010 ELECTROCARDIOGRAM REPORT: CPT | Performed by: INTERNAL MEDICINE

## 2022-03-07 PROCEDURE — 6360000002 HC RX W HCPCS: Performed by: NURSE PRACTITIONER

## 2022-03-07 PROCEDURE — 85018 HEMOGLOBIN: CPT

## 2022-03-07 RX ADMIN — ASPIRIN 81 MG 81 MG: 81 TABLET ORAL at 09:04

## 2022-03-07 RX ADMIN — AMLODIPINE BESYLATE 5 MG: 5 TABLET ORAL at 09:04

## 2022-03-07 RX ADMIN — SODIUM CHLORIDE, PRESERVATIVE FREE 10 ML: 5 INJECTION INTRAVENOUS at 21:13

## 2022-03-07 RX ADMIN — FINASTERIDE 5 MG: 5 TABLET, FILM COATED ORAL at 09:04

## 2022-03-07 RX ADMIN — SODIUM CHLORIDE, PRESERVATIVE FREE 10 ML: 5 INJECTION INTRAVENOUS at 10:10

## 2022-03-07 RX ADMIN — PANTOPRAZOLE SODIUM 40 MG: 40 INJECTION, POWDER, FOR SOLUTION INTRAVENOUS at 09:04

## 2022-03-07 RX ADMIN — CIPROFLOXACIN 400 MG: 2 INJECTION, SOLUTION INTRAVENOUS at 09:09

## 2022-03-07 RX ADMIN — METRONIDAZOLE 500 MG: 500 INJECTION, SOLUTION INTRAVENOUS at 21:16

## 2022-03-07 RX ADMIN — METRONIDAZOLE 500 MG: 500 INJECTION, SOLUTION INTRAVENOUS at 11:45

## 2022-03-07 RX ADMIN — PANTOPRAZOLE SODIUM 40 MG: 40 INJECTION, POWDER, FOR SOLUTION INTRAVENOUS at 21:12

## 2022-03-07 RX ADMIN — BISACODYL 20 MG: 5 TABLET, COATED ORAL at 17:00

## 2022-03-07 RX ADMIN — METRONIDAZOLE 500 MG: 500 INJECTION, SOLUTION INTRAVENOUS at 04:03

## 2022-03-07 RX ADMIN — CIPROFLOXACIN 400 MG: 2 INJECTION, SOLUTION INTRAVENOUS at 22:56

## 2022-03-07 RX ADMIN — ATORVASTATIN CALCIUM 10 MG: 10 TABLET, FILM COATED ORAL at 21:12

## 2022-03-07 RX ADMIN — POLYETHYLENE GLYCOL 3350, SODIUM SULFATE ANHYDROUS, SODIUM BICARBONATE, SODIUM CHLORIDE, POTASSIUM CHLORIDE 2000 ML: 236; 22.74; 6.74; 5.86; 2.97 POWDER, FOR SOLUTION ORAL at 17:00

## 2022-03-07 ASSESSMENT — PAIN SCALES - GENERAL: PAINLEVEL_OUTOF10: 0

## 2022-03-07 NOTE — PROGRESS NOTES
Consult request received and appreciated  Full note/eval to follow    Kolby Weinstein MD       (O) [phone]

## 2022-03-07 NOTE — PROGRESS NOTES
Hospitalist Progress Note      PCP: Link Bailon MD    Date of Admission: 3/6/2022    Chief Complaint: LLQ pain and [facility] Course: History and physical reviewed    Subjective: No UT bleed, abdominal pain, hematemesis or change in mental status  No  ,syncope, shortness of breath or chest pain  Mild left lower quadrant pain      Medications:  Reviewed    Infusion Medications    sodium chloride      sodium chloride      sodium chloride 75 mL/hr at 03/06/22 3889     Scheduled Medications    amLODIPine  5 mg Oral Daily    aspirin  81 mg Oral Daily    atorvastatin  10 mg Oral Nightly    finasteride  5 mg Oral Daily    sodium chloride flush  5-40 mL IntraVENous 2 times per day    pantoprazole  40 mg IntraVENous Q12H    And    sodium chloride (PF)  10 mL IntraVENous Q12H    nicotine  1 patch TransDERmal Daily    ciprofloxacin  400 mg IntraVENous Q12H    metroNIDAZOLE  500 mg IntraVENous Q8H     PRN Meds: sodium chloride flush, sodium chloride, ondansetron **OR** ondansetron, acetaminophen **OR** acetaminophen      Intake/Output Summary (Last 24 hours) at 3/7/2022 0924  Last data filed at 3/7/2022 7189  Gross per 24 hour   Intake 1787.15 ml   Output --   Net 1787.15 ml       Physical Exam Performed:    /68   Pulse 65   Temp 97.9 °F (36.6 °C) (Oral)   Resp 16   Ht 5' 8\" (1.727 m)   Wt 235 lb (106.6 kg)   SpO2 95%   BMI 35.73 kg/m²     General appearance: No apparent distress, appears stated age and cooperative. Obese  HEENT: P. Conjunctivae/corneas clear. Neck: Supple, with full range of motion. No jugular venous distention. Trachea midline. Respiratory:  Normal respiratory effort. Clear to auscultation, bilaterally without Rales/Wheezes/Rhonchi. Cardiovascular: Regular rate and rhythm with normal S1/S2 without murmurs, rubs or gallops. Abdomen: Soft, non-tender, non-distended with normal bowel sounds. Obese  Musculoskeletal: No clubbing, cyanosis or edema bilaterally.   Full range of motion without deformity. Skin: Skin color, texture, turgor normal.  No rashes or lesions. Neurologic:  Neurovascularly intact without any focal sensory/motor deficits. Psychiatric: Alert and oriented, thought content appropriate, normal insight  Capillary Refill: Brisk,3 seconds, normal   Peripheral Pulses: +2 palpable, equal bilaterally       Labs:   Recent Labs     03/06/22 1708 03/06/22 1708 03/06/22 2038 03/07/22  0255 03/07/22  0807   WBC 10.4  --   --  5.3  --    HGB 15.4   < > 13.5 12.4* 12.2*   HCT 44.5   < > 38.4* 35.2* 34.8*     --   --  111*  --     < > = values in this interval not displayed. Recent Labs     03/06/22 1708 03/07/22 0255    139   K 3.7 3.8    106   CO2 21 21   BUN 12 14   CREATININE 0.7* 0.6*   CALCIUM 9.2 7.8*     Recent Labs     03/06/22 1708   AST 15   ALT 19   BILITOT 0.9   ALKPHOS 82     No results for input(s): INR in the last 72 hours. No results for input(s): Ethelene Soulier in the last 72 hours. Urinalysis:      Lab Results   Component Value Date    NITRU Negative 03/06/2022    WBCUA 0-2 03/06/2022    BACTERIA 1+ 03/06/2022    RBCUA  03/06/2022    BLOODU LARGE 03/06/2022    SPECGRAV 1.020 03/06/2022    GLUCOSEU Negative 03/06/2022       Radiology:  CTA ABDOMEN PELVIS W WO CONTRAST   Final Result   1. Findings of mild acute diverticulitis in the distal left colon. Mild   adjacent edema in the paracolic gutter. No mesenteric gas. No free   intraperitoneal air. Follow-up recommended to confirm resolution. 2. No evidence of active gastrointestinal bleeding. Assessment/Plan:    Active Hospital Problems    Diagnosis     GI bleed [K92.2]     BRBPR (bright red blood per rectum) [K62.5]     Tobacco abuse [Z72.0]     Obesity (BMI 30-39. 9) [E66.9]     Coronary artery disease [I25.10]      LLQ pain and BRBPR in patient anticoagulated on xarelto for antiphospholipid syndrome-laminar thrombus within the

## 2022-03-07 NOTE — PROGRESS NOTES
At the request of Dr. Neri Fall, Dr. Cassandra Gary was notified that pt has antiphospholipid syndrome and we are currently holding his xarelto. If procedure is warranted, sooner rather than later would be best so that [facility] can be restarted. Dr. Cassandra Gary to round on patient this afternoon. Plans for colonoscopy in the AM once bowel prep has been completed. Ok to order clear liquid diet.

## 2022-03-07 NOTE — H&P
Hospital Medicine History & Physical      PCP: Sri Nowak MD    Date of Admission: 3/6/2022    Date of Service: Pt seen/examined on 3/6/2022 and Admitted to Inpatient with expected LOS greater than two midnights due to medical therapy. Chief Complaint:  LLQ pain and BRBPR    History Of Present Illness:      63 y.o. male, with PMH of Anti phospholipid syndrome, HTN, HLD, CAD, tobacco use and obesity, who presented to [facility] with LLQ pain and BRBPR. History obtained from the patient and review of EMR. The patient stated he has been experiencing LLQ pain for the last few days. He denies any associated nausea and vomiting. He stated he thought the pain was a \"fullness\" from drinking too much water. However, The patient stated this afternoon he went to the bathroom and had a large bright red bloody bowel movement. He stated he had a syncopal episode after seeing the blood so he went to the ED for further evaluation. The patient stated he was told roughly five years ago that he had diverticulitis. He also stated he is on xarelto for antiphospholipid syndrome. In the ED a CTA abdomen pelvis was obtained that revealed mild acute diverticulitis in distal left colon. Mild adjacent edema in the paracolic gutter. No evidence of active GI bleed. His H/H is stable at 15.4/44. 5. the patient was given cipro, flagyl and a 1 L NS bolus. GI was consulted and will see patient in the am. He will be admitted for further evaluation and treatment. Type and screen ordered. The patient denied any other associated symptoms as well as any aggravating and/or alleviating factors. At the time of this assessment, the patient was resting comfortably in bed. He currently denies any chest pain, back pain, abdominal pain, shortness of breath, numbness, tingling, N/V/C/D, fever and/or chills.      Past Medical History:          Diagnosis Date    [facility]iphosph[facility] [address] [address]     Blood clotting disorder ([facility] [address]     Coronary artery disease 4/23/2014    Dr. Lilly Purdy hematology and Dr. Anahi Gibson Cardiology    Hyperlipidemia     DEE on CPAP     Osteoarthritis of right hip     Primary osteoarthritis of left hip 10/9/2017     Past Surgical History:          Procedure Laterality Date    CARDIAC CATHETERIZATION  2014    COLONOSCOPY      COLONOSCOPY  5/2/16    colon polyp, biopsy ascending lesion    DENTAL SURGERY  2015    FRACTURE SURGERY Right 2004    tib-fib    JOINT REPLACEMENT Left 2018    left hip    JOINT REPLACEMENT Right 08/2020    SKIN BIOPSY       Medications Prior to Admission:      Prior to Admission medications    Medication Sig Start Date End Date Taking? Authorizing Provider   finasteride (PROSCAR) 5 MG tablet Take 5 mg by mouth daily   Yes Historical Provider, MD   atorvastatin (LIPITOR) 10 MG tablet TAKE 1 TABLET EVERY NIGHT AT BEDTIME 9/14/21  Yes Alonzo Adkins MD   amLODIPine (NORVASC) 5 MG tablet TAKE 1 TABLET EVERY DAY 9/14/21  Yes Alonzo Adkins MD   rivaroxaban (XARELTO) 20 MG TABS tablet TAKE 1 TABLET EVERY DAY 9/14/21  Yes Alonzo Adkins MD   aspirin 81 MG chewable tablet CHEW 1 TABLET DAILY 8/31/18  Yes Rafael Dawkins MD   nitroGLYCERIN (NITROSTAT) 0.4 MG SL tablet Place 1 tablet under the tongue every 5 minutes as needed for Chest pain. 4/8/14   Rafael Dawkins MD     Allergies:  Lorazepam    Social History:      The patient currently lives at home    TOBACCO:   reports that he has been smoking cigarettes. He started smoking about 42 years ago. He has a 15.00 pack-year smoking history. He has never used smokeless tobacco.  ETOH:   reports no history of alcohol use. E-Cigarettes/Vaping Use     Questions Responses    E-Cigarette/Vaping Use Never User    Start Date     Passive Exposure     Quit Date     Counseling Given     Comments         Family History:      Reviewed in detail.  Positive as follows:        Problem Relation Age of Onset    Cancer Mother     High Cholesterol Father REVIEW OF SYSTEMS COMPLETED:   Pertinent positives as noted in the HPI. All other systems reviewed and negative. PHYSICAL EXAM PERFORMED:    /70   Pulse 61   Temp 98.2 °F (36.8 °C)   Resp 20   Ht 5' 8\" (1.727 m)   Wt 235 lb (106.6 kg)   SpO2 95%   BMI 35.73 kg/m²     General appearance:  Pleasant, obese male in no apparent distress, appears stated age and cooperative. HEENT:  Pupils equal, round, and reactive to light. Extra ocular muscles intact. Conjunctivae/corneas clear. Neck: Supple, with full range of motion. No jugular venous distention. Trachea midline. Respiratory:  Normal respiratory effort. Clear to auscultation, bilaterally without Rales/Wheezes/Rhonchi. Cardiovascular:  Regular rate and rhythm with normal S1/S2 without murmurs, rubs or gallops. Abdomen: Soft, obese, round  tender, non-distended with normal bowel sounds. Musculoskeletal:  No clubbing, cyanosis or edema bilaterally. Full range of motion without deformity. Skin: Skin color, texture, turgor normal.  No significant rashes or lesions. Neurologic:  Neurovascularly intact.  Cranial nerves: II-XII intact, grossly non-focal.  Psychiatric:  Alert and oriented, thought content appropriate, normal insight  Capillary Refill: Brisk,3 seconds, normal  Peripheral Pulses: +2 palpable, equal bilaterally     Labs:     Recent Labs     03/06/22  1708   WBC 10.4   HGB 15.4   HCT 44.5        Recent Labs     03/06/22  1708      K 3.7      CO2 21   BUN 12   CREATININE 0.7*   CALCIUM 9.2     Recent Labs     03/06/22  1708   AST 15   ALT 19   BILITOT 0.9   ALKPHOS 82     Urinalysis:      Lab Results   Component Value Date    NITRU Negative 03/06/2022    WBCUA 0-2 03/06/2022    BACTERIA 1+ 03/06/2022    RBCUA  03/06/2022    BLOODU LARGE 03/06/2022    SPECGRAV 1.020 03/06/2022    GLUCOSEU Negative 03/06/2022     Radiology:     CXR: I have reviewed the CXR with the following interpretation: N/a    EKG:  I have reviewed the EKG with the following interpretation: The Ekg interpreted in the absence of a cardiologist shows Sinus bradycardia. Otherwise normal ECG. When compared with ECG of 10-MAY-2019 19:41, No significant change was found    CTA ABDOMEN PELVIS W WO CONTRAST   Preliminary Result   1. Findings of mild acute diverticulitis in the distal left colon. Mild   adjacent edema in the paracolic gutter. No mesenteric gas. No free   intraperitoneal air. Follow-up recommended to confirm resolution. 2. No evidence of active gastrointestinal bleeding. ASSESSMENT:    Active Hospital Problems    Diagnosis Date Noted    GI bleed [K92.2] 03/06/2022    Tobacco abuse [Z72.0] 09/10/2019    Obesity (BMI 30-39. 9) [E66.9] 03/20/2017    Coronary artery disease [I25.10] 04/23/2014     PLAN:    LLQ pain and BRBPR in patient anticoagulated on xarelto for antiphospholipid syndrome  -CTA abdomen pelvis revealed: mild acute diverticulitis in distal left colon. Mild adjacent edema in the paracolic gutter. No evidence of active GI bleed  -H/H stable at this time  -stool occult positive  -cipro and flagyl initiated in ED and continued 3/6/2022  -K centra given in ED  -1 L NS given in ED  -Q6 hour H/H  -GI consulted in ED - appreciate recommendations in advance  -Hold xarelto  -nursing to document all stool for color and consistency  -NPO x for sips of clear liquids  -tele monitoring  -type and screen  -MIVF  -IVP protonix    Obesity  With Body mass index is 35.7 kg/m². Complicating assessment and treatment. Placing patient at risk for multiple co-morbidities as well as early death and contributing to the patient's presentation.  Counseled on weight loss    Essential HTN in setting of known CAD  -continue amlodipine  -continue ASA    HLD  -continue atorvastatin    Tobacco use  -tobacco cessation  -nicotine patch    DVT Prophylaxis: SCD's    Diet: No diet orders on file     Code Status: Prior    PT/OT Eval Status: No indication for need at this time    Dispo - 2-3 days pending clinical improvement     Rubin Godoy, APRN - CNP    Thank you Janna Salazar MD for the opportunity to be involved in this patient's care.  If you have any questions or concerns please feel free to contact me at [phone].  -------------------------Anticipated Dr. Reggie cleveland-------------------------------

## 2022-03-07 NOTE — CONSULTS
Consult placed    Christelle:kevon  Date:3/6/2022,  Time:9:36 PM        Electronically signed by Carmelo Ace on 3/6/2022 at 9:36 PM
Signs:   Temp (24hrs), Av °F (36.7 °C), Min:97.8 °F (36.6 °C), Max:98.2 °F (65.3 °C)     Systolic (51EMH), GGI:701 , Min:96 , CMV:731      Diastolic (55KOW), JUAN PABLO:11, Min:58, Max:95     Pulse  Av.8  Min: 54  Max: 100  BP (!) 96/58   Pulse 55   Temp 97.8 °F (36.6 °C) (Oral)   Resp 16   Ht 5' 8\" (1.727 m)   Wt 235 lb (106.6 kg)   SpO2 92%   BMI 35.73 kg/m²      Physical Exam:   /70   Pulse 63   Temp 97.6 °F (36.4 °C) (Oral)   Resp 18   Ht 5' 8\" (1.727 m)   Wt 235 lb (106.6 kg)   SpO2 95%   BMI 35.73 kg/m²   General appearance: alert, appears stated age and cooperative  Lungs: clear to auscultation bilaterally  Chest wall: no tenderness  Heart: regular rate and rhythm, S1, S2 normal, no murmur, click, rub or gallop  Abdomen: soft, non-tender; bowel sounds normal; no masses,  no organomegaly  Extremities: extremities normal, atraumatic, no cyanosis or edema  Skin: Skin color, texture, turgor normal. No rashes or lesions  Neurologic: Grossly normal    Lab and Imaging Review   Recent Labs     22  1708 22  0255   WBC 10.4  --  5.3   HGB 15.4 13.5 12.4*   MCV 85.8  --  86.8     --  111*     --  139   K 3.7  --  3.8     --  106   CO2 21  --  21   BUN 12  --  14   CREATININE 0.7*  --  0.6*   GLUCOSE 120*  --  103*   CALCIUM 9.2  --  7.8*   PROT 6.4  --   --    LABALBU 4.6  --   --    AST 15  --   --    ALT 19  --   --    ALKPHOS 82  --   --    BILITOT 0.9  --   --        Assessment:     Patient Active Problem List    Diagnosis Date Noted    GI bleed 2022    BRBPR (bright red blood per rectum) 2022    Ascending aorta dilatation ([address] 2020    Tobacco abuse 09/10/2019    Primary osteoarthritis of left hip 10/09/2017    Obesity (BMI 30-39.9) 2017    Moderate obstructive sleep apnea 2016    Antiphospholipid antibody positive 10/08/2015    History of MTHFR mutation 10/02/2015    Coronary artery disease 2014     59

## 2022-03-07 NOTE — ED PROVIDER NOTES
Patient was seen and evaluated by myself in conjunction with nurse practitioner. History and physical exam were independently performed on Jessie Arcos. All diagnostic, treatment, and disposition decisions were made by myself in conjunction with nurse practitioner. Please see note completed by Johana Ndiaye NP for full details of patient's visit. Patient presents to the emergency department complaining of abdominal pain. Patient reports that he began having left lower abdominal pain 2 to 3 days ago. Patient also reports that he had a bloody bowel movement with bright red blood. Patient denies nausea, vomiting, or coffee-ground emesis. Remote history of diverticulitis noted. Physical exam: General: No acute distress. Head: Normocephalic/atraumatic. Heart: Regular rate and rhythm peer normal S I-II. Lungs: Clear to auscultation bilaterally. Wheezes, rales, rhonchi. Abdomen: Left lower quadrant tenderness to palpation. Voluntary guarding without rebound tenderness noted. Normal bowel sounds. Rectal exam performed by nurse practitioner. EKG: Sinus bradycardia with a rate of 54. Normal axis. Normal intervals and durations. No ST or T wave changes. No acute signs of ischemia. EKG is essentially unchanged for greater previous EKG on 7/23/2020              Rechecks: Physical assessment performed. Shortly after arrival, patient had syncopal episode and was moved to room 3.    1745: Patient resting comfortably. 1848: Vital signs stable. Critical Care: Critical care 35 minutes was completed on patient in addition to and excluding procedures noted secondary to concerns for acute GI bleed and potential for decompensation. GI consulted. Will follow in house. ED COURSE/MDM  Patient seen and evaluated. Old records reviewed. Labs and imaging reviewed and results discussed with patient. Patient was given saline and Protonix in the ED with good symptomatic relief. Patient was reassessed as noted above . Plan of care discussed with patient and family. Patient and family in agreement with plan. Patient was given scripts for the following medications. I counseled patient how to take these medications. New Prescriptions    No medications on file       CLINICAL IMPRESSION  1. Gastrointestinal hemorrhage, unspecified gastrointestinal hemorrhage type    2. On continuous oral anticoagulation    3. Syncope and collapse        Blood pressure 109/70, pulse 61, temperature 98.2 °F (36.8 °C), resp. rate 20, height 5' 8\" (1.727 m), weight 235 lb (106.6 kg), SpO2 95 %. DISPOSITION  Reji Mata was admitted in stable condition.          Alycia Law DO  03/06/22 8624

## 2022-03-07 NOTE — PROGRESS NOTES
Pt transported to 338 from ED. Alert and oriented, oriented to room/call light. VSS, room air. Assessment completed as charted. Pt up to bathroom with standby assist, denies dizziness/lightheadedness at present time. Pt did have episode of BRBPR. Denies any pain at present time. Resting in bed, denies needs. Call light and bedside table within reach. Bed locked and in lowest position.

## 2022-03-07 NOTE — CARE COORDINATION
CASE MANAGEMENT INITIAL ASSESSMENT      Reviewed chart and completed assessment with patient:bedside  Explained Case Management role/services. Primary contact information:Carol    [facility] Decision Maker :   Primary Decision Maker: Carol Hernandes - Spouse - [phone]          Can this person be reached and be able to respond quickly, such as within a few minutes or hours? Yes    Admit date/status:3/6/22  Diagnosis:syncope and collapse   Is this a Readmission?:  No      [facility]   Precert required for SNF: Yes       3 night stay required: No    Living arrangements, Adls, care needs, prior to admission:lives in 2 story home with wife and 3 kids. 96,56,24    [address] at home:  Walker__Cane__RTS__ BSC__Shower Chair__  02__ HHN__ CPAP__  BiPap__  Hospital Bed__ W/C___ Other__________    Services in the home and/or outpatient, prior to [address] Notification (HEN):needed for SNF    Barriers to discharge:none    Plan/comments:spoke with patient. Plans on returning home at discharge. Reported IPTA. Denied any DCP needs.  Harvinder Brewer, RN       ECOC on chart for MD signature

## 2022-03-07 NOTE — ED NOTES
Hospitalist NP in room. Patient to be taken up after. Report has been called to the floor.      Musa Gibson RN  03/06/22 2043 LOV 11/27/21. Psoriasis. Please advisekecia

## 2022-03-08 ENCOUNTER — ANESTHESIA (OUTPATIENT)
Dept: ENDOSCOPY | Age: 62
DRG: 378 | End: 2022-03-08
Payer: COMMERCIAL

## 2022-03-08 ENCOUNTER — ANESTHESIA EVENT (OUTPATIENT)
Dept: ENDOSCOPY | Age: 62
DRG: 378 | End: 2022-03-08
Payer: COMMERCIAL

## 2022-03-08 VITALS — SYSTOLIC BLOOD PRESSURE: 100 MMHG | DIASTOLIC BLOOD PRESSURE: 60 MMHG | OXYGEN SATURATION: 98 %

## 2022-03-08 VITALS
OXYGEN SATURATION: 94 % | HEIGHT: 68 IN | WEIGHT: 235 LBS | HEART RATE: 55 BPM | SYSTOLIC BLOOD PRESSURE: 109 MMHG | RESPIRATION RATE: 18 BRPM | DIASTOLIC BLOOD PRESSURE: 72 MMHG | BODY MASS INDEX: 35.61 KG/M2 | TEMPERATURE: 97.3 F

## 2022-03-08 LAB
HCT VFR BLD CALC: 34.6 % (ref 40.5–52.5)
HCT VFR BLD CALC: 35.1 % (ref 40.5–52.5)
HEMOGLOBIN: 12 G/DL (ref 13.5–17.5)
HEMOGLOBIN: 12.3 G/DL (ref 13.5–17.5)

## 2022-03-08 PROCEDURE — 36415 COLL VENOUS BLD VENIPUNCTURE: CPT

## 2022-03-08 PROCEDURE — 7100000010 HC PHASE II RECOVERY - FIRST 15 MIN: Performed by: INTERNAL MEDICINE

## 2022-03-08 PROCEDURE — G0378 HOSPITAL OBSERVATION PER HR: HCPCS

## 2022-03-08 PROCEDURE — 7100000011 HC PHASE II RECOVERY - ADDTL 15 MIN: Performed by: INTERNAL MEDICINE

## 2022-03-08 PROCEDURE — 3700000001 HC ADD 15 MINUTES (ANESTHESIA): Performed by: INTERNAL MEDICINE

## 2022-03-08 PROCEDURE — 0DJD8ZZ INSPECTION OF LOWER INTESTINAL TRACT, VIA NATURAL OR ARTIFICIAL OPENING ENDOSCOPIC: ICD-10-PCS | Performed by: INTERNAL MEDICINE

## 2022-03-08 PROCEDURE — 2580000003 HC RX 258: Performed by: NURSE ANESTHETIST, CERTIFIED REGISTERED

## 2022-03-08 PROCEDURE — 2709999900 HC NON-CHARGEABLE SUPPLY: Performed by: INTERNAL MEDICINE

## 2022-03-08 PROCEDURE — 3609027000 HC COLONOSCOPY: Performed by: INTERNAL MEDICINE

## 2022-03-08 PROCEDURE — 6360000002 HC RX W HCPCS: Performed by: NURSE ANESTHETIST, CERTIFIED REGISTERED

## 2022-03-08 PROCEDURE — 96366 THER/PROPH/DIAG IV INF ADDON: CPT

## 2022-03-08 PROCEDURE — 2500000003 HC RX 250 WO HCPCS: Performed by: NURSE ANESTHETIST, CERTIFIED REGISTERED

## 2022-03-08 PROCEDURE — 6360000002 HC RX W HCPCS: Performed by: NURSE PRACTITIONER

## 2022-03-08 PROCEDURE — 3700000000 HC ANESTHESIA ATTENDED CARE: Performed by: INTERNAL MEDICINE

## 2022-03-08 PROCEDURE — 96376 TX/PRO/DX INJ SAME DRUG ADON: CPT

## 2022-03-08 PROCEDURE — C9113 INJ PANTOPRAZOLE SODIUM, VIA: HCPCS | Performed by: NURSE PRACTITIONER

## 2022-03-08 PROCEDURE — 2500000003 HC RX 250 WO HCPCS: Performed by: NURSE PRACTITIONER

## 2022-03-08 PROCEDURE — 6370000000 HC RX 637 (ALT 250 FOR IP): Performed by: NURSE PRACTITIONER

## 2022-03-08 PROCEDURE — 85014 HEMATOCRIT: CPT

## 2022-03-08 PROCEDURE — 85018 HEMOGLOBIN: CPT

## 2022-03-08 RX ORDER — LIDOCAINE HYDROCHLORIDE 20 MG/ML
INJECTION, SOLUTION EPIDURAL; INFILTRATION; INTRACAUDAL; PERINEURAL PRN
Status: DISCONTINUED | OUTPATIENT
Start: 2022-03-08 | End: 2022-03-08 | Stop reason: SDUPTHER

## 2022-03-08 RX ORDER — PROPOFOL 10 MG/ML
INJECTION, EMULSION INTRAVENOUS PRN
Status: DISCONTINUED | OUTPATIENT
Start: 2022-03-08 | End: 2022-03-08 | Stop reason: SDUPTHER

## 2022-03-08 RX ORDER — CIPROFLOXACIN 500 MG/1
500 TABLET, FILM COATED ORAL 2 TIMES DAILY
Qty: 20 TABLET | Refills: 0 | Status: SHIPPED | OUTPATIENT
Start: 2022-03-08 | End: 2022-03-18

## 2022-03-08 RX ORDER — SODIUM CHLORIDE, SODIUM LACTATE, POTASSIUM CHLORIDE, CALCIUM CHLORIDE 600; 310; 30; 20 MG/100ML; MG/100ML; MG/100ML; MG/100ML
INJECTION, SOLUTION INTRAVENOUS CONTINUOUS PRN
Status: DISCONTINUED | OUTPATIENT
Start: 2022-03-08 | End: 2022-03-08 | Stop reason: SDUPTHER

## 2022-03-08 RX ORDER — METRONIDAZOLE 500 MG/1
500 TABLET ORAL 3 TIMES DAILY
Qty: 30 TABLET | Refills: 0 | Status: SHIPPED | OUTPATIENT
Start: 2022-03-08 | End: 2022-03-18

## 2022-03-08 RX ADMIN — PROPOFOL 200 MG: 10 INJECTION, EMULSION INTRAVENOUS at 10:49

## 2022-03-08 RX ADMIN — LIDOCAINE HYDROCHLORIDE 100 MG: 20 INJECTION, SOLUTION EPIDURAL; INFILTRATION; INTRACAUDAL; PERINEURAL at 10:49

## 2022-03-08 RX ADMIN — SODIUM CHLORIDE, SODIUM LACTATE, POTASSIUM CHLORIDE, AND CALCIUM CHLORIDE: .6; .31; .03; .02 INJECTION, SOLUTION INTRAVENOUS at 10:47

## 2022-03-08 RX ADMIN — PANTOPRAZOLE SODIUM 40 MG: 40 INJECTION, POWDER, FOR SOLUTION INTRAVENOUS at 07:34

## 2022-03-08 RX ADMIN — METRONIDAZOLE 500 MG: 500 INJECTION, SOLUTION INTRAVENOUS at 11:50

## 2022-03-08 RX ADMIN — FINASTERIDE 5 MG: 5 TABLET, FILM COATED ORAL at 07:41

## 2022-03-08 RX ADMIN — CIPROFLOXACIN 400 MG: 2 INJECTION, SOLUTION INTRAVENOUS at 07:36

## 2022-03-08 RX ADMIN — METRONIDAZOLE 500 MG: 500 INJECTION, SOLUTION INTRAVENOUS at 03:30

## 2022-03-08 ASSESSMENT — LIFESTYLE VARIABLES: SMOKING_STATUS: 1

## 2022-03-08 ASSESSMENT — PAIN SCALES - GENERAL: PAINLEVEL_OUTOF10: 0

## 2022-03-08 NOTE — ANESTHESIA POSTPROCEDURE EVALUATION
Department of Anesthesiology  Postprocedure Note    Patient: Elvin Tovar  MRN: [id number]  YOB: 1960  Date of evaluation: 3/8/2022  Time:  12:56 PM     Procedure Summary     Date: 03/08/22 Room / Location: [facility]    Anesthesia Start: 1898 Anesthesia Stop: 1108    Procedure: COLONOSCOPY DIAGNOSTIC (N/A ) Diagnosis: (LLQ pain and BRBPR)    Surgeons: Merlin Stephens MD Responsible Provider: Sarmad Thao MD    Anesthesia Type: MAC ASA Status: 3          Anesthesia Type: MAC    Levi Phase I: Levi Score: 10    Levi Phase II: Levi Score: 10    Last vitals: Reviewed and per EMR flowsheets.        Anesthesia Post Evaluation    Patient location during evaluation: PACU  Patient participation: complete - patient participated  Level of consciousness: awake and alert  Pain score: 0  Airway patency: patent  Nausea & Vomiting: no nausea and no vomiting  Complications: no  Cardiovascular status: blood pressure returned to baseline  Respiratory status: acceptable  Hydration status: stable

## 2022-03-08 NOTE — H&P
Pre-sedation Assessment    History and Physical / Pre-Sedation Assessment  Patient:  Brandin Mcneil   :   1960     Intended Procedure: colonoscopy      HPI: 63 y. o. male, with PMH of Anti phospholipid syndrome on Xarelto, HTN, HLD, CAD, tobacco use and obesity, who presented with LLQ pain and BRBPR. H/H .   Had a colonoscopy in 2016 Kailee Spicer) w polyps and diverticulosis.     Current Facility-Administered Medications   Medication Dose Route Frequency Provider Last Rate Last Admin    0.9 % sodium chloride infusion  50 mL IntraVENous Once Bhaskar Ratliff, APRN - CNP        amLODIPine (NORVASC) tablet 5 mg  5 mg Oral Daily Sherl Crease, APRN - CNP   5 mg at 22 61    aspirin chewable tablet 81 mg  81 mg Oral Daily Sherl Crease, APRN - CNP   81 mg at 22 09    atorvastatin (LIPITOR) tablet 10 mg  10 mg Oral Nightly Sherl Crease, APRN - CNP   10 mg at 22    finasteride (PROSCAR) tablet 5 mg  5 mg Oral Daily Sherl Crease, APRN - CNP   5 mg at 22 07    sodium chloride flush 0.9 % injection 5-40 mL  5-40 mL IntraVENous 2 times per day Sherl Crease, APRN - CNP   10 mL at 22    sodium chloride flush 0.9 % injection 5-40 mL  5-40 mL IntraVENous PRN Sherl Crease, APRN - CNP        0.9 % sodium chloride infusion  25 mL IntraVENous PRN Sherl Crease, APRN - CNP        ondansetron (ZOFRAN-ODT) disintegrating tablet 4 mg  4 mg Oral Q8H PRN Sherl Crease, APRN - CNP        Or    ondansetron (ZOFRAN) injection 4 mg  4 mg IntraVENous Q6H PRN Sherl Crease, APRN - CNP        acetaminophen (TYLENOL) tablet 650 mg  650 mg Oral Q6H PRN Sherl Crease, APRN - CNP        Or    acetaminophen (TYLENOL) suppository 650 mg  650 mg Rectal Q6H PRN Sherl Crease, APRN - CNP        pantoprazole (PROTONIX) injection 40 mg  40 mg IntraVENous Q12H Sherl Crease, APRN - CNP   40 mg at 22 0734    And    sodium chloride (PF) 0.9 % injection 10 mL  10 mL IntraVENous Q12H GERARD Savage - CNP   10 mL at 03/07/22 2113    nicotine (NICODERM CQ) 21 MG/24HR 1 patch  1 patch TransDERmal Daily Odalis Kebede, APRN - CNP        ciprofloxacin (CIPRO) IVPB 400 mg  400 mg IntraVENous Q12H Ledora Hutchinson, APRN - CNP   Stopped at 03/08/22 0920    metronidazole (FLAGYL) 500 mg in NaCl 100 mL IVPB premix  500 mg IntraVENous Q8H Ledora Chidi, APRN - CNP   Stopped at 03/08/22 0445     Past Medical History:   Diagnosis Date    [facility]iphospholipid syndrome ([facility] [address]     Blood clotting disorder ([facility] [address]     Coronary artery disease 4/23/2014    Dr. Linda Guerra hematology and Dr. Mark Lovell Cardiology    Hyperlipidemia     DEE on CPAP     Osteoarthritis of right hip     Primary osteoarthritis of left hip 10/9/2017     Past Surgical History:   Procedure Laterality Date    CARDIAC CATHETERIZATION  2014    COLONOSCOPY      COLONOSCOPY  5/2/16    colon polyp, biopsy ascending lesion    DENTAL SURGERY  2015    FRACTURE SURGERY Right 2004    tib-fib    JOINT REPLACEMENT Left 2018    left hip    JOINT REPLACEMENT Right 08/2020    SKIN BIOPSY         Nurses notes reviewed and agreed. Medications reviewed  Allergies: Allergies   Allergen Reactions    Lorazepam      Used as a pre-op with L hip Repl, had an anxiety rx           Physical Exam:  Vital Signs: /62   Pulse 68   Temp 97.8 °F (36.6 °C) (Oral)   Resp 18   Ht 5' 8\" (1.727 m)   Wt 235 lb (106.6 kg)   SpO2 92%   BMI 35.73 kg/m²  Body mass index is 35.73 kg/m². Airway:Normal  Cardiac:Normal  Pulmonary:Normal  Abdomen:Normal  Specific to procedure: none      Pre-Procedure Assessment/Plan:  ASA 3 - Patient with moderate systemic disease with functional limitations  Mallampati II  Level of Sedation Plan:Deep sedation    Post Procedure plan: Return to same level of care    I assessed the patient and find that the patient is in satisfactory condition to proceed with the planned procedure and sedation plan.     I have explained the risk, benefits, and alternatives to the procedure. The patient understands and agrees to proceed.   Yes    Colten Batista MD       ([phone]) [phone]          Colten Batista MD  10:49 AM 3/8/2022

## 2022-03-08 NOTE — PROGRESS NOTES
Pt assessment completed and charted. VSS. Pt a/o x4. Pt is finishing bowel prep. IV abx infusing. Medications given per MAR. Pt independent on ambulation. Consent singed and placed in chart for colonoscopy. Bed in lowest position and wheels locked. Call light within reach. Bedside table within reach. Non-skid footwear in place. Pt denies any other needs at this time. Pt calls out appropriately. Will continue to monitor.

## 2022-03-08 NOTE — PROGRESS NOTES
Hospitalist Progress Note      PCP: Tre Méndez MD    Date of Admission: 3/6/2022    Chief Complaint: LLQ pain and [facility] Course: History and physical reviewed    Subjective: No VA bleed, abdominal pain, hematemesis or change in mental status  No  ,syncope, shortness of breath or chest pain  Mild left lower quadrant pain      Medications:  Reviewed    Infusion Medications    sodium chloride      sodium chloride       Scheduled Medications    amLODIPine  5 mg Oral Daily    aspirin  81 mg Oral Daily    atorvastatin  10 mg Oral Nightly    finasteride  5 mg Oral Daily    sodium chloride flush  5-40 mL IntraVENous 2 times per day    pantoprazole  40 mg IntraVENous Q12H    And    sodium chloride (PF)  10 mL IntraVENous Q12H    nicotine  1 patch TransDERmal Daily    ciprofloxacin  400 mg IntraVENous Q12H    metroNIDAZOLE  500 mg IntraVENous Q8H     PRN Meds: sodium chloride flush, sodium chloride, ondansetron **OR** ondansetron, acetaminophen **OR** acetaminophen      Intake/Output Summary (Last 24 hours) at 3/8/2022 0736  Last data filed at 3/8/2022 0458  Gross per 24 hour   Intake 1176.56 ml   Output    Net 1176.56 ml       Physical Exam Performed:    /62   Pulse 68   Temp 97.8 °F (36.6 °C) (Oral)   Resp 18   Ht 5' 8\" (1.727 m)   Wt 235 lb (106.6 kg)   SpO2 92%   BMI 35.73 kg/m²     General appearance: No apparent distress, appears stated age and cooperative. Obese  HEENT: P. Conjunctivae/corneas clear. Neck: Supple, with full range of motion. No jugular venous distention. Trachea midline. Respiratory:  Normal respiratory effort. Clear to auscultation, bilaterally without Rales/Wheezes/Rhonchi. Cardiovascular: Regular rate and rhythm with normal S1/S2 without murmurs, rubs or gallops. Abdomen: Soft, non-tender, non-distended with normal bowel sounds. Obese  Musculoskeletal: No clubbing, cyanosis or edema bilaterally. Full range of motion without deformity.   Skin: Skin color, texture, turgor normal.  No rashes or lesions. Neurologic:  Neurovascularly intact without any focal sensory/motor deficits. Psychiatric: Alert and oriented, thought content appropriate, normal insight  Capillary Refill: Brisk,3 seconds, normal   Peripheral Pulses: +2 palpable, equal bilaterally       Labs:   Recent Labs     03/06/22 1708 03/06/22 2038 03/07/22  0255 03/07/22  0807 03/07/22  1606 03/07/22  2151 03/08/22  0216   WBC 10.4  --  5.3  --   --   --   --    HGB 15.4   < > 12.4*   < > 12.6* 12.5* 12.0*   HCT 44.5   < > 35.2*   < > 36.5* 35.8* 34.6*     --  111*  --   --   --   --     < > = values in this interval not displayed. Recent Labs     03/06/22 1708 03/07/22 0255    139   K 3.7 3.8    106   CO2 21 21   BUN 12 14   CREATININE 0.7* 0.6*   CALCIUM 9.2 7.8*     Recent Labs     03/06/22 1708   AST 15   ALT 19   BILITOT 0.9   ALKPHOS 82     No results for input(s): INR in the last 72 hours. No results for input(s): Alfredo Killings in the last 72 hours. Urinalysis:      Lab Results   Component Value Date    NITRU Negative 03/06/2022    WBCUA 0-2 03/06/2022    BACTERIA 1+ 03/06/2022    RBCUA  03/06/2022    BLOODU LARGE 03/06/2022    SPECGRAV 1.020 03/06/2022    GLUCOSEU Negative 03/06/2022       Radiology:  CTA ABDOMEN PELVIS W WO CONTRAST   Final Result   1. Findings of mild acute diverticulitis in the distal left colon. Mild   adjacent edema in the paracolic gutter. No mesenteric gas. No free   intraperitoneal air. Follow-up recommended to confirm resolution. 2. No evidence of active gastrointestinal bleeding. Assessment/Plan:    Active Hospital Problems    Diagnosis     GI bleed [K92.2]     BRBPR (bright red blood per rectum) [K62.5]     Tobacco abuse [Z72.0]     Obesity (BMI 30-39. 9) [E66.9]     Coronary artery disease [I25.10]      LLQ pain and BRBPR in patient anticoagulated on xarelto for antiphospholipid syndrome-laminar thrombus within the LAD  Possible diverticular bleed  -cipro and flagyl  -K centra given in ED  -IV fluids  -Q6 hour H/H-no drastic drop  -GI consulted in ED -   -Hold xarelto-may resume if okay with GI  -NPO   -tele monitoring  -type and screen  -IVP protonix     Obesity  With Body mass index is 66.4 kg/m². Complicating assessment and treatment. Placing patient at risk for multiple co-morbidities as well as early death and contributing to the patient's presentation.  Counseled on weight loss     Essential HTN in setting of known CAD  -continue amlodipine  -continue ASA     HLD  -continue atorvastatin     Tobacco use  -tobacco cessation  -nicotine patch    DVT Prophylaxis: SCD   diet: Diet NPO Exceptions are: Ice Chips, Sips of Clear Liquids  Code Status: Full Code    PT/OT Eval Status:     Dispo 1-2 days pending GI input   KERRY Acevedo MD

## 2022-03-08 NOTE — PROGRESS NOTES
Pt provided with discharge instructions. All questions answered. Pt is in stable condition. Wife to provide transportation.

## 2022-03-08 NOTE — ANESTHESIA PRE PROCEDURE
Department of Anesthesiology  Preprocedure Note       Name:  Johnnie Lopez   Age:  63 y.o.  :  1960                                          MRN:  [id number]         Date:  3/8/2022      Surgeon: Roxana Aguayo):  Tammy Briceno MD    Procedure: Procedure(s):  COLONOSCOPY DIAGNOSTIC    Medications prior to admission:   Prior to Admission medications    Medication Sig Start Date End Date Taking? Authorizing Provider   finasteride (PROSCAR) 5 MG tablet Take 5 mg by mouth daily   Yes Historical Provider, MD   atorvastatin (LIPITOR) 10 MG tablet TAKE 1 TABLET EVERY NIGHT AT BEDTIME 21  Yes Guillermina Pearson MD   amLODIPine (NORVASC) 5 MG tablet TAKE 1 TABLET EVERY DAY 21  Yes Guillermina Pearson MD   rivaroxaban (XARELTO) 20 MG TABS tablet TAKE 1 TABLET EVERY DAY 21  Yes Guillermina Pearson MD   aspirin 81 MG chewable tablet CHEW 1 TABLET DAILY 18  Yes Ambreen Barclay MD   nitroGLYCERIN (NITROSTAT) 0.4 MG SL tablet Place 1 tablet under the tongue every 5 minutes as needed for Chest pain.  14   Ambreen Barclay MD       Current medications:    Current Facility-Administered Medications   Medication Dose Route Frequency Provider Last Rate Last Admin    0.9 % sodium chloride infusion  50 mL IntraVENous Once Ever Tyson, APRN - CNP        amLODIPine (NORVASC) tablet 5 mg  5 mg Oral Daily Willetta Pica, APRN - CNP   5 mg at 22 9774    aspirin chewable tablet 81 mg  81 mg Oral Daily Willetta Pica, APRN - CNP   81 mg at 22 0904    atorvastatin (LIPITOR) tablet 10 mg  10 mg Oral Nightly Willetta Pica, APRN - CNP   10 mg at 22    finasteride (PROSCAR) tablet 5 mg  5 mg Oral Daily Willetta Pica, APRN - CNP   5 mg at 22 0741    sodium chloride flush 0.9 % injection 5-40 mL  5-40 mL IntraVENous 2 times per day Willetta Pica, APRN - CNP   10 mL at 22    sodium chloride flush 0.9 % injection 5-40 mL  5-40 mL IntraVENous PRN Willetta Pica, APRN - CNP        0.9 of left hip 10/9/2017       Past Surgical History:        Procedure Laterality Date    CARDIAC CATHETERIZATION  2014    COLONOSCOPY      COLONOSCOPY  5/2/16    colon polyp, biopsy ascending lesion    DENTAL SURGERY  2015    FRACTURE SURGERY Right 2004    tib-fib    JOINT REPLACEMENT Left 2018    left hip    JOINT REPLACEMENT Right 08/2020    SKIN BIOPSY         Social History:    Social History     Tobacco Use    Smoking status: Current Every Day Smoker     Packs/day: 0.50     Years: 30.00     Pack years: 15.00     Types: Cigarettes     Start date: 1/1/1980    Smokeless tobacco: Never Used    Tobacco comment: trying to quit    Substance Use Topics    Alcohol use: No     Alcohol/week: 0.0 standard drinks                                Ready to quit: Not Answered  Counseling given: Not Answered  Comment: trying to quit       Vital Signs (Current):   Vitals:    03/07/22 2104 03/07/22 2254 03/08/22 0522 03/08/22 0730   BP: (!) 182/70 (!) 155/76 (!) 158/78 108/62   Pulse: 58 62 65 68   Resp: 18 18 18 18   Temp: 97.4 °F (36.3 °C) 97.6 °F (36.4 °C) 97.8 °F (36.6 °C) 97.8 °F (36.6 °C)   TempSrc: Oral Oral Oral Oral   SpO2: 95% 96% 95% 92%   Weight:       Height:                                                  BP Readings from Last 3 Encounters:   03/08/22 108/62   10/28/21 118/72   06/29/21 124/78       NPO Status: Time of last liquid consumption: 0700                        Time of last solid consumption: 2000                        Date of last liquid consumption: 03/08/22                        Date of last solid food consumption: 03/05/22    BMI:   Wt Readings from Last 3 Encounters:   03/06/22 235 lb (106.6 kg)   10/28/21 242 lb (109.8 kg)   06/29/21 242 lb 3.2 oz (109.9 kg)     Body mass index is 35.73 kg/m².     CBC:   Lab Results   Component Value Date    WBC 5.3 03/07/2022    RBC 4.05 03/07/2022    RBC 4.97 04/13/2017    HGB 12.3 03/08/2022    HCT 35.1 03/08/2022    MCV 86.8 03/07/2022    RDW 13.3 03/07/2022     03/07/2022       CMP:   Lab Results   Component Value Date     03/07/2022    K 3.8 03/07/2022     03/07/2022    CO2 21 03/07/2022    BUN 14 03/07/2022    CREATININE 0.6 03/07/2022    GFRAA >60 03/07/2022    GFRAA >60 08/04/2011    AGRATIO 2.6 03/06/2022    LABGLOM >60 03/07/2022    GLUCOSE 103 03/07/2022    GLUCOSE 103 04/02/2015    PROT 6.4 03/06/2022    PROT 7.4 04/02/2015    CALCIUM 7.8 03/07/2022    BILITOT 0.9 03/06/2022    ALKPHOS 82 03/06/2022    AST 15 03/06/2022    ALT 19 03/06/2022       POC Tests: No results for input(s): POCGLU, POCNA, POCK, POCCL, POCBUN, POCHEMO, POCHCT in the last 72 hours. Coags:   Lab Results   Component Value Date    PROTIME 11.4 04/06/2014    INR 1.02 04/06/2014    APTT 37.1 03/06/2022       HCG (If Applicable): No results found for: PREGTESTUR, PREGSERUM, HCG, HCGQUANT     ABGs: No results found for: PHART, PO2ART, BAA0GWX, ZOO5KDO, BEART, Z5QGQRKG     Type & Screen (If Applicable):  No results found for: LABABO, LABRH    Drug/Infectious Status (If Applicable):  No results found for: HIV, HEPCAB    COVID-19 Screening (If Applicable):   Lab Results   Component Value Date    COVID19 Not Detected 03/07/2022           Anesthesia Evaluation  Patient summary reviewed and Nursing notes reviewed  Airway: Mallampati: III  TM distance: >3 FB   Neck ROM: limited  Mouth opening: > = 3 FB Dental: normal exam         Pulmonary:normal exam  breath sounds clear to auscultation  (+) sleep apnea:  current smoker                           Cardiovascular:    (+) CAD: no interval change,         Rhythm: regular  Rate: normal                    Neuro/Psych:   Negative Neuro/Psych ROS              GI/Hepatic/Renal: Neg GI/Hepatic/Renal ROS            Endo/Other: Negative Endo/Other ROS                    Abdominal:   (+) obese,           Vascular: negative vascular ROS.          Other Findings:             Anesthesia Plan      MAC     ASA 3       Induction: intravenous. Anesthetic plan and risks discussed with patient. Plan discussed with CRNA.                   Dixon Self MD   3/8/2022

## 2022-03-08 NOTE — DISCHARGE SUMMARY
Hospital Medicine Discharge Summary    Patient ID: Clarita Monson      Patient's PCP: Ga Dunne MD    Admit Date: 3/6/2022     Discharge Date: 3/8/2022      Admitting Provider: Rasta Raza MD     Discharge Provider: Sue Herrera MD     Discharge Diagnoses: Active Hospital Problems    Diagnosis     GI bleed [K92.2]     BRBPR (bright red blood per rectum) [K62.5]     Tobacco abuse [Z72.0]     Obesity (BMI 30-39. 9) [E66.9]     Coronary artery disease [I25.10]        The patient was seen and examined on day of discharge and this discharge summary is in conjunction with any daily progress note from day of discharge. Hospital Course:   LLQ pain and BRBPR in patient anticoagulated on xarelto for antiphospholipid syndrome-laminar thrombus within the LAD  Possible diverticular bleed  -cipro and flagyl  -K centra given in ED  -Hb stable   -GI consulted in ED -  S/p colonoscopy   1. Fair prep w yellow liquid stools  2. Mild diverticulosis and internal hemorrhoids  - xarelto- okay  ti resume per GI  -protonix     Obesity  With Body mass index is 35.7 kg/m². Complicating assessment and treatment. Placing patient at risk for multiple co-morbidities as well as early death and contributing to the patient's presentation. Counseled on weight loss     Essential HTN in setting of known CAD  -continue amlodipine  -continue ASA     HLD  -continue atorvastatin     Tobacco use  -tobacco cessation  -nicotine patch          Physical Exam Performed:     /72   Pulse 55   Temp 97.3 °F (36.3 °C) (Oral)   Resp 18   Ht 5' 8\" (1.727 m)   Wt 235 lb (106.6 kg)   SpO2 94%   BMI 35.73 kg/m²       General appearance: No apparent distress, appears stated age and cooperative. Obese  HEENT: P. Conjunctivae/corneas clear. Neck: Supple, with full range of motion. No jugular venous distention. Trachea midline. Respiratory:  Normal respiratory effort.  Clear to auscultation, bilaterally without Rales/Wheezes/Rhonchi. Cardiovascular: Regular rate and rhythm with normal S1/S2 without murmurs, rubs or gallops. Abdomen: Soft, non-tender, non-distended with normal bowel sounds. Obese  Musculoskeletal: No clubbing, cyanosis or edema bilaterally. Full range of motion without deformity. Skin: Skin color, texture, turgor normal.  No rashes or lesions. Neurologic:  Neurovascularly intact without any focal sensory/motor deficits. Psychiatric: Alert and oriented, thought content appropriate, normal insight  Capillary Refill: Brisk,3 seconds, normal   Peripheral Pulses: +2 palpable, equal bilaterally       Labs: For convenience and continuity at follow-up the following most recent labs are provided:      CBC:    Lab Results   Component Value Date    WBC 5.3 03/07/2022    HGB 12.3 03/08/2022    HCT 35.1 03/08/2022     03/07/2022       Renal:    Lab Results   Component Value Date     03/07/2022    K 3.8 03/07/2022     03/07/2022    CO2 21 03/07/2022    BUN 14 03/07/2022    CREATININE 0.6 03/07/2022    CALCIUM 7.8 03/07/2022         Significant Diagnostic Studies    Radiology:   CTA ABDOMEN PELVIS W WO CONTRAST   Final Result   1. Findings of mild acute diverticulitis in the distal left colon. Mild   adjacent edema in the paracolic gutter. No mesenteric gas. No free   intraperitoneal air. Follow-up recommended to confirm resolution. 2. No evidence of active gastrointestinal bleeding.                 Consults:     IP CONSULT TO GI  IP CONSULT TO HOSPITALIST  IP CONSULT TO GI    Disposition:  Home     Condition at Discharge: Stable    Discharge Instructions/Follow-up:  GI/ PCP    Code Status:  Prior     Activity: activity as tolerated    Diet: diabetic diet      Discharge Medications:     Discharge Medication List as of 3/8/2022 12:50 PM           Details   ciprofloxacin (CIPRO) 500 MG tablet Take 1 tablet by mouth 2 times daily for 10 days, Disp-20 tablet, R-0Normal      metroNIDAZOLE (FLAGYL) 500 MG tablet Take 1 tablet by mouth 3 times daily for 10 days, Disp-30 tablet, R-0Normal              Details   finasteride (PROSCAR) 5 MG tablet Take 5 mg by mouth dailyHistorical Med      atorvastatin (LIPITOR) 10 MG tablet TAKE 1 TABLET EVERY NIGHT AT BEDTIME, Disp-90 tablet, R-1Normal      rivaroxaban (XARELTO) 20 MG TABS tablet TAKE 1 TABLET EVERY DAY, Disp-90 tablet, R-1Normal      amLODIPine (NORVASC) 5 MG tablet TAKE 1 TABLET EVERY DAY, Disp-90 tablet, R-1Normal      aspirin 81 MG chewable tablet CHEW 1 TABLET DAILY, Disp-90 tablet, R-3Normal      nitroGLYCERIN (NITROSTAT) 0.4 MG SL tablet Place 1 tablet under the tongue every 5 minutes as needed for Chest pain., Disp-25 tablet, R-11             Time Spent on discharge is   40  minutes in the examination, evaluation, counseling and review of medications and discharge plan. Signed:    Gelacio Glass MD   4/3/2022      Thank you Julissa Pulido MD for the opportunity to be involved in this patient's care. If you have any questions or concerns please feel free to contact me at [phone].

## 2022-03-08 NOTE — OP NOTE
Colonoscopy Note    Patient:   Elvin Tovar    YOB: 1960    Facility:   [facility] [Inpatient]  Referring/PCP: Nikos Curiel MD  Procedure:   Colonoscopy --diagnostic  Date:     3/8/2022  Endoscopist:  Merlin Stephens MD, MD    Preoperative Diagnosis:  previous adenomatous polyp  hematochezia. Postoperative Diagnosis:  Mild diverticulosis/hemorrhoids    Anesthesia: MAC    Estimated blood loss: < 5 ml    Complications:  None    Description of Procedure:  Informed consent was obtained from the patient after explanation of the procedure including indications, description of the procedure,  benefits and possible risks and complications of the procedure, and alternatives. Questions were answered. The patient's history was reviewed and a directed physical examination was performed prior to the procedure. Patient was monitored throughout the procedure with pulse oximetry and periodic assessment of vital signs. Patient was sedated as noted above. The Nursing staff and I performed a time out. With the patient initially in the left lateral decubitus position, a digital rectal examination was performed and revealed negative without mass, lesions or tenderness. The Olympus video colonoscope was placed in the patient's rectum and advanced without difficulty  to the cecum, which was identified by the ileocecal valve and appendiceal orifice. Photographs were taken. The prep was fair. Examination of the mucosa was performed during both introduction and withdrawal of the colonoscope. Retroflexed view of the rectum was performed. Withdrawal time was 6 minutes. Findings:     1. Fair prep w yellow liquid stools  2.  Mild diverticulosis and internal hemorrhoids     Recommendations:  OK to D/C home from GI standpoint     Merlin Stephens MD       [phone]) [phone]          Merlin Stephens MD, MD

## 2022-03-08 NOTE — PROGRESS NOTES
Pt A&Ox4. VSS. Shift assessment completed Colonoscopy today. Denies pain at this time. Call light within reach, bed in lowest position, wheels locked.

## 2022-03-09 ENCOUNTER — CARE COORDINATION (OUTPATIENT)
Dept: CASE MANAGEMENT | Age: 62
End: 2022-03-09

## 2022-03-09 DIAGNOSIS — K92.2 GASTROINTESTINAL HEMORRHAGE, UNSPECIFIED GASTROINTESTINAL HEMORRHAGE TYPE: Primary | ICD-10-CM

## 2022-03-09 NOTE — CARE COORDINATION
[facility] Transitions Initial Follow Up Call    Call within 2 business days of discharge: Yes    Patient: Evelio Perez Patient : 1960   MRN: [id number]  Reason for Admission: GIB   Discharge Date: 3/8/22 RARS: Readmission Risk Score: 7.3 ( )      Last Discharge [facility]       Complaint Diagnosis Description Type Department Provider    3/6/22 Abdominal Pain Gastrointestinal hemorrhage, unspecified gastrointestinal hemorrhage type . .. ED to Hosp-Admission (Discharged) (ADMITTED) Aiden Dumont MD; Kellie Bloch. .. Spoke with: Kailey Torres 47: [facility]     Transitions of Care Initial Call    Was this an external facility discharge? No Discharge Facility: n/a    Challenges to be reviewed by the provider   Additional needs identified to be addressed with provider: No  none             Method of communication with provider : none      Advance Care Planning:   Does patient have an Advance Directive: not on file. Was this a readmission? No  Patient stated reason for admission: gib  Patients top risk factors for readmission: medical condition-.    Care Transition Nurse (CTN) contacted the patient by telephone to perform post hospital discharge assessment. Verified name and  with patient as identifiers. Provided introduction to self, and explanation of the CTN role. CTN reviewed discharge instructions, medical action plan and red flags with patient who verbalized understanding. Patient given an opportunity to ask questions and does not have any further questions or concerns at this time. Were discharge instructions available to patient? Yes. Reviewed appropriate site of care based on symptoms and resources available to patient including: PCP, Specialist and When to call 911. The patient agrees to contact the PCP office for questions related to their healthcare.      Medication reconciliation was performed with patient, who verbalizes understanding of administration of home medications. Advised obtaining a 90-day supply of all daily and as-needed medications. Reviewed and educated patient on any new and changed medications related to discharge diagnosis. CTN provided contact information. Plan for follow-up call in 5-7 days based on severity of symptoms and risk factors. Plan for next call: PCP apt/GI mangement      Spoke with patient who reported he is doing fine. Patient denied any bleeding at this time. CTN reviewed all medications with patient who reported he is taking all as prescribed. CTN offered to schedule PCP apt and patient agreeable to VV with PCP on 3/11. Patient to call GI and setup apt. Patient encouraged to contact GI provider with any bleeding or worsening symptoms. Denies any acute needs at present time. Agreeable to f/u calls. Educated on the use of urgent care or physicians 24 hr access line if assistance is needed after hours.            Care Transitions 24 Hour Call    Care Transitions Interventions         Follow Up  Future Appointments   Date Time Provider Pacheco Hector   3/11/2022  8:15 AM MD ASH Sheikh - DYSTARR   5/9/2022  3:40 PM Debbie Whitt, APRN - CNP [facility]       Wai Armendariz BSN, RN, [facility]  Care Transition Nurse  [phone] mobile

## 2022-03-11 ENCOUNTER — TELEMEDICINE (OUTPATIENT)
Dept: FAMILY MEDICINE CLINIC | Age: 62
End: 2022-03-11
Payer: COMMERCIAL

## 2022-03-11 DIAGNOSIS — K92.2 GASTROINTESTINAL HEMORRHAGE, UNSPECIFIED GASTROINTESTINAL HEMORRHAGE TYPE: ICD-10-CM

## 2022-03-11 DIAGNOSIS — R76.0 ANTIPHOSPHOLIPID ANTIBODY POSITIVE: ICD-10-CM

## 2022-03-11 DIAGNOSIS — K57.92 DIVERTICULITIS: Primary | ICD-10-CM

## 2022-03-11 PROCEDURE — 99214 OFFICE O/P EST MOD 30 MIN: CPT | Performed by: FAMILY MEDICINE

## 2022-03-11 NOTE — PROGRESS NOTES
Abdi Ruggiero (:  1960) is a Established patient, here for evaluation of the following:    Assessment & Plan   Below is the assessment and plan developed based on review of pertinent history, physical exam, labs, studies, and medications. 1. Diverticulitis  Patient to complete course of antibiotics  2. Gastrointestinal hemorrhage, unspecified gastrointestinal hemorrhage type  Watch for continued gastrointestinal bleeding. Likely from the diverticulitis. 3. Antiphospholipid antibody positive  Continue anticoagulation as prescribed due to high risk of blood clots. No follow-ups on file. Subjective   Abdi Ruggiero is a 63 y.o. male. Patient presents with: Follow-Up from Hospital: GI bleed       Patient had GI bleed at home, went to hospital right away, passed out at hospital.  Patient was found to have diverticular latus versus diverticulosis. At time of colonoscopy was not bleeding from that source. Also does have bleeding hemorrhoids. Patient was placed on oral antibiotics for diverticulitis. Subsequently bleeding is stopped and he has been feeling better. His hemoglobin did not drop significantly. He does feel that he is doing better. He requires anticoagulation for his chronic disease processes. Patient has been generally feeling better has had no weakness or fatigue. No shortness of breath. No chest pain. The patients PMH, surgical history, family history, medications, allergies were all reviewed and updated as appropriate today.       Review of Systems       Objective   Patient-Reported Vitals  No data recorded     Physical Exam  [INSTRUCTIONS:  \"[x]\" Indicates a positive item  \"[]\" Indicates a negative item  -- DELETE ALL ITEMS NOT EXAMINED]    Constitutional: [x] Appears well-developed and well-nourished [x] No apparent distress      [] Abnormal -     Mental status: [x] Alert and awake  [x] Oriented to person/place/time [x] Able to follow commands    [] Abnormal -     Eyes:   EOM [x]  Normal    [] Abnormal -   Sclera  [x]  Normal    [] Abnormal -          Discharge [x]  None visible   [] Abnormal -     HENT: [x] Normocephalic, atraumatic  [] Abnormal -   [x] Mouth/Throat: Mucous membranes are moist    External Ears [x] Normal  [] Abnormal -    Neck: [x] No visualized mass [] Abnormal -     Pulmonary/Chest: [x] Respiratory effort normal   [x] No visualized signs of difficulty breathing or respiratory distress        [] Abnormal -      Musculoskeletal:   [x] Normal gait with no signs of ataxia         [x] Normal range of motion of neck        [] Abnormal -     Neurological:        [x] No Facial Asymmetry (Cranial nerve 7 motor function) (limited exam due to video visit)          [x] No gaze palsy        [] Abnormal -          Skin:        [x] No significant exanthematous lesions or discoloration noted on facial skin         [] Abnormal -            Psychiatric:       [x] Normal Affect [] Abnormal -        [x] No Hallucinations    Other pertinent observable physical exam findings:-             On this date 3/11/2022 I have spent 30 minutes reviewing previous notes, test results and face to face (virtual) with the patient discussing the diagnosis and importance of compliance with the treatment plan as well as documenting on the day of the visitJanine Veloz, was evaluated through a synchronous (real-time) audio-video encounter. The patient (or guardian if applicable) is aware that this is a billable service, which includes applicable co-pays. This Virtual Visit was conducted with patient's (and/or legal guardian's) consent. The visit was conducted pursuant to the emergency declaration under the [address] [place] [address], [address] [place] [address] authority and the [employer] General Act. Patient identification was verified, and a caregiver was present when appropriate.  The patient was located at home in a state where the provider was licensed to provide care.        --Nader Diaz MD

## 2022-03-16 ENCOUNTER — CARE COORDINATION (OUTPATIENT)
Dept: CASE MANAGEMENT | Age: 62
End: 2022-03-16

## 2022-03-16 NOTE — CARE COORDINATION
[facility] Transitions Follow Up Call    3/16/2022    Patient: Lennox Gallant  Patient : 1960   MRN: [id number]  Reason for Admission: GIB   Discharge Date: 3/8/22 RARS: Readmission Risk Score: 7.3 ( )         Spoke with: Lennox Gallant    Spoke with patient who reported he is doing well. Patient denied any bleeding. Patient reported his bowels are regular and denied any appetite issues. Patient had follow up with PCP and overall is doing well. Denies any acute needs at present time. Agreeable to f/u calls. Educated on the use of urgent care or physicians 24 hr access line if assistance is needed after hours. Care Transitions Subsequent and Final Call    Subsequent and Final Calls  Care Transitions Interventions  Other Interventions:            Follow Up  Future Appointments   Date Time Provider Pacheco Hector   2022  8:00 AM MD ASH Hahn - EUGENE   2022  3:40 PM Rusty Carcamo APRN - CNP [facility], RN, Critical access hospital  Care Transition Nurse  [phone] mobile

## 2022-03-25 ENCOUNTER — CARE COORDINATION (OUTPATIENT)
Dept: CASE MANAGEMENT | Age: 62
End: 2022-03-25

## 2022-03-25 NOTE — CARE COORDINATION
[facility] Transitions Follow Up Call    3/25/2022    Patient: Johana Joshua  Patient : 1960   MRN: [id number]  Reason for Admission: GIB   Discharge Date: 3/8/22 RARS: Readmission Risk Score: 7.3 ( )         Spoke with: Johana Joshua    CTN spoke with patient who reported he is doing well. Patient denied any s/sx of bleeding and reported appetite has been regular. Patient also reported regular bowel movements at this time. Patient denied any further needs or concerns at this time. Care Transitions Subsequent and Final Call    Subsequent and Final Calls  Care Transitions Interventions  Other Interventions:            Follow Up  Future Appointments   Date Time Provider Pacheco Hector   2022  8:00 AM MD ASH Gold [place] - [place]   2022  3:40 PM GERARD Dockery - CNP [facility]

## 2022-03-28 NOTE — PROGRESS NOTES
Physician Progress Note      PATIENT:               Gamaliel Wu  [facility] #:                  [id number]  :                       1960  ADMIT DATE:       3/6/2022 4:24 PM  Fabio Cope DATE:        3/8/2022 2:31 PM  RESPONDING  PROVIDER #:        Colt Burnham MD          QUERY TEXT:    Patient admitted with GI bleed and is on Xarelto. If possible, please   document in the progress notes and discharge summary if you are evaluating   and/or treating any of the following: The medical record reflects the following:  Risk Factors: xarelto, diverticulitis, Anti phospholipid syndrome  Clinical Indicators: melena  Treatment: GI consult, colonoscopy, serial labs, CT, stopped xarelto,   supportive care    Thank you,  Maynor Mcfadden RN, [email or web address]  Options provided:  -- GI Bleeding associated with Xarelto. -- GI Bleeding unrelated to anticoagulation  -- Other - I will add my own diagnosis  -- Disagree - Not applicable / Not valid  -- Disagree - Clinically unable to determine / Unknown  -- Refer to Clinical Documentation Reviewer    PROVIDER RESPONSE TEXT:    GI bleeding is unrelated to anticoagulation.     Query created by: Mars Ma on 3/28/2022 8:44 AM      Electronically signed by:  Colt Burnham MD 3/28/2022 10:14 AM

## 2022-04-01 RX ORDER — AMLODIPINE BESYLATE 5 MG/1
TABLET ORAL
Qty: 90 TABLET | Refills: 2 | Status: SHIPPED | OUTPATIENT
Start: 2022-04-01 | End: 2022-04-05 | Stop reason: SDUPTHER

## 2022-04-01 RX ORDER — ASPIRIN 81 MG/1
TABLET, CHEWABLE ORAL
Qty: 90 TABLET | Refills: 2 | Status: SHIPPED | OUTPATIENT
Start: 2022-04-01 | End: 2022-04-05 | Stop reason: SDUPTHER

## 2022-04-05 ENCOUNTER — OFFICE VISIT (OUTPATIENT)
Dept: FAMILY MEDICINE CLINIC | Age: 62
End: 2022-04-05
Payer: COMMERCIAL

## 2022-04-05 VITALS
SYSTOLIC BLOOD PRESSURE: 110 MMHG | HEART RATE: 59 BPM | WEIGHT: 239 LBS | DIASTOLIC BLOOD PRESSURE: 70 MMHG | OXYGEN SATURATION: 97 % | BODY MASS INDEX: 36.22 KG/M2 | HEIGHT: 68 IN

## 2022-04-05 DIAGNOSIS — Z12.5 SCREENING FOR PROSTATE CANCER: ICD-10-CM

## 2022-04-05 DIAGNOSIS — K92.2 GASTROINTESTINAL HEMORRHAGE, UNSPECIFIED GASTROINTESTINAL HEMORRHAGE TYPE: ICD-10-CM

## 2022-04-05 DIAGNOSIS — N40.0 BENIGN PROSTATIC HYPERPLASIA, UNSPECIFIED WHETHER LOWER URINARY TRACT SYMPTOMS PRESENT: ICD-10-CM

## 2022-04-05 DIAGNOSIS — I25.10 CORONARY ARTERY DISEASE INVOLVING NATIVE CORONARY ARTERY OF NATIVE HEART WITHOUT ANGINA PECTORIS: ICD-10-CM

## 2022-04-05 DIAGNOSIS — Z13.1 SCREENING FOR DIABETES MELLITUS: ICD-10-CM

## 2022-04-05 DIAGNOSIS — Z00.00 ENCOUNTER FOR WELL ADULT EXAM WITHOUT ABNORMAL FINDINGS: Primary | ICD-10-CM

## 2022-04-05 DIAGNOSIS — R76.0 ANTIPHOSPHOLIPID ANTIBODY POSITIVE: ICD-10-CM

## 2022-04-05 LAB
A/G RATIO: 2.4 (ref 1.1–2.2)
ALBUMIN SERPL-MCNC: 4.6 G/DL (ref 3.4–5)
ALP BLD-CCNC: 65 U/L (ref 40–129)
ALT SERPL-CCNC: 31 U/L (ref 10–40)
ANION GAP SERPL CALCULATED.3IONS-SCNC: 13 MMOL/L (ref 3–16)
AST SERPL-CCNC: 19 U/L (ref 15–37)
BASOPHILS ABSOLUTE: 0 K/UL (ref 0–0.2)
BASOPHILS RELATIVE PERCENT: 0.9 %
BILIRUB SERPL-MCNC: 0.3 MG/DL (ref 0–1)
BUN BLDV-MCNC: 11 MG/DL (ref 7–20)
CALCIUM SERPL-MCNC: 9.3 MG/DL (ref 8.3–10.6)
CHLORIDE BLD-SCNC: 106 MMOL/L (ref 99–110)
CHOLESTEROL, FASTING: 159 MG/DL (ref 0–199)
CO2: 21 MMOL/L (ref 21–32)
CREAT SERPL-MCNC: 0.7 MG/DL (ref 0.8–1.3)
EOSINOPHILS ABSOLUTE: 0.1 K/UL (ref 0–0.6)
EOSINOPHILS RELATIVE PERCENT: 1.5 %
GFR AFRICAN AMERICAN: >60
GFR NON-AFRICAN AMERICAN: >60
GLUCOSE BLD-MCNC: 104 MG/DL (ref 70–99)
HCT VFR BLD CALC: 42.4 % (ref 40.5–52.5)
HDLC SERPL-MCNC: 28 MG/DL (ref 40–60)
HEMOGLOBIN: 14.7 G/DL (ref 13.5–17.5)
LDL CHOLESTEROL CALCULATED: 84 MG/DL
LYMPHOCYTES ABSOLUTE: 0.8 K/UL (ref 1–5.1)
LYMPHOCYTES RELATIVE PERCENT: 18.8 %
MCH RBC QN AUTO: 30.1 PG (ref 26–34)
MCHC RBC AUTO-ENTMCNC: 34.6 G/DL (ref 31–36)
MCV RBC AUTO: 86.8 FL (ref 80–100)
MONOCYTES ABSOLUTE: 0.3 K/UL (ref 0–1.3)
MONOCYTES RELATIVE PERCENT: 8 %
NEUTROPHILS ABSOLUTE: 3.1 K/UL (ref 1.7–7.7)
NEUTROPHILS RELATIVE PERCENT: 70.8 %
PDW BLD-RTO: 13.8 % (ref 12.4–15.4)
PLATELET # BLD: 132 K/UL (ref 135–450)
PMV BLD AUTO: 8 FL (ref 5–10.5)
POTASSIUM SERPL-SCNC: 3.6 MMOL/L (ref 3.5–5.1)
PROSTATE SPECIFIC ANTIGEN: 0.52 NG/ML (ref 0–4)
RBC # BLD: 4.88 M/UL (ref 4.2–5.9)
SODIUM BLD-SCNC: 140 MMOL/L (ref 136–145)
TOTAL PROTEIN: 6.5 G/DL (ref 6.4–8.2)
TRIGLYCERIDE, FASTING: 233 MG/DL (ref 0–150)
VLDLC SERPL CALC-MCNC: 47 MG/DL
WBC # BLD: 4.3 K/UL (ref 4–11)

## 2022-04-05 PROCEDURE — 36415 COLL VENOUS BLD VENIPUNCTURE: CPT | Performed by: FAMILY MEDICINE

## 2022-04-05 PROCEDURE — 99396 PREV VISIT EST AGE 40-64: CPT | Performed by: FAMILY MEDICINE

## 2022-04-05 RX ORDER — ATORVASTATIN CALCIUM 10 MG/1
TABLET, FILM COATED ORAL
Qty: 90 TABLET | Refills: 1 | Status: SHIPPED | OUTPATIENT
Start: 2022-04-05 | End: 2022-09-19

## 2022-04-05 RX ORDER — FINASTERIDE 5 MG/1
5 TABLET, FILM COATED ORAL DAILY
Qty: 90 TABLET | Refills: 1 | Status: SHIPPED | OUTPATIENT
Start: 2022-04-05 | End: 2022-10-04

## 2022-04-05 RX ORDER — AMLODIPINE BESYLATE 5 MG/1
TABLET ORAL
Qty: 90 TABLET | Refills: 1 | Status: SHIPPED | OUTPATIENT
Start: 2022-04-05 | End: 2022-09-19

## 2022-04-05 RX ORDER — ASPIRIN 81 MG/1
TABLET, CHEWABLE ORAL
Qty: 90 TABLET | Refills: 1 | Status: SHIPPED | OUTPATIENT
Start: 2022-04-05

## 2022-04-05 NOTE — PROGRESS NOTES
Well Adult Note  Name: Antonia Luevano Date: 2022   MRN: [id number] Sex: Male   Age: 63 y.o. Ethnicity: Non- / Non    : 1960 Race: White (non-)      Mandy Alexandra is here for well adult exam.  History:  Patient presents for complete physical exam today. Patient denies any significant issues at this time he did have a history of gastrointestinal hemorrhage that was thought to be from being anticoagulated and having diverticulitis. He has not been stable. Review of Systems   Constitutional: Negative. HENT: Negative. Eyes: Negative. Respiratory: Negative. Cardiovascular: Negative. Gastrointestinal: Negative. Endocrine: Negative. Genitourinary: Negative. Musculoskeletal: Negative. Skin: Negative. Allergic/Immunologic: Negative. Neurological: Negative. Hematological: Negative. Psychiatric/Behavioral: Negative. All other systems reviewed and are negative. Allergies   Allergen Reactions    Lorazepam      Used as a pre-op with L hip Repl, had an anxiety rx       Prior to Visit Medications    Medication Sig Taking? Authorizing Provider   amLODIPine (NORVASC) 5 MG tablet TAKE 1 TABLET EVERY DAY Yes Ivan Barroso DO   aspirin 81 MG chewable tablet CHEW 1 TABLET DAILY Yes Ivan Barroso DO   finasteride (PROSCAR) 5 MG tablet Take 5 mg by mouth daily Yes Historical Provider, MD   atorvastatin (LIPITOR) 10 MG tablet TAKE 1 TABLET EVERY NIGHT AT BEDTIME Yes Parker Jackson MD   rivaroxaban (XARELTO) 20 MG TABS tablet TAKE 1 TABLET EVERY DAY Yes Parker Jackson MD   nitroGLYCERIN (NITROSTAT) 0.4 MG SL tablet Place 1 tablet under the tongue every 5 minutes as needed for Chest pain.   Patient not taking: Reported on 2022  Armand Foster MD       Past Medical History:   Diagnosis Date    [facility]     Blood clotting disorder ([facility]     Coronary artery disease 2014    Dr. Rosa Whitman hematology and  [facility]    Hyperlipidemia     DEE on CPAP     Osteoarthritis of right hip     Primary osteoarthritis of left hip 10/9/2017       Past Surgical History:   Procedure Laterality Date    CARDIAC CATHETERIZATION  2014    COLONOSCOPY      COLONOSCOPY  5/2/16    colon polyp, biopsy ascending lesion    COLONOSCOPY N/A 3/8/2022    COLONOSCOPY DIAGNOSTIC performed by Gavin Hong MD at [address] [place] [address]  2015    FRACTURE SURGERY Right 2004    tib-fib    JOINT REPLACEMENT Left 2018    left hip    JOINT REPLACEMENT Right 08/2020    SKIN BIOPSY         Family History   Problem Relation Age of Onset    Cancer Mother     High Cholesterol Father        Social History     Tobacco Use    Smoking status: Current Every Day Smoker     Packs/day: 0.50     Years: 30.00     Pack years: 15.00     Types: Cigarettes     Start date: 1/1/1980    Smokeless tobacco: Never Used    Tobacco comment: trying to quit    Vaping Use    Vaping Use: Never used   Substance Use Topics    Alcohol use: No     Alcohol/week: 0.0 standard drinks    Drug use: No       Objective   /70   Pulse 59   Ht 5' 7.75\" (1.721 m)   Wt 239 lb (108.4 kg)   SpO2 97%   BMI 36.61 kg/m²   Wt Readings from Last 3 Encounters:   04/05/22 239 lb (108.4 kg)   03/06/22 235 lb (106.6 kg)   10/28/21 242 lb (109.8 kg)     There were no vitals filed for this visit. Physical Exam  Vitals and nursing note reviewed. Constitutional:       Appearance: Normal appearance. He is well-developed. HENT:      Head: Normocephalic and atraumatic. Right Ear: External ear normal.      Left Ear: External ear normal.      Nose: Nose normal.   Eyes:      Conjunctiva/sclera: Conjunctivae normal.      Pupils: Pupils are equal, round, and reactive to light. Cardiovascular:      Rate and Rhythm: Normal rate and regular rhythm. Heart sounds: Normal heart sounds. No murmur heard. No friction rub. No gallop.     Pulmonary:      Effort: Pulmonary effort is normal. No respiratory distress. Breath sounds: Normal breath sounds. No wheezing. Abdominal:      General: Bowel sounds are normal. There is no distension. Palpations: Abdomen is soft. Tenderness: There is no abdominal tenderness. Musculoskeletal:         General: No tenderness. Normal range of motion. Cervical back: Normal range of motion and neck supple. Skin:     General: Skin is warm and dry. Neurological:      Mental Status: He is alert and oriented to person, place, and time. Deep Tendon Reflexes: Reflexes are normal and symmetric. Psychiatric:         Behavior: Behavior normal.         Thought Content: Thought content normal.         Judgment: Judgment normal.           Assessment   Plan   1. Gastrointestinal hemorrhage, unspecified gastrointestinal hemorrhage type  2. Coronary artery disease involving native coronary artery of native heart without angina pectoris  The following orders have not been finalized:  -     aspirin 81 MG chewable tablet  -     atorvastatin (LIPITOR) 10 MG tablet  3. Benign prostatic hyperplasia, unspecified whether lower urinary tract symptoms present  The following orders have not been finalized:  -     finasteride (PROSCAR) 5 MG tablet  4. Screening for prostate cancer  5. Screening for diabetes mellitus  6. Antiphospholipid antibody positive  The following orders have not been finalized:  -     rivaroxaban (XARELTO) 20 MG TABS tablet  7.  Encounter for well adult exam without abnormal findings         Personalized Preventive Plan   Current Health Maintenance Status  Immunization History   Administered Date(s) Administered    DISHAID-19, Mark Clifton, Primary or Immunocompromised, PF, 100mcg/0.5mL 04/13/2021, 05/11/2021, 01/16/2022    Influenza Virus Vaccine 10/01/2018    Influenza, Intradermal, Preservative free 09/30/2015    Influenza, Intradermal, Quadrivalent, Preservative Free 12/12/2016, 09/19/2017    Influenza, Silvio Age, IM, PF (6 mo and older Fluzone, Flulaval, Fluarix, and 3 yrs and older Afluria) 12/15/2020, 10/16/2021    Pneumococcal Polysaccharide ([id number]) 09/06/2018    Tdap (Boostrix, Adacel) 09/30/2015        Health Maintenance   Topic Date Due    Hepatitis C screen  Never done    HIV screen  Never done    Shingles Vaccine (1 of 2) Never done    Diabetes screen  09/30/2018    Lipid screen  09/16/2020    Depression Screen  06/29/2022    DTaP/Tdap/Td vaccine (2 - Td or Tdap) 09/30/2025    Pneumococcal 0-64 years Vaccine (2 of 2 - PPSV23) 03/26/2027    Colorectal Cancer Screen  03/08/2032    Flu vaccine  Completed    COVID-19 Vaccine  Completed    Hepatitis A vaccine  Aged Out    Hepatitis B vaccine  Aged Out    Hib vaccine  Aged Out    Meningococcal (ACWY) vaccine  Aged Out     Recommendations for [employer] Due: see orders and patient instructions/AVS.    No follow-ups on file.

## 2022-04-06 LAB
ESTIMATED AVERAGE GLUCOSE: 99.7 MG/DL
HBA1C MFR BLD: 5.1 %

## 2022-04-10 ASSESSMENT — ENCOUNTER SYMPTOMS
ALLERGIC/IMMUNOLOGIC NEGATIVE: 1
EYES NEGATIVE: 1
GASTROINTESTINAL NEGATIVE: 1
RESPIRATORY NEGATIVE: 1

## 2022-05-09 ENCOUNTER — OFFICE VISIT (OUTPATIENT)
Dept: PULMONOLOGY | Age: 62
End: 2022-05-09
Payer: COMMERCIAL

## 2022-05-09 VITALS
DIASTOLIC BLOOD PRESSURE: 68 MMHG | HEART RATE: 68 BPM | BODY MASS INDEX: 36.22 KG/M2 | OXYGEN SATURATION: 98 % | HEIGHT: 68 IN | SYSTOLIC BLOOD PRESSURE: 110 MMHG | WEIGHT: 239 LBS

## 2022-05-09 DIAGNOSIS — E66.9 OBESITY (BMI 30-39.9): ICD-10-CM

## 2022-05-09 DIAGNOSIS — G47.33 MODERATE OBSTRUCTIVE SLEEP APNEA: Primary | ICD-10-CM

## 2022-05-09 DIAGNOSIS — Z71.89 CPAP USE COUNSELING: ICD-10-CM

## 2022-05-09 PROCEDURE — 99213 OFFICE O/P EST LOW 20 MIN: CPT | Performed by: NURSE PRACTITIONER

## 2022-05-09 ASSESSMENT — SLEEP AND FATIGUE QUESTIONNAIRES
NECK CIRCUMFERENCE (INCHES): 17
HOW LIKELY ARE YOU TO NOD OFF OR FALL ASLEEP WHILE WATCHING TV: 1
ESS TOTAL SCORE: 5
HOW LIKELY ARE YOU TO NOD OFF OR FALL ASLEEP WHILE LYING DOWN TO REST IN THE AFTERNOON WHEN CIRCUMSTANCES PERMIT: 2
HOW LIKELY ARE YOU TO NOD OFF OR FALL ASLEEP WHILE SITTING AND READING: 2
HOW LIKELY ARE YOU TO NOD OFF OR FALL ASLEEP WHILE SITTING INACTIVE IN A PUBLIC PLACE: 0
HOW LIKELY ARE YOU TO NOD OFF OR FALL ASLEEP WHILE SITTING AND TALKING TO SOMEONE: 0
HOW LIKELY ARE YOU TO NOD OFF OR FALL ASLEEP WHEN YOU ARE A PASSENGER IN A CAR FOR AN HOUR WITHOUT A BREAK: 0
HOW LIKELY ARE YOU TO NOD OFF OR FALL ASLEEP IN A CAR, WHILE STOPPED FOR A FEW MINUTES IN TRAFFIC: 0
HOW LIKELY ARE YOU TO NOD OFF OR FALL ASLEEP WHILE SITTING QUIETLY AFTER LUNCH WITHOUT ALCOHOL: 0

## 2022-05-09 NOTE — PROGRESS NOTES
CHIEF COMPLAINT: Sleep apnea    Harper Corona is a 63 y.o. male in office for DEE follow up. Patient is using CPAP   6 hrs/night. Using humidifier. No snoring on CPAP. The pressure is well tolerated. The mask is comfortable- full face. No mask leak. No significant daytime sleepiness. No nodding off when driving. No dry nose or throat. No fatigue. Bedtime is 10 pm-2 am and rise time is 730 or 930-11 am on weekends. Sleep onset is usually few minutes. Wakes up 0-1 times at night total. No nocturia. It takes few minutes to fall back a sleep. Rare naps during the day. No headache in am. No weight gain. 1 caffienated beverages during the day. No alcohol. ESS is 5.       Past Medical History:   Diagnosis Date    Antiphospholipid syndrome ([facility] [address] [facility]     Blood clotting disorder ([facility]     Coronary artery disease 4/23/2014    Dr. Julianna Triplett hematology and Dr. Edvin Sifuentes Cardiology    Hyperlipidemia     DEE on CPAP     Osteoarthritis of right hip     Primary osteoarthritis of left hip 10/9/2017     Past Surgical History:   Procedure Laterality Date    CARDIAC CATHETERIZATION  2014    COLONOSCOPY      COLONOSCOPY  5/2/16    colon polyp, biopsy ascending lesion    COLONOSCOPY N/A 3/8/2022    COLONOSCOPY DIAGNOSTIC performed by Felicia Johnson MD at [address] [place], [place] [address]  2015    FRACTURE SURGERY Right 2004    tib-fib    JOINT REPLACEMENT Left 2018    left hip    JOINT REPLACEMENT Right 08/2020    SKIN BIOPSY       Allergies   Allergen Reactions    Lorazepam      Used as a pre-op with L hip Repl, had an anxiety rx        Current Medications:    Current Outpatient Medications:     amLODIPine (NORVASC) 5 MG tablet, TAKE 1 TABLET EVERY DAY, Disp: 90 tablet, Rfl: 1    aspirin 81 MG chewable tablet, CHEW 1 TABLET DAILY, Disp: 90 tablet, Rfl: 1    atorvastatin (LIPITOR) 10 MG tablet, TAKE 1 TABLET EVERY NIGHT AT BEDTIME, Disp: 90 tablet, Rfl: 1    finasteride (PROSCAR) 5 MG tablet, Take 1 tablet by mouth daily, Disp: 90 tablet, Rfl: 1    rivaroxaban (XARELTO) 20 MG TABS tablet, TAKE 1 TABLET EVERY DAY, Disp: 90 tablet, Rfl: 1    nitroGLYCERIN (NITROSTAT) 0.4 MG SL tablet, Place 1 tablet under the tongue every 5 minutes as needed for Chest pain., Disp: 25 tablet, Rfl: 11      Objective:   PHYSICAL EXAM:    Blood pressure 110/68, pulse 68, height 5' 8\" (1.727 m), weight 239 lb (108.4 kg), SpO2 98 %.' on RA  Gen: No acute distress. Obese. BMI of 36.34  Eyes: PERRL. No sclera icterus. No conjunctival injection. ENT: No discharge. Neck: Trachea midline. No obvious mass. Neck circumference 17\"  Resp: No accessory muscle use. No crackles. No wheezes. No rhonchi. CV: Regular rate. Regular rhythm. No murmur or rub. Skin: Warm and dry. M/S: No cyanosis. No obvious joint deformity. Neuro: Awake. Alert. Moves all four extremities. Psych: Oriented x 3. No anxiety. DATA:   PSG  1/4/16 AHI 22. and desaturation to 69%. PLMS index 27.3/hr with arousal index 26   CPAP 1/31/16 showed AHI down to 3.9 with CPAP, with PLMS index 43.8. CPAP compliance data:  Compliance download report from 2/18/17 to 3/19/17 reviewed today by me and showed patient is using machine 4:39 hrs/night with 53% compliance and AHI 0.5 within this time frame. 16/30days with greater than 4 hours of machine use. 90% pressure 11.7 cm H20    Compliance download report from 2/21/18 to 3/22/18 reviewed today by me and showed patient is using machine 4:13 hrs/night with 50% compliance and AHI 0.5 within this time frame. 15/30days with greater than 4 hours of machine use. 90% pressure 11.8 cm H20 on auto CPAP 9-13 cm H2O    Compliance download report from 3/31/19 to 4/29/19  showed patient is using machine 5:20 hrs/night with 57% compliance and AHI 0.8 within this time frame. 17/30days with greater than 4 hours of machine use.   90% pressure 12.5 cm H20 on auto CPAP 9-13 cm H2O     Compliance download report from 4/1/20 to 4/30/20 reviewed today by me and showed patient is using machine 5:37 hrs/night with 63% compliance and AHI 0.6 within this time frame. 19/30days with greater than 4 hours of machine use. 30/30 days with any CPAP use. 90% pressure 12.4 cm H20 on auto CPAP 9-13    Compliance download report from 4/4/21 to 5/3/21 reviewed today by me and showed patient is using machine 6:18 hrs/night with 87% compliance and AHI 0.6 within this time frame. 26/30days with greater than 4 hours of machine use. 90% pressure 12.4 cm H20 on auto CPAP 9-13 cm H2O     Compliance download report from 3/29/22 to 4/27/22 reviewed today by me and showed patient is using machine 6:01 hrs/night with 73% compliance and AHI 0.6 within this time frame. 22/30days with greater than 4 hours of machine use. 90% pressure 12.1 cm H20 on auto CPAP 9-13 cm H2O    Assessment:       · Moderate DEE. Auto CPAP 9-13 cm H20. Optimal compliance and efficacy on review today. · Witnessed apnea- resolved with CPAP  · Hypersomnia - improved  · History of Coronary artery diease  · Obesity        Plan:       · Continue Auto CPAP 9-13 cm H2O  · Advised to use CPAP 6-8 hrs at night and during naps. · Again discussed good sleep habits   · Replacement of mask, tubing, head straps every 3-6 months or sooner if damaged. · Patient instructed to contact [email or web address] company for any mask, tubing or machine trouble shooting if problems arise. · Sleep hygiene  · Avoid sedatives, alcohol and caffeinated drinks at bed time. · No driving motorized vehicles or operating heavy machinery while fatigue, drowsy or sleepy. · Weight loss is also recommended as a long-term intervention. · Complications of DEE if not treated were discussed with patient patient to include systemic hypertension, pulmonary hypertension, cardiovascular morbidities, car accidents and all cause mortality.   · Patient education  regarding sleep tips and CPAP cleaning recommendations       Follow up 1 year, sooner if needed

## 2022-05-09 NOTE — PATIENT INSTRUCTIONS

## 2022-08-26 DIAGNOSIS — R76.0 ANTIPHOSPHOLIPID ANTIBODY POSITIVE: ICD-10-CM

## 2022-09-19 DIAGNOSIS — I25.10 CORONARY ARTERY DISEASE INVOLVING NATIVE CORONARY ARTERY OF NATIVE HEART WITHOUT ANGINA PECTORIS: ICD-10-CM

## 2022-09-19 RX ORDER — ATORVASTATIN CALCIUM 10 MG/1
TABLET, FILM COATED ORAL
Qty: 90 TABLET | Refills: 1 | Status: SHIPPED | OUTPATIENT
Start: 2022-09-19

## 2022-09-19 RX ORDER — AMLODIPINE BESYLATE 5 MG/1
TABLET ORAL
Qty: 90 TABLET | Refills: 1 | Status: SHIPPED | OUTPATIENT
Start: 2022-09-19

## 2022-10-04 DIAGNOSIS — N40.0 BENIGN PROSTATIC HYPERPLASIA, UNSPECIFIED WHETHER LOWER URINARY TRACT SYMPTOMS PRESENT: ICD-10-CM

## 2022-10-04 RX ORDER — FINASTERIDE 5 MG/1
TABLET, FILM COATED ORAL
Qty: 90 TABLET | Refills: 1 | Status: SHIPPED | OUTPATIENT
Start: 2022-10-04

## 2022-10-07 ENCOUNTER — TELEPHONE (OUTPATIENT)
Dept: CARDIOLOGY CLINIC | Age: 62
End: 2022-10-07

## 2022-10-07 NOTE — TELEPHONE ENCOUNTER
Pt would like to know if he needs to complete the cta chest abdomen pelvis w contrast that [facility] ordered. Pt has an upcoming ov with [facility] on 11/01/2022. Please advise. Oxybutynin Counseling:  I discussed with the patient the risks of oxybutynin including but not limited to skin rash, drowsiness, dry mouth, difficulty urinating, and blurred vision.

## 2022-10-12 ENCOUNTER — TELEPHONE (OUTPATIENT)
Dept: CARDIOLOGY CLINIC | Age: 62
End: 2022-10-12

## 2022-10-12 DIAGNOSIS — Z01.818 PRE-OP TESTING: Primary | ICD-10-CM

## 2022-10-12 NOTE — TELEPHONE ENCOUNTER
[facility] stated that pt is scheduled for a cta and they need a creatine lab put in pts chart. Pt is scheduled for 10/21/2022. Order can be placed in epic.

## 2022-10-21 ENCOUNTER — TELEPHONE (OUTPATIENT)
Dept: CARDIOLOGY CLINIC | Age: 62
End: 2022-10-21

## 2022-10-21 ENCOUNTER — HOSPITAL ENCOUNTER (OUTPATIENT)
Dept: CT IMAGING | Age: 62
Discharge: HOME | End: 2022-10-21
Payer: COMMERCIAL

## 2022-10-21 DIAGNOSIS — I77.810 ASCENDING AORTA DILATATION (HCC): ICD-10-CM

## 2022-10-21 LAB
GFR SERPL CREATININE-BSD FRML MDRD: >60 ML/MIN/{1.73_M2}
PERFORMED ON: ABNORMAL
POC CREATININE: 0.7 MG/DL (ref 0.8–1.3)
POC SAMPLE TYPE: ABNORMAL

## 2022-10-21 PROCEDURE — 6360000004 HC RX CONTRAST MEDICATION: Performed by: INTERNAL MEDICINE

## 2022-10-21 PROCEDURE — 82565 ASSAY OF CREATININE: CPT

## 2022-10-21 PROCEDURE — 74174 CTA ABD&PLVS W/CONTRAST: CPT

## 2022-10-21 RX ADMIN — IOPAMIDOL 75 ML: 755 INJECTION, SOLUTION INTRAVENOUS at 13:14

## 2022-10-21 NOTE — TELEPHONE ENCOUNTER
----- Message from Geetha Hua MD sent at 10/21/2022  2:17 PM EDT -----  The test does NOT show any major change from prior testing. Please inform the patient of the results.      Plan to recheck in 2 years

## 2022-11-01 ENCOUNTER — OFFICE VISIT (OUTPATIENT)
Dept: CARDIOLOGY CLINIC | Age: 62
End: 2022-11-01
Payer: COMMERCIAL

## 2022-11-01 VITALS
HEART RATE: 66 BPM | BODY MASS INDEX: 36.98 KG/M2 | SYSTOLIC BLOOD PRESSURE: 128 MMHG | OXYGEN SATURATION: 95 % | WEIGHT: 244 LBS | HEIGHT: 68 IN | DIASTOLIC BLOOD PRESSURE: 82 MMHG

## 2022-11-01 DIAGNOSIS — I25.10 CORONARY ARTERY DISEASE INVOLVING NATIVE CORONARY ARTERY OF NATIVE HEART WITHOUT ANGINA PECTORIS: ICD-10-CM

## 2022-11-01 DIAGNOSIS — Z72.0 TOBACCO ABUSE: Primary | ICD-10-CM

## 2022-11-01 DIAGNOSIS — I77.810 ASCENDING AORTA DILATATION (HCC): ICD-10-CM

## 2022-11-01 DIAGNOSIS — R07.9 CHEST PAIN, UNSPECIFIED TYPE: ICD-10-CM

## 2022-11-01 PROCEDURE — 99214 OFFICE O/P EST MOD 30 MIN: CPT | Performed by: INTERNAL MEDICINE

## 2022-11-01 RX ORDER — NITROGLYCERIN 0.4 MG/1
0.4 TABLET SUBLINGUAL EVERY 5 MIN PRN
Qty: 25 TABLET | Refills: 3 | Status: SHIPPED | OUTPATIENT
Start: 2022-11-01

## 2022-11-01 NOTE — PATIENT INSTRUCTIONS
Plan:  1. Discussed repeat CTA chest results stable ~ plan to re-assess in 2 years ascending aorta dilation  2. Tobacco Cessation ~ high risk factor heart attack or stroke  ~ 1-800-QUIT-NOW  3. Recommend physical exercise routine ~ weight loss encouraged   ~Get at least 150 minutes per week of moderate-intensity aerobic activity or 75 minutes per week of high intensity aerobic activity. Examples of moderate intensity activity- brisk walking, water aerobics, gardening, or dancing. Examples of high intensity activity- hiking, running, swimming laps, heavy yard work, playing tennis, or biking. 4. I recommend that the patient continue their currently prescribed medications. Their drug modifiable risk factors appear to be well controlled. I will continue to address the need/dosing of medications in future visits. 5. Follow up in one year or sooner if needed.

## 2022-11-01 NOTE — PROGRESS NOTES
[address] [facility]   Cardiovascular Evaluation    PATIENT: Lorna Toro  DATE: 2022  MRN: [id number]  CSN: [id number]  : 1960    Primary Care Doctor: Ayesha Taylor MD    Reason for evaluation:   Follow-up and Coronary Artery Disease      Subjective:    History of present illness on initial date of evaluation:   Lorna Toro is a 63 y.o. male patient who presents for cardiology follow up. He has a past medical history including coronary artery disease, obesity, ascending aorta dilation, and tobacco abuse. In 2019 he had an echocardiogram that showed an ejection fraction of 55% with a dilated aortic root. He then underwent a chest CT that showed the dilation to be 4.2 cm. Repeat Chest CTA on 10/13/20 that showed mild dilation of ascending aorta with no change in size, 4.2 cm. Echocardiogram completed and showed ejection fraction of 55%, mild left ventricular hypertrophy, mild dilation of ascending aorta and mild mitral regurgitation. Since last office visit he had CTA Chest to assess size of aortic root, no change from prior testing. He was hospitalized -22 after presenting with complaints of abdominal pain and bloody stools. GI consulted. Colonoscopy 22 mild diverticulosis and internal hemorrhoids. Discharged on antibiotic therapy and resumed Xarelto 20 mg daily. Today he states he is doing well. Patient currently denies any weight gain, edema, palpitations, chest pain, shortness of breath, dizziness, and syncope. He is taking medication as prescribed, tolerating well. Denies recent issues with bleeding or bruising. He states he continues to smoke however will have occasional cigarette. He states he struggles with his weight. He states he tries to stay active walking in his neighborhood around 2 miles. He denies limitations with this activity.      Patient Active Problem List   Diagnosis    Coronary artery disease    History of MTHFR mutation Antiphospholipid antibody positive    Moderate obstructive sleep apnea    Obesity (BMI 30-39. 9)    Primary osteoarthritis of left hip    Tobacco abuse    Ascending aorta dilatation (HCC)    GI bleed    BRBPR (bright red blood per rectum)         Cardiac Testing: I have reviewed the findings below. CATH: 4/2014   CONCLUSIONS:   1. Ectatic coronary arteries with organized laminar thrombus in the left   anterior descending. 2. Normal left ventricular function. 3. Normal hemodynamics. RECOMMENDATIONS:   1. At this point, the recommendation is to admit the patient to the hospital   at [facility]. 2. I am going to consult hematology for workup of hypercoagulable state. 3. Start aspirin and Plavix for additional antiplatelet therapy. 4. We will consider Coumadin or similar anticoagulants pending hematological   workup. In addition, I will start him on statin therapy. BYPASS:  VASCULAR:    Past Medical History:   has a past medical history of Antiphospholipid syndrome ([facility] [address] Blood clotting disorder ([facility] [address] Coronary artery disease, Hyperlipidemia, DEE on CPAP, Osteoarthritis of right hip, and Primary osteoarthritis of left hip. Surgical History:   has a past surgical history that includes skin biopsy; fracture surgery (Right, 2004); Cardiac catheterization (2014); Dental surgery (2015); Colonoscopy; Colonoscopy (5/2/16); joint replacement (Left, 2018); joint replacement (Right, 08/2020); and Colonoscopy (N/A, 3/8/2022). Social History:   reports that he has been smoking cigarettes. He started smoking about 42 years ago. He has a 15.00 pack-year smoking history. He has never used smokeless tobacco. He reports that he does not drink alcohol and does not use drugs. Family History:  No evidence for sudden cardiac death or premature CAD    Home Medications:  Reviewed and are listed in nursing record.  and/or listed below  Current Outpatient Medications   Medication Sig Dispense Refill nitroGLYCERIN (NITROSTAT) 0.4 MG SL tablet Place 1 tablet under the tongue every 5 minutes as needed for Chest pain 25 tablet 3    atorvastatin (LIPITOR) 10 MG tablet TAKE 1 TABLET NIGHTLY AT   BEDTIME 90 tablet 1    amLODIPine (NORVASC) 5 MG tablet TAKE 1 TABLET DAILY 90 tablet 1    rivaroxaban (XARELTO) 20 MG TABS tablet TAKE 1 TABLET BY MOUTH EVERY DAY 90 tablet 1    aspirin 81 MG chewable tablet CHEW 1 TABLET DAILY 90 tablet 1    finasteride (PROSCAR) 5 MG tablet TAKE 1 TABLET DAILY 90 tablet 1     No current facility-administered medications for this visit. Allergies:  Lorazepam     Review of Systems:   All 14 point review of symptoms completed. Pertinent positives identified in the HPI, all other review of symptoms negative as below.     Review of Systems - History obtained from the patient  General ROS: negative for - chills, fever or night sweats  Psychological ROS: negative for - disorientation or hallucinations  Ophthalmic ROS: negative for - dry eyes, eye pain or loss of vision  ENT ROS: negative for - nasal discharge or sore throat  Allergy and Immunology ROS: negative for - hives or itchy/watery eyes  Hematological and Lymphatic ROS: negative for - jaundice or night sweats  Endocrine ROS: negative for - mood swings or temperature intolerance  Breast ROS: deferred  Respiratory ROS: negative for - hemoptysis or stridor  Cardiovascular ROS: negative for - chest pain, dyspnea on exertion or palpitations  Gastrointestinal ROS: no abdominal pain, change in bowel habits, or black or bloody stools  Genito-Urinary ROS: no dysuria, trouble voiding, or hematuria  Musculoskeletal ROS: negative for - gait disturbance, joint pain or joint stiffness  Neurological ROS: negative for - seizures or speech problems  Dermatological ROS: negative for - rash or skin lesion changes        Physical Examination:    Vitals:    11/01/22 0839   BP: 128/82   Pulse: 66   SpO2: 95%      Weight: 244 lb (110.7 kg)     Wt Readings from Last 3 Encounters:   11/01/22 244 lb (110.7 kg)   05/09/22 239 lb (108.4 kg)   04/05/22 239 lb (108.4 kg)       General Appearance:  Alert, cooperative, no distress, appears stated age   Head:  Normocephalic, without obvious abnormality, atraumatic   Eyes:  PERRL, conjunctiva/corneas clear       Nose: Nares normal, no drainage or sinus tenderness   Throat: Lips, mucosa, and tongue normal   Neck: Supple, symmetrical, trachea midline, no adenopathy, thyroid: not enlarged, symmetric, no tenderness/mass/nodules, no carotid bruit or JVD       Lungs:   Coarse breath sounds, respirations unlabored   Chest Wall:  No tenderness or deformity   Heart:  Regular rhythm and normal rate; S1, S2 are normal; no murmur noted; no rub or gallop   Abdomen:   Soft, non-tender, bowel sounds active all four quadrants,  no masses, no organomegaly           Extremities: Extremities normal, atraumatic, no cyanosis or edema   Pulses: 2+ and symmetric   Skin: Skin color, texture, turgor normal, no rashes or lesions   Pysch: Normal mood and affect   Neurologic: Normal gross motor and sensory exam.         Labs  CBC:   Lab Results   Component Value Date/Time    WBC 4.3 04/05/2022 08:32 AM    RBC 4.88 04/05/2022 08:32 AM    RBC 4.97 04/13/2017 08:28 AM    HGB 14.7 04/05/2022 08:32 AM    HCT 42.4 04/05/2022 08:32 AM    MCV 86.8 04/05/2022 08:32 AM    RDW 13.8 04/05/2022 08:32 AM     04/05/2022 08:32 AM     CMP:    Lab Results   Component Value Date/Time     04/05/2022 08:32 AM    K 3.6 04/05/2022 08:32 AM    K 3.8 03/07/2022 02:55 AM     04/05/2022 08:32 AM    CO2 21 04/05/2022 08:32 AM    BUN 11 04/05/2022 08:32 AM    CREATININE 0.7 10/21/2022 01:13 PM    CREATININE 0.7 04/05/2022 08:32 AM    GFRAA >60 04/05/2022 08:32 AM    GFRAA >60 08/04/2011 08:41 AM    AGRATIO 2.4 04/05/2022 08:32 AM    LABGLOM >60 10/21/2022 01:13 PM    GLUCOSE 104 04/05/2022 08:32 AM    GLUCOSE 103 04/02/2015 09:19 AM    PROT 6.5 04/05/2022 08:32 AM PROT 7.4 04/02/2015 09:19 AM    CALCIUM 9.3 04/05/2022 08:32 AM    BILITOT 0.3 04/05/2022 08:32 AM    ALKPHOS 65 04/05/2022 08:32 AM    AST 19 04/05/2022 08:32 AM    ALT 31 04/05/2022 08:32 AM     PT/INR:    No components found for: PTPATIENT,  PTINR  Lab Results   Component Value Date    TROPONINI <0.01 05/10/2019     No components found for: CHLPL  Lab Results   Component Value Date    TRIG 106 09/16/2019    TRIG 124 10/13/2016    TRIG 114 09/30/2015     Lab Results   Component Value Date    HDL 28 (L) 04/05/2022    HDL 32 (L) 09/16/2019    HDL 29 (L) 10/13/2016     Lab Results   Component Value Date    LDLCALC 84 04/05/2022    LDLCALC 47 09/16/2019    LDLCALC 66 09/30/2015     Lab Results   Component Value Date    LABVLDL 47 04/05/2022    LABVLDL 21 09/16/2019    LABVLDL 23 09/30/2015     Lab Results   Component Value Date    ALT 31 04/05/2022    AST 19 04/05/2022    ALKPHOS 65 04/05/2022    BILITOT 0.3 04/05/2022         Assessment:  63 y.o. patient with:  1. Coronary artery diease    ~laminar thrombus within the LAD   ~stable  2. Obesity  3. Tobacco abuse  4. Ascending aorta  enlargement    Plan:  1. Discussed repeat CTA chest results stable ~ plan to re-assess in 2 years ascending aorta dilation  2. Tobacco Cessation ~ high risk factor heart attack or stroke  ~ 1-800-QUIT-NOW  3. Recommend physical exercise routine ~ weight loss encouraged   ~Get at least 150 minutes per week of moderate-intensity aerobic activity or 75 minutes per week of high intensity aerobic activity. Examples of moderate intensity activity- brisk walking, water aerobics, gardening, or dancing. Examples of high intensity activity- hiking, running, swimming laps, heavy yard work, playing tennis, or biking. 4. I recommend that the patient continue their currently prescribed medications. Their drug modifiable risk factors appear to be well controlled. I will continue to address the need/dosing of medications in future visits.    5. Follow up in one year or sooner if needed. Scribe's attestation: This note was scribed in the presence of Roderick Vaughn by Yuliana Velasco RN     I, Dr. Susi Kate, personally performed the services described in this documentation, as scribed by the above signed scribe in my presence. It is both accurate and complete to my knowledge. I agree with the details independently gathered by the clinical support staff, while the remaining scribed note accurately describes my personal service to the patient. Medical Decision Making: The following items were considered in medical decision making:  Independent review of images  Review / order clinical lab tests  Review / order radiology tests  Decision to obtain old records  Review and summation of old records as accessed [facility] (a summary of my findings in these old records)      Time Based Itemization  A total of 30 minutes was spent on today's patient encounter. If applicable, non-patient-facing activities:  (X)Preparing to see the patient and reviewing records  (X) Individual interpretation of results  ( ) Discussion or coordination of care with other health care professionals  () Ordering of unique tests, medications, or procedures  (X) Documentation within the EHR          All questions and concerns were addressed to the patient/family. Alternatives to my treatment were discussed. The note was completed using EMR. Every effort was made to ensure accuracy; however, inadvertent computerized transcription errors may be present.     Susi Kate MD, [address], [place], [place]  [phone] [facility]  [phone] [facility]  11/1/2022  10:27 AM

## 2023-01-27 DIAGNOSIS — R76.0 ANTIPHOSPHOLIPID ANTIBODY POSITIVE: ICD-10-CM

## 2023-02-24 ENCOUNTER — TELEMEDICINE (OUTPATIENT)
Dept: FAMILY MEDICINE CLINIC | Age: 63
End: 2023-02-24
Payer: COMMERCIAL

## 2023-02-24 DIAGNOSIS — J40 BRONCHITIS: Primary | ICD-10-CM

## 2023-02-24 PROCEDURE — 99214 OFFICE O/P EST MOD 30 MIN: CPT | Performed by: STUDENT IN AN ORGANIZED HEALTH CARE EDUCATION/TRAINING PROGRAM

## 2023-02-24 RX ORDER — METHYLPREDNISOLONE 4 MG/1
TABLET ORAL
Qty: 1 KIT | Refills: 0 | Status: SHIPPED | OUTPATIENT
Start: 2023-02-24 | End: 2023-03-02

## 2023-02-24 RX ORDER — AZITHROMYCIN 250 MG/1
250 TABLET, FILM COATED ORAL SEE ADMIN INSTRUCTIONS
Qty: 6 TABLET | Refills: 0 | Status: SHIPPED | OUTPATIENT
Start: 2023-02-24 | End: 2023-03-01

## 2023-02-24 SDOH — ECONOMIC STABILITY: HOUSING INSECURITY
IN THE LAST 12 MONTHS, WAS THERE A TIME WHEN YOU DID NOT HAVE A STEADY PLACE TO SLEEP OR SLEPT IN A SHELTER (INCLUDING NOW)?: NO

## 2023-02-24 SDOH — ECONOMIC STABILITY: TRANSPORTATION INSECURITY
IN THE PAST 12 MONTHS, HAS LACK OF TRANSPORTATION KEPT YOU FROM MEETINGS, WORK, OR FROM GETTING THINGS NEEDED FOR DAILY LIVING?: NO

## 2023-02-24 SDOH — ECONOMIC STABILITY: INCOME INSECURITY: HOW HARD IS IT FOR YOU TO PAY FOR THE VERY BASICS LIKE FOOD, HOUSING, MEDICAL CARE, AND HEATING?: NOT HARD AT ALL

## 2023-02-24 SDOH — ECONOMIC STABILITY: FOOD INSECURITY: WITHIN THE PAST 12 MONTHS, YOU WORRIED THAT YOUR FOOD WOULD RUN OUT BEFORE YOU GOT MONEY TO BUY MORE.: NEVER TRUE

## 2023-02-24 SDOH — ECONOMIC STABILITY: FOOD INSECURITY: WITHIN THE PAST 12 MONTHS, THE FOOD YOU BOUGHT JUST DIDN'T LAST AND YOU DIDN'T HAVE MONEY TO GET MORE.: NEVER TRUE

## 2023-02-24 ASSESSMENT — PATIENT HEALTH QUESTIONNAIRE - PHQ9
SUM OF ALL RESPONSES TO PHQ QUESTIONS 1-9: 1
1. LITTLE INTEREST OR PLEASURE IN DOING THINGS: 0
2. FEELING DOWN, DEPRESSED OR HOPELESS: 1
SUM OF ALL RESPONSES TO PHQ QUESTIONS 1-9: 1
SUM OF ALL RESPONSES TO PHQ9 QUESTIONS 1 & 2: 1
SUM OF ALL RESPONSES TO PHQ QUESTIONS 1-9: 1
SUM OF ALL RESPONSES TO PHQ QUESTIONS 1-9: 1

## 2023-02-24 NOTE — PROGRESS NOTES
Lorna Toro (:  1960) is a Established patient, here for evaluation of the following:    Assessment & Plan   Below is the assessment and plan developed based on review of pertinent history, physical exam, labs, studies, and medications. 1. Bronchitis  -     methylPREDNISolone (MEDROL DOSEPACK) 4 MG tablet; Take by mouth per package instructions, Disp-1 kit, R-0Normal  -     azithromycin (ZITHROMAX) 250 MG tablet; Take 1 tablet by mouth See Admin Instructions for 5 days 500mg on day 1 followed by 250mg on days 2 - 5, Disp-6 tablet, R-0Normal  Discussed with patient, and he believes that this is the same symptoms that he experiences every time he gets bronchitis. States that Z-Jasper and Medrol Dosepak is helped in the past.  Given that patient has had symptoms for several days now, would warrant nonconservative intervention at this time. Will provide the above medications and patient will follow-up with PCP as needed for the above concern. No follow-ups on file. Subjective   HPI  Patient is a 80-year-old male who presents over audiovisual communication to discuss recent sick symptoms. Patient reports that he has a cough with some ear fullness, initially on the left, now on the right. He reports mild shortness of breath, congestion, rhinorrhea, chest congestion. Patient states that he has had problems with bronchitis in the past and that this Chest congestion feels like that. He reports that he took a COVID test and was negative. He denies fever, nausea, vomiting, chest pain, constipation, diarrhea, changes in sensation of taste or smell. He has not had any exposure to anyone else who has been sick. Review of Systems   All other systems reviewed and are negative. Objective   Patient-Reported Vitals: None  No data recorded     Physical Exam  Cardiovascular:      Comments: Patient appears well perfused  Pulmonary:      Comments: Patient not dyspneic to conversation.   No audible wheezing.  Neurological:      Mental Status: He is alert.         On this date 2/24/2023 I have spent 30 minutes reviewing previous notes, test results and face to face (virtual) with the patient discussing the diagnosis and importance of compliance with the treatment plan as well as documenting on the day of the visit.    Sameer Hernandes, was evaluated through a synchronous (real-time) audio-video encounter. The patient (or guardian if applicable) is aware that this is a billable service, which includes applicable co-pays. This Virtual Visit was conducted with patient's (and/or legal guardian's) consent. The visit was conducted pursuant to the emergency declaration under the [place]posito Act and the National Emergencies Act, 1135 waiver authority and the Coronavirus Preparedness and Response Supplemental Appropriations Act.  Patient identification was verified, and a caregiver was present when appropriate.   The patient was located at Home: [address] [place], [place] [address]  Provider was located at Facility (Appt Dept): [address] [place], [place] [address] [place] [address]         --Diallo Howe MD

## 2023-03-18 DIAGNOSIS — I25.10 CORONARY ARTERY DISEASE INVOLVING NATIVE CORONARY ARTERY OF NATIVE HEART WITHOUT ANGINA PECTORIS: ICD-10-CM

## 2023-03-20 RX ORDER — AMLODIPINE BESYLATE 5 MG/1
TABLET ORAL
Qty: 90 TABLET | Refills: 1 | Status: SHIPPED | OUTPATIENT
Start: 2023-03-20

## 2023-03-20 RX ORDER — ATORVASTATIN CALCIUM 10 MG/1
TABLET, FILM COATED ORAL
Qty: 90 TABLET | Refills: 1 | Status: SHIPPED | OUTPATIENT
Start: 2023-03-20

## 2023-04-02 DIAGNOSIS — N40.0 BENIGN PROSTATIC HYPERPLASIA, UNSPECIFIED WHETHER LOWER URINARY TRACT SYMPTOMS PRESENT: ICD-10-CM

## 2023-04-03 RX ORDER — FINASTERIDE 5 MG/1
TABLET, FILM COATED ORAL
Qty: 90 TABLET | Refills: 1 | Status: SHIPPED | OUTPATIENT
Start: 2023-04-03

## 2023-05-08 ENCOUNTER — OFFICE VISIT (OUTPATIENT)
Dept: PULMONOLOGY | Age: 63
End: 2023-05-08
Payer: COMMERCIAL

## 2023-05-08 VITALS
SYSTOLIC BLOOD PRESSURE: 127 MMHG | RESPIRATION RATE: 16 BRPM | HEIGHT: 68 IN | HEART RATE: 75 BPM | DIASTOLIC BLOOD PRESSURE: 78 MMHG | BODY MASS INDEX: 38.04 KG/M2 | OXYGEN SATURATION: 93 % | WEIGHT: 251 LBS

## 2023-05-08 DIAGNOSIS — G47.33 MODERATE OBSTRUCTIVE SLEEP APNEA: Primary | ICD-10-CM

## 2023-05-08 DIAGNOSIS — E66.9 OBESITY (BMI 30-39.9): ICD-10-CM

## 2023-05-08 DIAGNOSIS — Z71.89 CPAP USE COUNSELING: ICD-10-CM

## 2023-05-08 DIAGNOSIS — I10 PRIMARY HYPERTENSION: ICD-10-CM

## 2023-05-08 PROCEDURE — 3074F SYST BP LT 130 MM HG: CPT | Performed by: NURSE PRACTITIONER

## 2023-05-08 PROCEDURE — 3078F DIAST BP <80 MM HG: CPT | Performed by: NURSE PRACTITIONER

## 2023-05-08 PROCEDURE — 99214 OFFICE O/P EST MOD 30 MIN: CPT | Performed by: NURSE PRACTITIONER

## 2023-05-08 ASSESSMENT — SLEEP AND FATIGUE QUESTIONNAIRES
HOW LIKELY ARE YOU TO NOD OFF OR FALL ASLEEP IN A CAR, WHILE STOPPED FOR A FEW MINUTES IN TRAFFIC: 0
HOW LIKELY ARE YOU TO NOD OFF OR FALL ASLEEP WHILE SITTING QUIETLY AFTER LUNCH WITHOUT ALCOHOL: 1
HOW LIKELY ARE YOU TO NOD OFF OR FALL ASLEEP WHILE LYING DOWN TO REST IN THE AFTERNOON WHEN CIRCUMSTANCES PERMIT: 2
HOW LIKELY ARE YOU TO NOD OFF OR FALL ASLEEP WHILE WATCHING TV: 1
HOW LIKELY ARE YOU TO NOD OFF OR FALL ASLEEP WHILE SITTING INACTIVE IN A PUBLIC PLACE: 0
HOW LIKELY ARE YOU TO NOD OFF OR FALL ASLEEP WHEN YOU ARE A PASSENGER IN A CAR FOR AN HOUR WITHOUT A BREAK: 0
NECK CIRCUMFERENCE (INCHES): 16
HOW LIKELY ARE YOU TO NOD OFF OR FALL ASLEEP WHILE SITTING AND TALKING TO SOMEONE: 0
ESS TOTAL SCORE: 7
HOW LIKELY ARE YOU TO NOD OFF OR FALL ASLEEP WHILE SITTING AND READING: 3

## 2023-05-08 NOTE — PROGRESS NOTES
CHIEF COMPLAINT: Sleep apnea    Edis Cortes is a 66 y.o. male in office for DEE follow up. States he is doing well with CPAP. States sometimes fall asleep downstairs. Patient is using CPAP   5-6 hrs/night. Using humidifier. No snoring on CPAP. The pressure is well tolerated. The mask is comfortable- full face. No mask leak. No significant daytime sleepiness. Denies nodding off when driving. No dry nose or throat. Minimal fatigue. Bedtime is 9-11 pm sometimes 3 am and rise time is 730 am sometimes 10 am. Sleep onset is usually 10 minutes. Wakes up 0-1 times at night total. No nocturia. It takes few minutes to fall back a sleep. Rare naps during the day. No headache in am. No weight gain. 2-3 caffienated beverages during the day. No alcohol.  ESS is 7          Past Medical History:   Diagnosis Date    Antiphospholipid syndrome ([facility] [address]     Blood clotting disorder ([facility] [address]     Coronary artery disease 4/23/2014    Dr. Luanne Hwang hematology and Dr. Kishan Retana Cardiology    Hyperlipidemia     DEE on CPAP     Osteoarthritis of right hip     Primary osteoarthritis of left hip 10/9/2017     Past Surgical History:   Procedure Laterality Date    CARDIAC CATHETERIZATION  2014    COLONOSCOPY      COLONOSCOPY  5/2/16    colon polyp, biopsy ascending lesion    COLONOSCOPY N/A 3/8/2022    COLONOSCOPY DIAGNOSTIC performed by Parth Salguero MD at [address]  2015    FRACTURE SURGERY Right 2004    tib-fib    JOINT REPLACEMENT Left 2018    left hip    JOINT REPLACEMENT Right 08/2020    SKIN BIOPSY       Allergies   Allergen Reactions    Lorazepam      Used as a pre-op with L hip Repl, had an anxiety rx        Current Medications:    Current Outpatient Medications:     amLODIPine (NORVASC) 5 MG tablet, TAKE 1 TABLET DAILY, Disp: 90 tablet, Rfl: 1    atorvastatin (LIPITOR) 10 MG tablet, TAKE 1 TABLET NIGHTLY AT   BEDTIME, Disp: 90 tablet, Rfl: 1    rivaroxaban (XARELTO) 20 MG TABS tablet, TAKE 1 TABLET BY MOUTH EVERY DAY,

## 2023-07-18 ENCOUNTER — TELEPHONE (OUTPATIENT)
Dept: FAMILY MEDICINE CLINIC | Age: 63
End: 2023-07-18

## 2023-07-18 DIAGNOSIS — Z12.5 SCREENING FOR PROSTATE CANCER: Primary | ICD-10-CM

## 2023-07-18 NOTE — TELEPHONE ENCOUNTER
Patient contacted the office he is seeing his Urinologist on Friday and they are req that he has a PSA screening done before that appt, can an order be placed for this, please advise [phone]

## 2023-07-19 DIAGNOSIS — Z12.5 SCREENING FOR PROSTATE CANCER: ICD-10-CM

## 2023-07-19 LAB — PSA SERPL DL<=0.01 NG/ML-MCNC: 0.68 NG/ML (ref 0–4)

## 2023-09-07 DIAGNOSIS — I25.10 CORONARY ARTERY DISEASE INVOLVING NATIVE CORONARY ARTERY OF NATIVE HEART WITHOUT ANGINA PECTORIS: ICD-10-CM

## 2023-09-07 DIAGNOSIS — N40.0 BENIGN PROSTATIC HYPERPLASIA, UNSPECIFIED WHETHER LOWER URINARY TRACT SYMPTOMS PRESENT: ICD-10-CM

## 2023-09-07 RX ORDER — ATORVASTATIN CALCIUM 10 MG/1
TABLET, FILM COATED ORAL
Qty: 90 TABLET | Refills: 1 | Status: SHIPPED | OUTPATIENT
Start: 2023-09-07

## 2023-09-07 RX ORDER — FINASTERIDE 5 MG/1
TABLET, FILM COATED ORAL
Qty: 90 TABLET | Refills: 1 | Status: SHIPPED | OUTPATIENT
Start: 2023-09-07

## 2023-09-07 RX ORDER — AMLODIPINE BESYLATE 5 MG/1
TABLET ORAL
Qty: 90 TABLET | Refills: 1 | Status: SHIPPED | OUTPATIENT
Start: 2023-09-07

## 2023-09-07 NOTE — TELEPHONE ENCOUNTER
Last Office Visit  -  2/24/23  Next Office Visit  -  n/a    Last Filled  -    Last UDS -    Contract -

## 2023-09-07 NOTE — TELEPHONE ENCOUNTER
Last Office Visit  -  2/24/23  Next Office Visit  -  NA    Last Filled  -  4/3/23  FINASTERIDE  TAB 5MG

## 2023-10-27 ENCOUNTER — OFFICE VISIT (OUTPATIENT)
Dept: CARDIOLOGY CLINIC | Age: 63
End: 2023-10-27
Payer: COMMERCIAL

## 2023-10-27 VITALS
HEART RATE: 67 BPM | BODY MASS INDEX: 35.84 KG/M2 | WEIGHT: 236.5 LBS | HEIGHT: 68 IN | DIASTOLIC BLOOD PRESSURE: 78 MMHG | OXYGEN SATURATION: 96 % | SYSTOLIC BLOOD PRESSURE: 134 MMHG

## 2023-10-27 DIAGNOSIS — Z15.89 HISTORY OF MTHFR MUTATION: ICD-10-CM

## 2023-10-27 DIAGNOSIS — I77.810 ASCENDING AORTA DILATATION (HCC): ICD-10-CM

## 2023-10-27 DIAGNOSIS — R76.0 ANTIPHOSPHOLIPID ANTIBODY POSITIVE: ICD-10-CM

## 2023-10-27 DIAGNOSIS — Z72.0 TOBACCO ABUSE: ICD-10-CM

## 2023-10-27 DIAGNOSIS — I25.10 CORONARY ARTERY DISEASE, UNSPECIFIED VESSEL OR LESION TYPE, UNSPECIFIED WHETHER ANGINA PRESENT, UNSPECIFIED WHETHER NATIVE OR TRANSPLANTED HEART: Primary | ICD-10-CM

## 2023-10-27 PROCEDURE — 99214 OFFICE O/P EST MOD 30 MIN: CPT | Performed by: INTERNAL MEDICINE

## 2023-10-27 PROCEDURE — 93000 ELECTROCARDIOGRAM COMPLETE: CPT | Performed by: INTERNAL MEDICINE

## 2023-10-27 NOTE — PROGRESS NOTES
[address]   Cardiovascular Evaluation    PATIENT: Refugio Barreto  DATE: 10/27/2023  MRN: [id number]  CSN: [id number]  : 1960    Primary Care Doctor: Andres Gomez MD    Reason for evaluation:   Follow-up and Coronary Artery Disease        Subjective:    History of present illness on initial date of evaluation:   Refugio Barrteo is a 66 y.o. male patient who presents for cardiology follow up. He has a past medical history including coronary artery disease, obesity, ascending aorta dilation, and tobacco abuse. In 2019 he had an echocardiogram that showed an ejection fraction of 55% with a dilated aortic root. He then underwent a chest CT that showed the dilation to be 4.2 cm. Repeat Chest CTA on 10/13/20 that showed mild dilation of ascending aorta with no change in size, 4.2 cm. Echocardiogram completed and showed ejection fraction of 55%, mild left ventricular hypertrophy, mild dilation of ascending aorta and mild mitral regurgitation. He had CTA Chest to assess size of aortic root, no change from prior testing. He was hospitalized -22 after presenting with complaints of abdominal pain and bloody stools. GI consulted. Colonoscopy 22 mild diverticulosis and internal hemorrhoids. Discharged on antibiotic therapy and resumed Xarelto 20 mg daily. Since last office visit he had a CTA chest 10/21/22 abdominal aorta no aneurysm. Today he states he is doing well. He quit smoking close to 1 year ago. He states occasionally will slip up and smoke a cigarette. Patient currently denies any weight gain, edema, palpitations, chest pain, shortness of breath, dizziness, and syncope. He is taking medications as prescribed, tolerating well. He states he has a goal to lose weight and completely stop smoking.      Patient Active Problem List   Diagnosis    Coronary artery disease    History of MTHFR mutation    Antiphospholipid antibody positive    Moderate obstructive sleep

## 2023-10-27 NOTE — PATIENT INSTRUCTIONS
Plan:  Plan on CTA Chest 10/2024  ~ ascending aorta dilation  I recommend that the patient continue their currently prescribed medications. Their drug modifiable risk factors appear to be well controlled. I will continue to address the need/dosing of medications in future visits. Patient given detailed instructions on addressing diet, regular exercise, weight control, smoking abstention, medication compliance, and stress minimization. The patient was provided written and verbal instructions regarding risk factor modification. Follow up in 1 year.

## 2024-02-07 ENCOUNTER — OFFICE VISIT (OUTPATIENT)
Dept: FAMILY MEDICINE CLINIC | Age: 64
End: 2024-02-07
Payer: COMMERCIAL

## 2024-02-07 VITALS
DIASTOLIC BLOOD PRESSURE: 74 MMHG | HEART RATE: 73 BPM | SYSTOLIC BLOOD PRESSURE: 120 MMHG | OXYGEN SATURATION: 96 % | WEIGHT: 239 LBS | HEIGHT: 68 IN | BODY MASS INDEX: 36.22 KG/M2

## 2024-02-07 DIAGNOSIS — M65.4 DE QUERVAIN'S TENOSYNOVITIS, LEFT: Primary | ICD-10-CM

## 2024-02-07 PROCEDURE — 99214 OFFICE O/P EST MOD 30 MIN: CPT | Performed by: FAMILY MEDICINE

## 2024-02-07 RX ORDER — CELECOXIB 200 MG/1
200 CAPSULE ORAL DAILY
Qty: 10 CAPSULE | Refills: 0 | Status: SHIPPED | OUTPATIENT
Start: 2024-02-07

## 2024-02-07 NOTE — PROGRESS NOTES
Sameer Hernandes (:  1960) is a 63 y.o. male,Established patient, here for evaluation of the following chief complaint(s):  Hand Pain (X 2 weeks )         ASSESSMENT/PLAN:  1. De Quervain's tenosynovitis, left  -     celecoxib (CELEBREX) 200 MG capsule; Take 1 capsule by mouth daily, Disp-10 capsule, R-0Normal    Stretching exercises given.  If does not improve consider hand physical therapy.  No follow-ups on file.         Subjective   SUBJECTIVE/OBJECTIVE:  Sameer Hernandes is a 63 y.o. male. Patient presents with:  Hand Pain: X 2 weeks       63-year-old male who has had right hand pain for approximately 2 weeks.  Patient notes that he does overuse his right hand and does a lot of his activities with his right hand.  Notes that picking up things with his right hand causes more pain.  Resting and not using his right hand does help somewhat.  He has not been using any significant over-the-counter medicines because he has a history of issues with his GI system.  Patient denies any numbness or tingling in the hand.  The patients PMH, surgical history, family history, medications, allergies were all reviewed and updated as appropriate today.          Review of Systems       Objective   Physical Exam  Vitals and nursing note reviewed.   Constitutional:       Appearance: Normal appearance. He is well-developed.   HENT:      Head: Normocephalic and atraumatic.      Right Ear: External ear normal.      Left Ear: External ear normal.      Nose: Nose normal.   Eyes:      Conjunctiva/sclera: Conjunctivae normal.      Pupils: Pupils are equal, round, and reactive to light.   Cardiovascular:      Rate and Rhythm: Normal rate and regular rhythm.      Heart sounds: Normal heart sounds. No murmur heard.     No friction rub. No gallop.   Pulmonary:      Effort: Pulmonary effort is normal. No respiratory distress.      Breath sounds: Normal breath sounds. No wheezing.   Abdominal:      General: Bowel sounds are normal. There

## 2024-03-05 DIAGNOSIS — I25.10 CORONARY ARTERY DISEASE INVOLVING NATIVE CORONARY ARTERY OF NATIVE HEART WITHOUT ANGINA PECTORIS: ICD-10-CM

## 2024-03-05 RX ORDER — ATORVASTATIN CALCIUM 10 MG/1
TABLET, FILM COATED ORAL
Qty: 90 TABLET | Refills: 1 | Status: SHIPPED | OUTPATIENT
Start: 2024-03-05

## 2024-03-05 RX ORDER — AMLODIPINE BESYLATE 5 MG/1
TABLET ORAL
Qty: 90 TABLET | Refills: 1 | Status: SHIPPED | OUTPATIENT
Start: 2024-03-05

## 2024-03-05 NOTE — TELEPHONE ENCOUNTER
Last Office Visit  -  2/7/24  Next Office Visit  -  n/a    Last Filled  -  9/7/23  Last UDS -    Contract -

## 2024-03-20 DIAGNOSIS — N40.0 BENIGN PROSTATIC HYPERPLASIA, UNSPECIFIED WHETHER LOWER URINARY TRACT SYMPTOMS PRESENT: ICD-10-CM

## 2024-03-20 RX ORDER — FINASTERIDE 5 MG/1
TABLET, FILM COATED ORAL
Qty: 90 TABLET | Refills: 1 | Status: SHIPPED | OUTPATIENT
Start: 2024-03-20

## 2024-05-13 ENCOUNTER — TELEPHONE (OUTPATIENT)
Dept: PULMONOLOGY | Age: 64
End: 2024-05-13

## 2024-05-13 ENCOUNTER — OFFICE VISIT (OUTPATIENT)
Dept: PULMONOLOGY | Age: 64
End: 2024-05-13
Payer: COMMERCIAL

## 2024-05-13 VITALS
SYSTOLIC BLOOD PRESSURE: 138 MMHG | HEART RATE: 70 BPM | HEIGHT: 68 IN | OXYGEN SATURATION: 94 % | WEIGHT: 235 LBS | BODY MASS INDEX: 35.61 KG/M2 | DIASTOLIC BLOOD PRESSURE: 80 MMHG

## 2024-05-13 DIAGNOSIS — I10 PRIMARY HYPERTENSION: ICD-10-CM

## 2024-05-13 DIAGNOSIS — Z71.89 CPAP USE COUNSELING: ICD-10-CM

## 2024-05-13 DIAGNOSIS — E66.9 OBESITY (BMI 30-39.9): ICD-10-CM

## 2024-05-13 DIAGNOSIS — G47.33 MODERATE OBSTRUCTIVE SLEEP APNEA: Primary | ICD-10-CM

## 2024-05-13 PROCEDURE — 3075F SYST BP GE 130 - 139MM HG: CPT | Performed by: NURSE PRACTITIONER

## 2024-05-13 PROCEDURE — 3079F DIAST BP 80-89 MM HG: CPT | Performed by: NURSE PRACTITIONER

## 2024-05-13 PROCEDURE — 99214 OFFICE O/P EST MOD 30 MIN: CPT | Performed by: NURSE PRACTITIONER

## 2024-05-13 ASSESSMENT — SLEEP AND FATIGUE QUESTIONNAIRES
HOW LIKELY ARE YOU TO NOD OFF OR FALL ASLEEP WHILE SITTING QUIETLY AFTER LUNCH WITHOUT ALCOHOL: WOULD NEVER DOZE
HOW LIKELY ARE YOU TO NOD OFF OR FALL ASLEEP WHILE SITTING AND TALKING TO SOMEONE: WOULD NEVER DOZE
HOW LIKELY ARE YOU TO NOD OFF OR FALL ASLEEP WHILE WATCHING TV: SLIGHT CHANCE OF DOZING
HOW LIKELY ARE YOU TO NOD OFF OR FALL ASLEEP WHILE SITTING AND READING: HIGH CHANCE OF DOZING
HOW LIKELY ARE YOU TO NOD OFF OR FALL ASLEEP IN A CAR, WHILE STOPPED FOR A FEW MINUTES IN TRAFFIC: WOULD NEVER DOZE
ESS TOTAL SCORE: 7
HOW LIKELY ARE YOU TO NOD OFF OR FALL ASLEEP WHILE SITTING INACTIVE IN A PUBLIC PLACE: WOULD NEVER DOZE
HOW LIKELY ARE YOU TO NOD OFF OR FALL ASLEEP WHEN YOU ARE A PASSENGER IN A CAR FOR AN HOUR WITHOUT A BREAK: WOULD NEVER DOZE
HOW LIKELY ARE YOU TO NOD OFF OR FALL ASLEEP WHILE LYING DOWN TO REST IN THE AFTERNOON WHEN CIRCUMSTANCES PERMIT: HIGH CHANCE OF DOZING
NECK CIRCUMFERENCE (INCHES): 17

## 2024-05-13 NOTE — PROGRESS NOTES
increase use.  Recommend at least 7, preferably 8 hours of sleep nightly with CPAP, discussed importance of adequate sleep time  Again discussed good sleep habits   Replacement of mask, tubing, head straps every 3-6 months or sooner if damaged.   Patient instructed to contact [employer] company for any mask, tubing or machine trouble shooting if problems arise.  Sleep hygiene  Decrease caffeine intake  Avoid sedatives, alcohol and caffeinated drinks at bed time.  No driving motorized vehicles or operating heavy machinery while fatigue, drowsy or sleepy.   Weight loss is also recommended as a long-term intervention.   Complications of DEE if not treated were discussed with patient patient to include systemic hypertension, pulmonary hypertension, cardiovascular morbidities, car accidents and all cause mortality.  Patient education  regarding sleep tips and CPAP cleaning recommendations   Continue blood pressure medications as ordered by treating provider- treatment of DEE can lower blood pressure by levels that are clinically significant.        Follow up 1 year, sooner if needed

## 2024-05-13 NOTE — TELEPHONE ENCOUNTER
Faxed full face mask order, CR, and ov notes to [facility] at [phone] via [employer]ax.

## 2024-08-13 ENCOUNTER — HOSPITAL ENCOUNTER (OUTPATIENT)
Dept: CT IMAGING | Age: 64
Discharge: HOME | End: 2024-08-13
Payer: COMMERCIAL

## 2024-08-13 DIAGNOSIS — N20.0 CALCULUS OF KIDNEY: ICD-10-CM

## 2024-08-13 DIAGNOSIS — R10.12 LEFT UPPER QUADRANT PAIN: ICD-10-CM

## 2024-08-13 DIAGNOSIS — R31.0 GROSS HEMATURIA: ICD-10-CM

## 2024-08-13 PROCEDURE — 74176 CT ABD & PELVIS W/O CONTRAST: CPT

## 2024-08-22 ENCOUNTER — TELEPHONE (OUTPATIENT)
Dept: CARDIOLOGY CLINIC | Age: 64
End: 2024-08-22

## 2024-08-22 DIAGNOSIS — I25.10 CORONARY ARTERY DISEASE INVOLVING NATIVE CORONARY ARTERY OF NATIVE HEART WITHOUT ANGINA PECTORIS: ICD-10-CM

## 2024-08-22 DIAGNOSIS — R76.0 ANTIPHOSPHOLIPID ANTIBODY POSITIVE: ICD-10-CM

## 2024-08-22 DIAGNOSIS — Z15.89 HISTORY OF MTHFR MUTATION: Primary | ICD-10-CM

## 2024-08-22 DIAGNOSIS — I77.810 ASCENDING AORTA DILATATION (HCC): ICD-10-CM

## 2024-08-30 ENCOUNTER — HOSPITAL ENCOUNTER (OUTPATIENT)
Dept: GENERAL RADIOLOGY | Age: 64
Discharge: HOME | End: 2024-08-30
Payer: COMMERCIAL

## 2024-08-30 DIAGNOSIS — N20.0 CALCULUS OF KIDNEY: ICD-10-CM

## 2024-08-30 PROCEDURE — 74018 RADEX ABDOMEN 1 VIEW: CPT

## 2024-09-01 DIAGNOSIS — I25.10 CORONARY ARTERY DISEASE INVOLVING NATIVE CORONARY ARTERY OF NATIVE HEART WITHOUT ANGINA PECTORIS: ICD-10-CM

## 2024-09-01 DIAGNOSIS — N40.0 BENIGN PROSTATIC HYPERPLASIA, UNSPECIFIED WHETHER LOWER URINARY TRACT SYMPTOMS PRESENT: ICD-10-CM

## 2024-09-01 RX ORDER — FINASTERIDE 5 MG/1
TABLET, FILM COATED ORAL
Qty: 90 TABLET | Refills: 1 | Status: SHIPPED | OUTPATIENT
Start: 2024-09-01

## 2024-09-01 RX ORDER — ATORVASTATIN CALCIUM 10 MG/1
TABLET, FILM COATED ORAL
Qty: 90 TABLET | Refills: 1 | Status: SHIPPED | OUTPATIENT
Start: 2024-09-01

## 2024-09-01 RX ORDER — AMLODIPINE BESYLATE 5 MG/1
TABLET ORAL
Qty: 90 TABLET | Refills: 1 | Status: SHIPPED | OUTPATIENT
Start: 2024-09-01

## 2024-09-12 ENCOUNTER — OFFICE VISIT (OUTPATIENT)
Dept: FAMILY MEDICINE CLINIC | Age: 64
End: 2024-09-12
Payer: COMMERCIAL

## 2024-09-12 VITALS
SYSTOLIC BLOOD PRESSURE: 134 MMHG | BODY MASS INDEX: 36.92 KG/M2 | WEIGHT: 243.6 LBS | HEIGHT: 68 IN | HEART RATE: 65 BPM | DIASTOLIC BLOOD PRESSURE: 74 MMHG | OXYGEN SATURATION: 98 %

## 2024-09-12 DIAGNOSIS — R31.9 HEMATURIA, UNSPECIFIED TYPE: ICD-10-CM

## 2024-09-12 DIAGNOSIS — N20.0 KIDNEY STONE: ICD-10-CM

## 2024-09-12 DIAGNOSIS — Z01.818 PRE-OP EXAMINATION: Primary | ICD-10-CM

## 2024-09-12 PROCEDURE — 99214 OFFICE O/P EST MOD 30 MIN: CPT | Performed by: NURSE PRACTITIONER

## 2024-09-12 PROCEDURE — 93000 ELECTROCARDIOGRAM COMPLETE: CPT | Performed by: NURSE PRACTITIONER

## 2024-09-12 SDOH — ECONOMIC STABILITY: FOOD INSECURITY: WITHIN THE PAST 12 MONTHS, THE FOOD YOU BOUGHT JUST DIDN'T LAST AND YOU DIDN'T HAVE MONEY TO GET MORE.: NEVER TRUE

## 2024-09-12 SDOH — ECONOMIC STABILITY: FOOD INSECURITY: WITHIN THE PAST 12 MONTHS, YOU WORRIED THAT YOUR FOOD WOULD RUN OUT BEFORE YOU GOT MONEY TO BUY MORE.: NEVER TRUE

## 2024-09-12 SDOH — ECONOMIC STABILITY: INCOME INSECURITY: HOW HARD IS IT FOR YOU TO PAY FOR THE VERY BASICS LIKE FOOD, HOUSING, MEDICAL CARE, AND HEATING?: NOT HARD AT ALL

## 2024-09-25 ENCOUNTER — HOSPITAL ENCOUNTER (OUTPATIENT)
Dept: GENERAL RADIOLOGY | Age: 64
Discharge: HOME | End: 2024-09-25
Payer: COMMERCIAL

## 2024-09-25 DIAGNOSIS — N20.0 CALCULUS OF KIDNEY: ICD-10-CM

## 2024-09-25 PROCEDURE — 74018 RADEX ABDOMEN 1 VIEW: CPT

## 2024-09-27 ENCOUNTER — HOSPITAL ENCOUNTER (OUTPATIENT)
Dept: GENERAL RADIOLOGY | Age: 64
Discharge: HOME | End: 2024-09-27
Payer: COMMERCIAL

## 2024-09-27 DIAGNOSIS — N20.0 CALCULUS OF KIDNEY: ICD-10-CM

## 2024-09-27 PROCEDURE — 74018 RADEX ABDOMEN 1 VIEW: CPT

## 2024-10-22 ENCOUNTER — HOSPITAL ENCOUNTER (OUTPATIENT)
Dept: CT IMAGING | Age: 64
Discharge: HOME | End: 2024-10-22
Attending: INTERNAL MEDICINE
Payer: COMMERCIAL

## 2024-10-22 DIAGNOSIS — Z15.89 HISTORY OF MTHFR MUTATION: ICD-10-CM

## 2024-10-22 DIAGNOSIS — K76.9 LIVER LESION: Primary | ICD-10-CM

## 2024-10-22 DIAGNOSIS — I77.810 ASCENDING AORTA DILATATION (HCC): ICD-10-CM

## 2024-10-22 DIAGNOSIS — I25.10 CORONARY ARTERY DISEASE INVOLVING NATIVE CORONARY ARTERY OF NATIVE HEART WITHOUT ANGINA PECTORIS: ICD-10-CM

## 2024-10-22 DIAGNOSIS — R76.0 ANTIPHOSPHOLIPID ANTIBODY POSITIVE: ICD-10-CM

## 2024-10-22 LAB
PERFORMED ON: NORMAL
POC CREATININE: 0.8 MG/DL (ref 0.8–1.3)
POC SAMPLE TYPE: NORMAL

## 2024-10-22 PROCEDURE — 74174 CTA ABD&PLVS W/CONTRAST: CPT

## 2024-10-22 PROCEDURE — 6360000004 HC RX CONTRAST MEDICATION: Performed by: INTERNAL MEDICINE

## 2024-10-22 PROCEDURE — 82565 ASSAY OF CREATININE: CPT

## 2024-10-22 RX ORDER — IOPAMIDOL 755 MG/ML
75 INJECTION, SOLUTION INTRAVASCULAR
Status: COMPLETED | OUTPATIENT
Start: 2024-10-22 | End: 2024-10-22

## 2024-10-22 RX ADMIN — IOPAMIDOL 75 ML: 755 INJECTION, SOLUTION INTRAVENOUS at 09:58

## 2024-10-22 NOTE — RESULT ENCOUNTER NOTE
Spoke with patient. Relayed results. Patient V/U. GI consult placed. Patient is following with Urology and receiving treatment for kidney stones. He VU to have consultation with GI in relation to liver lesion that was incidentally found.

## 2024-11-04 ENCOUNTER — HOSPITAL ENCOUNTER (OUTPATIENT)
Dept: GENERAL RADIOLOGY | Age: 64
Discharge: HOME | End: 2024-11-04
Payer: COMMERCIAL

## 2024-11-04 DIAGNOSIS — N20.0 CALCULUS OF KIDNEY: ICD-10-CM

## 2024-11-04 PROCEDURE — 74018 RADEX ABDOMEN 1 VIEW: CPT

## 2024-11-07 DIAGNOSIS — R76.0 ANTIPHOSPHOLIPID ANTIBODY POSITIVE: ICD-10-CM

## 2024-11-08 NOTE — TELEPHONE ENCOUNTER
Last Office Visit: 10/27/2023 Provider: MORE  Is provider OOT? No    Next Office Visit: 11/14/2024 Provider: MORE  **If no OV, when does pt need to be seen?   **Has patient already had 30 day supply?     LAST LABS:   CBC:   Lab Results   Component Value Date    WBC 4.3 04/05/2022    HGB 14.7 04/05/2022    HCT 42.4 04/05/2022    MCV 86.8 04/05/2022     (L) 04/05/2022     BMP:   Lab Results   Component Value Date/Time     04/05/2022 08:32 AM    K 3.6 04/05/2022 08:32 AM    K 3.8 03/07/2022 02:55 AM     04/05/2022 08:32 AM    CO2 21 04/05/2022 08:32 AM    BUN 11 04/05/2022 08:32 AM    CREATININE 0.8 10/22/2024 09:56 AM    CREATININE 0.7 04/05/2022 08:32 AM    GLUCOSE 104 04/05/2022 08:32 AM    GLUCOSE 103 04/02/2015 09:19 AM    CALCIUM 9.3 04/05/2022 08:32 AM    LABGLOM >90 10/22/2024 09:56 AM    LABGLOM >60 10/21/2022 01:13 PM       CMP:   Lab Results   Component Value Date     04/05/2022    K 3.6 04/05/2022     04/05/2022    CO2 21 04/05/2022    BUN 11 04/05/2022    CREATININE 0.8 10/22/2024    GLUCOSE 104 (H) 04/05/2022    CALCIUM 9.3 04/05/2022    BILITOT 0.3 04/05/2022    ALKPHOS 65 04/05/2022    AST 19 04/05/2022    ALT 31 04/05/2022    LABGLOM >90 10/22/2024    GFRAA >60 04/05/2022    AGRATIO 2.4 (H) 04/05/2022    GLOB 2.5 09/10/2017     **Check Care Everywhere? N/A  **Lab orders needed? N/A    Is encounter provider correct?   Yes  Does refill dosage match last filled?   Yes  Changes to script from Tele Encounters or LOV Plan?   no  Adjust amount or refills to reflect last and upcoming OV?  no    Requested Prescriptions     Pending Prescriptions Disp Refills    rivaroxaban (XARELTO) 20 MG TABS tablet [Pharmacy Med Name: XARELTO 20MG TABLETS] 90 tablet 3     Sig: TAKE 1 TABLET BY MOUTH EVERY DAY

## 2024-11-13 NOTE — PROGRESS NOTES
02:55 AM     04/05/2022 08:32 AM    CO2 21 04/05/2022 08:32 AM    BUN 11 04/05/2022 08:32 AM    CREATININE 0.8 10/22/2024 09:56 AM    CREATININE 0.7 04/05/2022 08:32 AM    GFRAA >60 04/05/2022 08:32 AM    GFRAA >60 08/04/2011 08:41 AM    AGRATIO 2.4 04/05/2022 08:32 AM    LABGLOM >90 10/22/2024 09:56 AM    LABGLOM >60 10/21/2022 01:13 PM    GLUCOSE 104 04/05/2022 08:32 AM    GLUCOSE 103 04/02/2015 09:19 AM    CALCIUM 9.3 04/05/2022 08:32 AM    BILITOT 0.3 04/05/2022 08:32 AM    ALKPHOS 65 04/05/2022 08:32 AM    AST 19 04/05/2022 08:32 AM    ALT 31 04/05/2022 08:32 AM     PT/INR:    No results found for: \"PTINR\"  Lab Results   Component Value Date    TROPONINI <0.01 05/10/2019     No components found for: \"CHLPL\"  Lab Results   Component Value Date    TRIG 106 09/16/2019    TRIG 124 10/13/2016    TRIG 114 09/30/2015     Lab Results   Component Value Date    HDL 28 (L) 04/05/2022    HDL 32 (L) 09/16/2019    HDL 29 (L) 10/13/2016     No components found for: \"LDLCALC\"    No components found for: \"LABVLDL\"    Lab Results   Component Value Date    ALT 31 04/05/2022    AST 19 04/05/2022    ALKPHOS 65 04/05/2022    BILITOT 0.3 04/05/2022         Assessment:  64 y.o. patient with:   Diagnosis Orders   1. Coronary artery disease involving native coronary artery of native heart without angina pectoris        2. Antiphospholipid antibody positive  rivaroxaban (XARELTO) 20 MG TABS tablet      3. Ascending aorta dilatation (HCC)        4. Medication management  LIPID PANEL    Hepatic Function Panel      5. Chest pain, unspecified type  nitroGLYCERIN (NITROSTAT) 0.4 MG SL tablet              Plan:  I recommend that the patient continue their currently prescribed medications. Their drug modifiable risk factors appear to be well controlled. I will continue to address the need/dosing of medications in future visits.   Order fasting lipids and liver function studies   Continue to follow with Hematology   Reviewed CTA

## 2024-11-14 ENCOUNTER — OFFICE VISIT (OUTPATIENT)
Dept: CARDIOLOGY CLINIC | Age: 64
End: 2024-11-14
Payer: COMMERCIAL

## 2024-11-14 VITALS
OXYGEN SATURATION: 94 % | SYSTOLIC BLOOD PRESSURE: 112 MMHG | BODY MASS INDEX: 36.9 KG/M2 | HEIGHT: 68 IN | HEART RATE: 60 BPM | DIASTOLIC BLOOD PRESSURE: 66 MMHG | WEIGHT: 243.5 LBS

## 2024-11-14 DIAGNOSIS — I25.10 CORONARY ARTERY DISEASE INVOLVING NATIVE CORONARY ARTERY OF NATIVE HEART WITHOUT ANGINA PECTORIS: Primary | ICD-10-CM

## 2024-11-14 DIAGNOSIS — I77.810 ASCENDING AORTA DILATATION (HCC): ICD-10-CM

## 2024-11-14 DIAGNOSIS — R76.0 ANTIPHOSPHOLIPID ANTIBODY POSITIVE: ICD-10-CM

## 2024-11-14 DIAGNOSIS — Z79.899 MEDICATION MANAGEMENT: ICD-10-CM

## 2024-11-14 DIAGNOSIS — R07.9 CHEST PAIN, UNSPECIFIED TYPE: ICD-10-CM

## 2024-11-14 PROCEDURE — 99214 OFFICE O/P EST MOD 30 MIN: CPT | Performed by: INTERNAL MEDICINE

## 2024-11-14 RX ORDER — NITROGLYCERIN 0.4 MG/1
0.4 TABLET SUBLINGUAL EVERY 5 MIN PRN
Qty: 25 TABLET | Refills: 3 | Status: SHIPPED | OUTPATIENT
Start: 2024-11-14

## 2024-11-14 NOTE — PATIENT INSTRUCTIONS
Plan:  I recommend that the patient continue their currently prescribed medications. Their drug modifiable risk factors appear to be well controlled. I will continue to address the need/dosing of medications in future visits.   Order fasting lipids and liver function studies   Continue to follow with Hematology   Reviewed CTA Chest from 10/2024  Follow up with me in 1 year

## 2024-11-20 DIAGNOSIS — Z79.899 MEDICATION MANAGEMENT: ICD-10-CM

## 2024-11-20 LAB
ALBUMIN SERPL-MCNC: 4.4 G/DL (ref 3.4–5)
ALP SERPL-CCNC: 84 U/L (ref 40–129)
ALT SERPL-CCNC: 32 U/L (ref 10–40)
AST SERPL-CCNC: 23 U/L (ref 15–37)
BILIRUB DIRECT SERPL-MCNC: 0.2 MG/DL (ref 0–0.3)
BILIRUB INDIRECT SERPL-MCNC: 0.1 MG/DL (ref 0–1)
BILIRUB SERPL-MCNC: 0.3 MG/DL (ref 0–1)
CHOLEST SERPL-MCNC: 126 MG/DL (ref 0–199)
HDLC SERPL-MCNC: 33 MG/DL (ref 40–60)
LDLC SERPL CALC-MCNC: 64 MG/DL
PROT SERPL-MCNC: 6.2 G/DL (ref 6.4–8.2)
TRIGL SERPL-MCNC: 145 MG/DL (ref 0–150)
VLDLC SERPL CALC-MCNC: 29 MG/DL

## 2024-12-02 ENCOUNTER — HOSPITAL ENCOUNTER (OUTPATIENT)
Dept: CT IMAGING | Age: 64
Discharge: HOME | End: 2024-12-02
Payer: COMMERCIAL

## 2024-12-02 DIAGNOSIS — N20.1 CALCULUS OF URETER: ICD-10-CM

## 2024-12-02 DIAGNOSIS — R31.0 GROSS HEMATURIA: ICD-10-CM

## 2024-12-02 PROCEDURE — 74176 CT ABD & PELVIS W/O CONTRAST: CPT

## 2024-12-07 DIAGNOSIS — R76.0 ANTIPHOSPHOLIPID ANTIBODY POSITIVE: ICD-10-CM

## 2024-12-09 NOTE — TELEPHONE ENCOUNTER
Last Office Visit: 11/14/2024 Provider: MORE  Is provider OOT? No    Next Office Visit: 11/10/25 Provider: MORE    **Has patient already had 30 day supply? No    Requested Prescriptions     Pending Prescriptions Disp Refills    rivaroxaban (XARELTO) 20 MG TABS tablet [Pharmacy Med Name: XARELTO 20MG TABLETS] 30 tablet      Sig: TAKE 1 TABLET BY MOUTH EVERY DAY      Duplicate request. Request denied- pt just got 1  yr supply ordered 11/14/24     rivaroxaban (XARELTO) 20 MG TABS tablet [[id number]]    Order Details  Dose, Route, Frequency: As Directed   Dispense Quantity: 90 tablet Refills: 3          Sig: TAKE 1 TABLET BY MOUTH EVERY DAY         Start Date: 11/14/24 End Date: --   Written Date: 11/14/24 Expiration Date: 11/14/25

## 2024-12-17 ENCOUNTER — TRANSCRIBE ORDERS (OUTPATIENT)
Dept: ADMINISTRATIVE | Age: 64
End: 2024-12-17

## 2024-12-17 DIAGNOSIS — N20.1 CALCULUS OF URETER: Primary | ICD-10-CM

## 2024-12-17 DIAGNOSIS — N20.0 CALCULUS OF KIDNEY: ICD-10-CM

## 2025-01-09 ENCOUNTER — HOSPITAL ENCOUNTER (OUTPATIENT)
Dept: GENERAL RADIOLOGY | Age: 65
Discharge: HOME | End: 2025-01-09
Payer: COMMERCIAL

## 2025-01-09 DIAGNOSIS — N20.1 CALCULUS OF URETER: ICD-10-CM

## 2025-01-09 DIAGNOSIS — N20.0 KIDNEY STONE: ICD-10-CM

## 2025-01-09 PROCEDURE — 74018 RADEX ABDOMEN 1 VIEW: CPT

## 2025-01-16 ENCOUNTER — TRANSCRIBE ORDERS (OUTPATIENT)
Dept: ADMINISTRATIVE | Age: 65
End: 2025-01-16

## 2025-01-16 DIAGNOSIS — R97.20 ELEVATED PROSTATE SPECIFIC ANTIGEN (PSA): ICD-10-CM

## 2025-01-16 DIAGNOSIS — R31.0 GROSS HEMATURIA: ICD-10-CM

## 2025-01-16 DIAGNOSIS — N20.0 CALCULUS OF KIDNEY: Primary | ICD-10-CM

## 2025-02-21 DIAGNOSIS — I25.10 CORONARY ARTERY DISEASE INVOLVING NATIVE CORONARY ARTERY OF NATIVE HEART WITHOUT ANGINA PECTORIS: ICD-10-CM

## 2025-02-21 RX ORDER — AMLODIPINE BESYLATE 5 MG/1
TABLET ORAL
Qty: 90 TABLET | Refills: 1 | Status: SHIPPED | OUTPATIENT
Start: 2025-02-21

## 2025-02-21 RX ORDER — ATORVASTATIN CALCIUM 10 MG/1
TABLET, FILM COATED ORAL
Qty: 90 TABLET | Refills: 1 | Status: SHIPPED | OUTPATIENT
Start: 2025-02-21

## 2025-02-21 NOTE — TELEPHONE ENCOUNTER
Last Office Visit  -  9/12/24  Next Office Visit  -  n/a    Last Filled  -  9/1/24  Last UDS -    Contract -

## 2025-03-11 ENCOUNTER — HOSPITAL ENCOUNTER (OUTPATIENT)
Dept: ULTRASOUND IMAGING | Age: 65
Discharge: HOME | End: 2025-03-11
Attending: UROLOGY
Payer: COMMERCIAL

## 2025-03-11 DIAGNOSIS — N20.0 CALCULUS OF KIDNEY: ICD-10-CM

## 2025-03-11 DIAGNOSIS — R31.0 GROSS HEMATURIA: ICD-10-CM

## 2025-03-11 DIAGNOSIS — R97.20 ELEVATED PROSTATE SPECIFIC ANTIGEN (PSA): ICD-10-CM

## 2025-03-11 PROCEDURE — 76770 US EXAM ABDO BACK WALL COMP: CPT

## 2025-04-02 DIAGNOSIS — I25.10 CORONARY ARTERY DISEASE INVOLVING NATIVE CORONARY ARTERY OF NATIVE HEART WITHOUT ANGINA PECTORIS: ICD-10-CM

## 2025-04-02 RX ORDER — AMLODIPINE BESYLATE 5 MG/1
TABLET ORAL
Qty: 90 TABLET | Refills: 1 | Status: SHIPPED | OUTPATIENT
Start: 2025-04-02

## 2025-04-02 RX ORDER — ATORVASTATIN CALCIUM 10 MG/1
TABLET, FILM COATED ORAL
Qty: 90 TABLET | Refills: 1 | Status: SHIPPED | OUTPATIENT
Start: 2025-04-02

## 2025-04-15 DIAGNOSIS — R76.0 ANTIPHOSPHOLIPID ANTIBODY POSITIVE: ICD-10-CM

## 2025-04-16 RX ORDER — RIVAROXABAN 20 MG/1
20 TABLET, FILM COATED ORAL DAILY
Qty: 90 TABLET | Refills: 2 | Status: SHIPPED | OUTPATIENT
Start: 2025-04-16

## 2025-04-16 NOTE — TELEPHONE ENCOUNTER
Last Office Visit: 11/14/2024 Provider: MORE  **Is provider OOT? No    Next Office Visit: 11/10/25 Provider: MORE    LAST LABS:   CMP:   Lab Results   Component Value Date     04/05/2022    K 3.6 04/05/2022     04/05/2022    CO2 21 04/05/2022    BUN 11 04/05/2022    CREATININE 0.8 10/22/2024    GLUCOSE 104 (H) 04/05/2022    CALCIUM 9.3 04/05/2022    BILITOT 0.3 11/20/2024    ALKPHOS 84 11/20/2024    AST 23 11/20/2024    ALT 32 11/20/2024    LABGLOM >90 10/22/2024    GFRAA >60 04/05/2022    AGRATIO 2.4 (H) 04/05/2022    GLOB 2.5 09/10/2017          Lab orders needed? no

## 2025-04-17 NOTE — TELEPHONE ENCOUNTER
I have reviewed lab work.  Liver enzymes and alkaline phosphatase have nearly normalized from 2 years ago.  We will trend this lab out 1 more time in 3 months.  Renal function is normal.  Total cholesterol, LDL cholesterol and HDL cholesterol are all within normal limits.  Although he is a smoker, he does not currently require statin based on his ASCVD risk score.  Hemoglobin A1c is normal.  CBC is normal.    Thank you,    Shady Anderson   Patient called pre-service center [facility]) [address] with red flag complaint. Brief description of triage: Jude Bustamante states that he has blood in the urine and flank pain for the past 2 weeks. He has had kidney stones before and was trying to pass on his own. Please see triage below for more detailed information. Call to the office to speak with provider on whether he would be able to make appt or if he would need to go to ED today    Triage indicates for patient to Go to ED or office with [facility]    Call placed to PCP office and attempted to reach PCP, unsuccessful. Call placed to Karlene Santoyo NP    2nd level triage performed with Nesha Blackwell NP. After discussing pt's reason for call and assessment, Karlene Santoyo NP states pt should be seen in the office today and if there are no appts available he would need to go to the ED. Patient aware of the recommendation and agrees. Care advice provided, patient verbalizes understanding; denies any other questions or concerns; instructed to call back for any new or worsening symptoms. Writer provided warm transfer to Keely Jennings at [facility] [place] for appointment scheduling. Attention Provider: Thank you for allowing me to participate in the care of your patient. The patient was connected to triage in response to information provided to the ECC. Please do not respond through this encounter as the response is not directed to a shared pool. Reason for Disposition   Blood in urine (red, pink, or tea-colored)    Answer Assessment - Initial Assessment Questions  1. LOCATION: \"Where does it hurt? \" (e.g., left, right)      Right flank    2. ONSET: \"When did the pain start? \"      2 weeks ago    3. SEVERITY: \"How bad is the pain? \" (e.g., Scale 1-10; mild, moderate, or severe)    - MILD (1-3): doesn't interfere with normal activities     - MODERATE (4-7): interferes with normal activities or awakens from sleep     - SEVERE (8-10): excruciating pain and patient unable to do normal

## 2025-04-18 ENCOUNTER — TRANSCRIBE ORDERS (OUTPATIENT)
Dept: ADMINISTRATIVE | Age: 65
End: 2025-04-18

## 2025-04-18 DIAGNOSIS — R31.0 GROSS HEMATURIA: Primary | ICD-10-CM

## 2025-04-25 ENCOUNTER — HOSPITAL ENCOUNTER (OUTPATIENT)
Dept: CT IMAGING | Age: 65
Discharge: HOME | End: 2025-04-25
Payer: COMMERCIAL

## 2025-04-25 DIAGNOSIS — R31.0 GROSS HEMATURIA: ICD-10-CM

## 2025-04-25 PROCEDURE — 74176 CT ABD & PELVIS W/O CONTRAST: CPT

## 2025-05-27 ENCOUNTER — TELEMEDICINE (OUTPATIENT)
Dept: SLEEP MEDICINE | Age: 65
End: 2025-05-27
Payer: COMMERCIAL

## 2025-05-27 ENCOUNTER — TELEPHONE (OUTPATIENT)
Dept: PULMONOLOGY | Age: 65
End: 2025-05-27

## 2025-05-27 DIAGNOSIS — I10 PRIMARY HYPERTENSION: ICD-10-CM

## 2025-05-27 DIAGNOSIS — E66.9 OBESITY (BMI 30-39.9): ICD-10-CM

## 2025-05-27 DIAGNOSIS — Z71.89 CPAP USE COUNSELING: ICD-10-CM

## 2025-05-27 DIAGNOSIS — G47.33 MODERATE OBSTRUCTIVE SLEEP APNEA: Primary | ICD-10-CM

## 2025-05-27 PROCEDURE — 3017F COLORECTAL CA SCREEN DOC REV: CPT | Performed by: NURSE PRACTITIONER

## 2025-05-27 PROCEDURE — G8427 DOCREV CUR MEDS BY ELIG CLIN: HCPCS | Performed by: NURSE PRACTITIONER

## 2025-05-27 PROCEDURE — 99214 OFFICE O/P EST MOD 30 MIN: CPT | Performed by: NURSE PRACTITIONER

## 2025-05-27 ASSESSMENT — SLEEP AND FATIGUE QUESTIONNAIRES
HOW LIKELY ARE YOU TO NOD OFF OR FALL ASLEEP WHILE SITTING INACTIVE IN A PUBLIC PLACE: WOULD NEVER DOZE
HOW LIKELY ARE YOU TO NOD OFF OR FALL ASLEEP IN A CAR, WHILE STOPPED FOR A FEW MINUTES IN TRAFFIC: WOULD NEVER DOZE
HOW LIKELY ARE YOU TO NOD OFF OR FALL ASLEEP WHILE SITTING AND TALKING TO SOMEONE: WOULD NEVER DOZE
HOW LIKELY ARE YOU TO NOD OFF OR FALL ASLEEP WHILE WATCHING TV: SLIGHT CHANCE OF DOZING
ESS TOTAL SCORE: 4
HOW LIKELY ARE YOU TO NOD OFF OR FALL ASLEEP WHILE LYING DOWN TO REST IN THE AFTERNOON WHEN CIRCUMSTANCES PERMIT: SLIGHT CHANCE OF DOZING
HOW LIKELY ARE YOU TO NOD OFF OR FALL ASLEEP WHILE SITTING QUIETLY AFTER LUNCH WITHOUT ALCOHOL: SLIGHT CHANCE OF DOZING
HOW LIKELY ARE YOU TO NOD OFF OR FALL ASLEEP WHEN YOU ARE A PASSENGER IN A CAR FOR AN HOUR WITHOUT A BREAK: WOULD NEVER DOZE
HOW LIKELY ARE YOU TO NOD OFF OR FALL ASLEEP WHILE SITTING AND READING: SLIGHT CHANCE OF DOZING

## 2025-05-27 NOTE — PROGRESS NOTES
CHIEF COMPLAINT: Sleep apnea    Sameer Hernandes is a 65 y.o. male for televideo appointment via video and audio virtual visit for DEE follow up.  States he is doing well with CPAP.    Patient is using CPAP   6-7 hrs/night. Using humidifier. No snoring on CPAP. The pressure is well tolerated. The mask is comfortable- full face. No mask leak. No significant daytime sleepiness. No nodding off when driving. No dry nose or throat. Minimal fatigue. Bedtime is 10 pm-1 am and rise time is 8-9 am. Sleep onset is few minutes. Wakes up 0-1 times at night total. No nocturia. It takes few minutes to fall back a sleep. Rare naps during the day. No headache in am. No weight gain. 4-5 caffienated beverages during the day. No alcohol. ESS is 4      Past Medical History:   Diagnosis Date    Antiphospholipid syndrome     Blood clotting disorder     Coronary artery disease 4/23/2014    Dr. Nguyễn hematology and Dr. Lott [facility]iology    Hyperlipidemia     DEE on CPAP     Osteoarthritis of right hip     Primary osteoarthritis of left hip 10/9/2017     Past Surgical History:   Procedure Laterality Date    CARDIAC CATHETERIZATION  2014    CARDIAC SURGERY  2014    COLONOSCOPY      COLONOSCOPY  05/02/2016    colon polyp, biopsy ascending lesion    COLONOSCOPY N/A 03/08/2022    COLONOSCOPY DIAGNOSTIC performed by Adrian Cortez MD at [facility] ENDOSCOPY    DENTAL SURGERY  2015    FRACTURE SURGERY Right 2004    tib-fib    JOINT REPLACEMENT Left 2018    left hip    JOINT REPLACEMENT Right 08/2020    JOINT REPLACEMENT  08/2020    SKIN BIOPSY       Allergies   Allergen Reactions    Lorazepam      Used as a pre-op with L hip Repl, had an anxiety rx        Current Medications:    Current Outpatient Medications:     XARELTO 20 MG TABS tablet, TAKE 1 TABLET BY MOUTH EVERY DAY, Disp: 90 tablet, Rfl: 2    amLODIPine (NORVASC) 5 MG tablet, TAKE 1 TABLET DAILY, Disp: 90 tablet, Rfl: 1    atorvastatin (LIPITOR) 10 MG tablet, TAKE 1 TABLET NIGHTLY AT   BEDTIME,

## 2025-05-28 ENCOUNTER — TELEPHONE (OUTPATIENT)
Dept: CARDIOLOGY CLINIC | Age: 65
End: 2025-05-28

## 2025-05-28 DIAGNOSIS — R76.0 ANTIPHOSPHOLIPID ANTIBODY POSITIVE: ICD-10-CM

## 2025-05-28 RX ORDER — RIVAROXABAN 20 MG/1
20 TABLET, FILM COATED ORAL DAILY
Qty: 90 TABLET | Refills: 0 | Status: SHIPPED | OUTPATIENT
Start: 2025-05-28

## 2025-05-28 NOTE — TELEPHONE ENCOUNTER
Pt called stating that the pharmacy is telling him he is out of refills for XARELTO 20 MG TABS tablet   Please send to   [id number] - [place], [place] - [facility] [address] [phone] - F [phone] [address] [facility] [place] [address]  Phone: [phone]  Fax: [phone]

## 2025-05-28 NOTE — TELEPHONE ENCOUNTER
Last Office Visit: 11/14/2024 Provider: MORE  **Is provider OOT? No    Next Office Visit: 11/10/25 Provider: MORE

## 2025-08-06 RX ORDER — AMLODIPINE BESYLATE 5 MG/1
5 TABLET ORAL DAILY
Qty: 30 TABLET | Refills: 0 | Status: SHIPPED | OUTPATIENT
Start: 2025-08-06

## (undated) DEVICE — ENDOSCOPIC KIT 2 12 FT OP4 DE2 GWN SYR

## (undated) DEVICE — CANNULA NSL AD TBNG L7FT PVC STR NONFLARED PRNG O2 DEL W STD

## (undated) DEVICE — ELECTRODE,ECG,STRESS,FOAM,3PK: Brand: MEDLINE